# Patient Record
Sex: FEMALE | Race: BLACK OR AFRICAN AMERICAN | Employment: FULL TIME | URBAN - METROPOLITAN AREA
[De-identification: names, ages, dates, MRNs, and addresses within clinical notes are randomized per-mention and may not be internally consistent; named-entity substitution may affect disease eponyms.]

---

## 2017-01-03 ENCOUNTER — TELEPHONE (OUTPATIENT)
Dept: FAMILY MEDICINE CLINIC | Facility: CLINIC | Age: 27
End: 2017-01-03

## 2017-01-03 NOTE — TELEPHONE ENCOUNTER
Received records from immediate care visit when patient presented for elevated BP and syncope last February. Reviewed normal EKG, normal urine dip, normal CBC, and normal BMP results.

## 2017-01-04 ENCOUNTER — MED REC SCAN ONLY (OUTPATIENT)
Dept: FAMILY MEDICINE CLINIC | Facility: CLINIC | Age: 27
End: 2017-01-04

## 2017-01-25 ENCOUNTER — OFFICE VISIT (OUTPATIENT)
Dept: FAMILY MEDICINE CLINIC | Facility: CLINIC | Age: 27
End: 2017-01-25

## 2017-01-25 VITALS
TEMPERATURE: 98 F | HEART RATE: 73 BPM | DIASTOLIC BLOOD PRESSURE: 96 MMHG | SYSTOLIC BLOOD PRESSURE: 130 MMHG | HEIGHT: 67 IN | RESPIRATION RATE: 16 BRPM | BODY MASS INDEX: 22.76 KG/M2 | OXYGEN SATURATION: 97 % | WEIGHT: 145 LBS

## 2017-01-25 DIAGNOSIS — R03.0 ELEVATED BP WITHOUT DIAGNOSIS OF HYPERTENSION: Primary | ICD-10-CM

## 2017-01-25 DIAGNOSIS — Z30.09 ENCOUNTER FOR COUNSELING REGARDING CONTRACEPTION: ICD-10-CM

## 2017-01-25 PROCEDURE — 99213 OFFICE O/P EST LOW 20 MIN: CPT | Performed by: FAMILY MEDICINE

## 2017-01-25 NOTE — PATIENT INSTRUCTIONS
Birth Control Methods  Birth control methods are used to help prevent pregnancy. There are many different methods to choose from.  Talk to your healthcare provider about which method is right for you. Be sure to ask your provider about the effectiveness o A condom is a sheath that forms a thin barrier between the penis and the vagina. It helps prevent pregnancy by keeping sperm from entering the vagina.  When latex condoms are used, they have the added benefit of protecting against most STDs (sexually transm This method requires that you know when in your menstrual cycle you are likely to become pregnant. Then, you avoid sex during those days. This requires careful planning and good discipline. Your healthcare provider can explain more about how this works.   Rashid Viveros

## 2017-01-25 NOTE — PROGRESS NOTES
CC:  Patient presents with: Follow - Up: blood pressure      HPI: 32year old female here to follow-up on elevated blood pressure. Has been keeping a BP log for the past few weeks.   107/69, 130/90, and 149/95 (standing), which was after she had been rush murmurs, S1, S2  Pulmonary:  Clear bilaterally, good air entry  Dermatologic:  No rashes or lesions    Assessment and Plan: 32year old female with hx of elevated BP in the office but normal readings at home here for follow-up.     1. Elevated BP without di

## 2017-03-31 ENCOUNTER — TELEPHONE (OUTPATIENT)
Dept: OBGYN CLINIC | Facility: CLINIC | Age: 27
End: 2017-03-31

## 2017-03-31 NOTE — TELEPHONE ENCOUNTER
Discussed readings with patients and notified her that these were all below goal. Continue hibiscus tea and will have her come into the clinic for a physical in next 1-2 months, but notify me if blood pressure starts to run high again or she has any new co

## 2017-11-01 ENCOUNTER — OFFICE VISIT (OUTPATIENT)
Dept: FAMILY MEDICINE CLINIC | Facility: CLINIC | Age: 27
End: 2017-11-01

## 2017-11-01 DIAGNOSIS — Z11.1 SCREENING-PULMONARY TB: Primary | ICD-10-CM

## 2017-11-01 PROCEDURE — 86580 TB INTRADERMAL TEST: CPT | Performed by: NURSE PRACTITIONER

## 2017-11-02 NOTE — PATIENT INSTRUCTIONS
You will need to return to clinic in 48-72 hours to have results of TB test read. Please return to clinic on 11/4/2017  between 9AM and 4:30PM have your TB test read. If you do not return during this time your test will need to be repeated.      Our · Positive results mean that you may have been infected with the TB bacteria. This doesn’t necessarily mean you have active TB disease. You will need more tests, such as chest Janee Puffer find out if you have TB disease.    Date Last Reviewed: 11/1/2016  © 2

## 2017-11-04 ENCOUNTER — OFFICE VISIT (OUTPATIENT)
Dept: FAMILY MEDICINE CLINIC | Facility: CLINIC | Age: 27
End: 2017-11-04

## 2017-11-04 DIAGNOSIS — Z11.1 SCREENING EXAMINATION FOR PULMONARY TUBERCULOSIS: Primary | ICD-10-CM

## 2018-01-14 ENCOUNTER — APPOINTMENT (OUTPATIENT)
Dept: GENERAL RADIOLOGY | Age: 28
End: 2018-01-14
Attending: EMERGENCY MEDICINE
Payer: COMMERCIAL

## 2018-01-14 ENCOUNTER — HOSPITAL ENCOUNTER (OUTPATIENT)
Age: 28
Discharge: HOME | End: 2018-01-14
Attending: EMERGENCY MEDICINE
Payer: COMMERCIAL

## 2018-01-14 VITALS
TEMPERATURE: 98 F | SYSTOLIC BLOOD PRESSURE: 142 MMHG | DIASTOLIC BLOOD PRESSURE: 103 MMHG | WEIGHT: 145.06 LBS | BODY MASS INDEX: 23 KG/M2 | OXYGEN SATURATION: 98 % | RESPIRATION RATE: 16 BRPM | HEART RATE: 77 BPM

## 2018-01-14 DIAGNOSIS — S16.1XXA NECK STRAIN, INITIAL ENCOUNTER: ICD-10-CM

## 2018-01-14 DIAGNOSIS — S20.211A CONTUSION OF RIGHT CHEST WALL, INITIAL ENCOUNTER: Primary | ICD-10-CM

## 2018-01-14 PROCEDURE — 71101 X-RAY EXAM UNILAT RIBS/CHEST: CPT | Performed by: EMERGENCY MEDICINE

## 2018-01-14 PROCEDURE — 99203 OFFICE O/P NEW LOW 30 MIN: CPT

## 2018-01-14 PROCEDURE — 99214 OFFICE O/P EST MOD 30 MIN: CPT

## 2018-01-14 PROCEDURE — 72040 X-RAY EXAM NECK SPINE 2-3 VW: CPT | Performed by: EMERGENCY MEDICINE

## 2018-01-14 NOTE — ED PROVIDER NOTES
Patient Seen in: [facility] In [address] [place] [address]    History   Patient presents with:  Fall (musculoskeletal, neurologic)    Stated Complaint: back pain after fall    HPI    The patient is a 14-year-old female without significant past medical segment  Breath sounds equal bilaterally  Tender right anterior axillary line at rib 789 area  Abdomen: Nontender  Neuro: Sensation intact light touch in upper extremities  Strength 5/5 upper extremities    ED Course   Labs Reviewed - No data to display

## 2018-01-14 NOTE — ED INITIAL ASSESSMENT (HPI)
Robby Motto off bed on rt side of bed yesterday and now has pain from mid back to neck and had a numb arm is numb. Use advill. Took nothing for pain moves freely no distress.

## 2018-01-29 ENCOUNTER — HOSPITAL ENCOUNTER (OUTPATIENT)
Age: 28
Discharge: HOME | End: 2018-01-29
Attending: EMERGENCY MEDICINE
Payer: COMMERCIAL

## 2018-01-29 VITALS
DIASTOLIC BLOOD PRESSURE: 108 MMHG | HEIGHT: 67 IN | WEIGHT: 142 LBS | HEART RATE: 63 BPM | RESPIRATION RATE: 18 BRPM | BODY MASS INDEX: 22.29 KG/M2 | OXYGEN SATURATION: 100 % | SYSTOLIC BLOOD PRESSURE: 156 MMHG | TEMPERATURE: 98 F

## 2018-01-29 DIAGNOSIS — S01.319A LACERATION OF EAR CANAL, INITIAL ENCOUNTER: Primary | ICD-10-CM

## 2018-01-29 DIAGNOSIS — H72.2X1 TYMPANIC MEMBRANE PERFORATION, MARGINAL, RIGHT: ICD-10-CM

## 2018-01-29 PROCEDURE — 99213 OFFICE O/P EST LOW 20 MIN: CPT

## 2018-01-29 RX ORDER — OFLOXACIN 3 MG/ML
SOLUTION AURICULAR (OTIC) DAILY
Qty: 1 BOTTLE | Refills: 0 | Status: SHIPPED | OUTPATIENT
Start: 2018-01-29 | End: 2018-02-05

## 2018-01-29 NOTE — ED INITIAL ASSESSMENT (HPI)
Pt reports feeling her right ear is clogged, first noted last night. Denies pain. Dried blood noted to ear canal. Denies fever, cough.

## 2018-01-29 NOTE — ED PROVIDER NOTES
Patient Seen in: [address]    History   Patient presents with:  Ear Problem Pain (neurosensory): right    Stated Complaint: EAR INFECTION    HPI    The patient's  was helping the patient clean out her ears with a normal. Pupils are equal, round, and reactive to light. Ears: Left TM and canal normal.  Exam of right ear: Some dried blood noted external canal.  Posterior TM obscured by blood. Anteriorly TM appears intact.   Patient can hear rubbing fingers bilateral

## 2018-01-29 NOTE — ED NOTES
Pt given discharge instructions and prescription, verbalizes understanding. Work note provided for pt and family per request. Denies further questions or needs. Encouraged to call with questions.  Ambulatory out of immediate care with steady gait in no appa

## 2018-02-20 ENCOUNTER — TELEPHONE (OUTPATIENT)
Dept: OBGYN CLINIC | Facility: CLINIC | Age: 28
End: 2018-02-20

## 2018-02-20 ENCOUNTER — TELEPHONE (OUTPATIENT)
Dept: FAMILY MEDICINE CLINIC | Facility: CLINIC | Age: 28
End: 2018-02-20

## 2018-02-20 DIAGNOSIS — L81.9 DISCOLORATION OF SKIN: Primary | ICD-10-CM

## 2018-02-20 DIAGNOSIS — L85.3 DRY SKIN DERMATITIS: ICD-10-CM

## 2018-02-20 NOTE — TELEPHONE ENCOUNTER
Referral submitted as requested and patient will either come in to pick it up or get it at her visit in March.

## 2018-03-20 ENCOUNTER — TELEPHONE (OUTPATIENT)
Dept: FAMILY MEDICINE CLINIC | Facility: CLINIC | Age: 28
End: 2018-03-20

## 2018-03-20 ENCOUNTER — TELEPHONE (OUTPATIENT)
Dept: OBGYN CLINIC | Facility: CLINIC | Age: 28
End: 2018-03-20

## 2018-03-20 DIAGNOSIS — L81.9 DISCOLORATION OF SKIN: ICD-10-CM

## 2018-03-20 DIAGNOSIS — L85.3 DRY SKIN DERMATITIS: Primary | ICD-10-CM

## 2018-04-23 ENCOUNTER — MED REC SCAN ONLY (OUTPATIENT)
Dept: FAMILY MEDICINE CLINIC | Facility: CLINIC | Age: 28
End: 2018-04-23

## 2018-05-01 ENCOUNTER — TELEPHONE (OUTPATIENT)
Dept: FAMILY MEDICINE CLINIC | Facility: CLINIC | Age: 28
End: 2018-05-01

## 2018-05-01 RX ORDER — FLUOCINOLONE ACETONIDE 0.11 MG/ML
OIL TOPICAL
Refills: 3 | COMMUNITY
Start: 2018-03-01 | End: 2018-07-24

## 2018-05-01 RX ORDER — KETOCONAZOLE 20 MG/ML
SHAMPOO TOPICAL
Refills: 4 | COMMUNITY
Start: 2018-03-01 | End: 2018-07-24

## 2018-05-01 RX ORDER — KETOCONAZOLE 20 MG/G
CREAM TOPICAL
Refills: 1 | COMMUNITY
Start: 2018-03-01 | End: 2018-07-24

## 2018-05-01 NOTE — TELEPHONE ENCOUNTER
yesy mendes stating she needs a statement from Dr. Red Patel saying she is free of any communicable diseases.  Fax # [phone] attention HR

## 2018-05-01 NOTE — TELEPHONE ENCOUNTER
Per  she needs to come in for a physical and blood work in order for her to write the letter. Called patient no answer left a message to call back and schedule an appointment.

## 2018-05-05 ENCOUNTER — HOSPITAL ENCOUNTER (OUTPATIENT)
Age: 28
Discharge: HOME | End: 2018-05-05
Attending: EMERGENCY MEDICINE
Payer: COMMERCIAL

## 2018-05-05 VITALS
HEART RATE: 72 BPM | TEMPERATURE: 98 F | RESPIRATION RATE: 20 BRPM | OXYGEN SATURATION: 100 % | SYSTOLIC BLOOD PRESSURE: 149 MMHG | DIASTOLIC BLOOD PRESSURE: 109 MMHG

## 2018-05-05 DIAGNOSIS — R03.0 ELEVATED BLOOD-PRESSURE READING WITHOUT DIAGNOSIS OF HYPERTENSION: ICD-10-CM

## 2018-05-05 DIAGNOSIS — H10.13 ALLERGIC CONJUNCTIVITIS OF BOTH EYES: Primary | ICD-10-CM

## 2018-05-05 DIAGNOSIS — S05.02XA ABRASION OF LEFT CORNEA, INITIAL ENCOUNTER: ICD-10-CM

## 2018-05-05 PROCEDURE — 99214 OFFICE O/P EST MOD 30 MIN: CPT

## 2018-05-05 PROCEDURE — 99213 OFFICE O/P EST LOW 20 MIN: CPT

## 2018-05-05 RX ORDER — CIPROFLOXACIN HYDROCHLORIDE 3.5 MG/ML
1 SOLUTION/ DROPS TOPICAL
Qty: 1 BOTTLE | Refills: 0 | Status: SHIPPED | OUTPATIENT
Start: 2018-05-05 | End: 2018-05-10

## 2018-05-05 RX ORDER — PURIFIED WATER 986 MG/ML
1 SOLUTION OPHTHALMIC ONCE
Status: COMPLETED | OUTPATIENT
Start: 2018-05-05 | End: 2018-05-05

## 2018-05-05 NOTE — ED INITIAL ASSESSMENT (HPI)
Pt here with complaints of left reddened eye and irritation that has been alternating between the right eye for the last 3 weeks, pt states there is discharged coming out of the left eye today, pt states its itchy

## 2018-05-05 NOTE — ED PROVIDER NOTES
Patient Seen in: [address]    History   Patient presents with:  Conjunctivitis    Stated Complaint: both eyes irritated; more so the left eye    HPI    The patient is a 26-year-old female with a history of penicillin al display    ED Course as of May 05 0909  ------------------------------------------------------------      MDM     Findings are consistent with allergic conjunctivitis and a corneal abrasion.   We will treat with Cipro ophthalmic drops and have the patient s

## 2018-05-30 ENCOUNTER — HOSPITAL ENCOUNTER (OUTPATIENT)
Age: 28
Discharge: HOME | End: 2018-05-30
Attending: FAMILY MEDICINE
Payer: COMMERCIAL

## 2018-05-30 VITALS
HEART RATE: 75 BPM | TEMPERATURE: 98 F | SYSTOLIC BLOOD PRESSURE: 142 MMHG | OXYGEN SATURATION: 100 % | DIASTOLIC BLOOD PRESSURE: 100 MMHG | RESPIRATION RATE: 19 BRPM

## 2018-05-30 DIAGNOSIS — H10.32 ACUTE CONJUNCTIVITIS OF LEFT EYE, UNSPECIFIED ACUTE CONJUNCTIVITIS TYPE: ICD-10-CM

## 2018-05-30 DIAGNOSIS — R03.0 ELEVATED BLOOD PRESSURE READING WITHOUT DIAGNOSIS OF HYPERTENSION: Primary | ICD-10-CM

## 2018-05-30 PROCEDURE — 99213 OFFICE O/P EST LOW 20 MIN: CPT

## 2018-05-30 PROCEDURE — 99214 OFFICE O/P EST MOD 30 MIN: CPT

## 2018-05-30 RX ORDER — OLOPATADINE HYDROCHLORIDE 2 MG/ML
1 SOLUTION/ DROPS OPHTHALMIC DAILY
Qty: 1 BOTTLE | Refills: 0 | Status: SHIPPED | OUTPATIENT
Start: 2018-05-30 | End: 2018-06-29

## 2018-05-30 NOTE — ED PROVIDER NOTES
Patient Seen in: [facility]    History   Patient presents with: Eye Visual Problem (opthalmic)    Stated Complaint: left eye irritation    HPI    Pt complains of left eye redness and itching for 1 month .   Reports symptom Systems    Positive for stated complaint: left eye irritation  Other systems are as noted in HPI. Constitutional and vital signs reviewed. All other systems reviewed and negative except as noted above.     PSFH elements reviewed from today and agreed day        Medications Prescribed:  Current Discharge Medication List    START taking these medications    Olopatadine HCl 0.2 % Ophthalmic Solution  Apply 1 drop to eye daily.   Qty: 1 Bottle Refills: 0

## 2018-07-24 ENCOUNTER — OFFICE VISIT (OUTPATIENT)
Dept: FAMILY MEDICINE CLINIC | Facility: CLINIC | Age: 28
End: 2018-07-24

## 2018-07-24 VITALS
DIASTOLIC BLOOD PRESSURE: 90 MMHG | BODY MASS INDEX: 23.07 KG/M2 | WEIGHT: 147 LBS | SYSTOLIC BLOOD PRESSURE: 134 MMHG | HEIGHT: 67 IN | HEART RATE: 70 BPM | OXYGEN SATURATION: 98 %

## 2018-07-24 DIAGNOSIS — Z01.419 ENCOUNTER FOR ANNUAL ROUTINE GYNECOLOGICAL EXAMINATION: Primary | ICD-10-CM

## 2018-07-24 DIAGNOSIS — Z91.018 ALLERGY TO HAZELNUT: ICD-10-CM

## 2018-07-24 DIAGNOSIS — Z12.4 PAP SMEAR FOR CERVICAL CANCER SCREENING: ICD-10-CM

## 2018-07-24 DIAGNOSIS — I10 ELEVATED BLOOD PRESSURE READING IN OFFICE WITH DIAGNOSIS OF HYPERTENSION: ICD-10-CM

## 2018-07-24 PROCEDURE — 88175 CYTOPATH C/V AUTO FLUID REDO: CPT | Performed by: FAMILY MEDICINE

## 2018-07-24 PROCEDURE — 99395 PREV VISIT EST AGE 18-39: CPT | Performed by: FAMILY MEDICINE

## 2018-07-24 NOTE — PROGRESS NOTES
HPI:   Martín Tillman is a 32year old female who presents for a complete physical exam.     Has been cutting back on sodium in her diet and checking her BP at home and notes it has been overall normal.     Last pap: Never had a pap smear before   Menses: History:   Smoking status: Never Smoker                                                              Smokeless tobacco: Never Used                      Alcohol use: No              Occ: Teacher. : Yes.  Children: No.         EXAM:   Wt Readings from secondary causes. Referral to allergist for testing given history of shellfish and hazelnut allergies.      Discussed with patient the following:  -Monthly breast self-exam  -Breast cancer screening/mammograms and clinical breast exams  -Cervical cancer s

## 2018-09-27 ENCOUNTER — TELEPHONE (OUTPATIENT)
Dept: FAMILY MEDICINE CLINIC | Facility: CLINIC | Age: 28
End: 2018-09-27

## 2018-10-17 ENCOUNTER — LAB ENCOUNTER (OUTPATIENT)
Dept: LAB | Facility: REFERENCE LAB | Age: 28
End: 2018-10-17
Attending: FAMILY MEDICINE
Payer: COMMERCIAL

## 2018-10-17 ENCOUNTER — APPOINTMENT (OUTPATIENT)
Dept: LAB | Age: 28
End: 2018-10-17
Attending: FAMILY MEDICINE
Payer: COMMERCIAL

## 2018-10-17 ENCOUNTER — OFFICE VISIT (OUTPATIENT)
Dept: FAMILY MEDICINE CLINIC | Facility: CLINIC | Age: 28
End: 2018-10-17
Payer: COMMERCIAL

## 2018-10-17 VITALS
BODY MASS INDEX: 23.07 KG/M2 | WEIGHT: 147 LBS | HEART RATE: 78 BPM | SYSTOLIC BLOOD PRESSURE: 134 MMHG | DIASTOLIC BLOOD PRESSURE: 98 MMHG | HEIGHT: 67 IN | OXYGEN SATURATION: 98 %

## 2018-10-17 DIAGNOSIS — Z23 NEED FOR VACCINATION: ICD-10-CM

## 2018-10-17 DIAGNOSIS — I10 HYPERTENSION WITH GOAL BLOOD PRESSURE LESS THAN 140/90: ICD-10-CM

## 2018-10-17 DIAGNOSIS — I10 HYPERTENSION WITH GOAL BLOOD PRESSURE LESS THAN 140/90: Primary | ICD-10-CM

## 2018-10-17 PROCEDURE — 90471 IMMUNIZATION ADMIN: CPT | Performed by: FAMILY MEDICINE

## 2018-10-17 PROCEDURE — 36415 COLL VENOUS BLD VENIPUNCTURE: CPT

## 2018-10-17 PROCEDURE — 84443 ASSAY THYROID STIM HORMONE: CPT

## 2018-10-17 PROCEDURE — 80061 LIPID PANEL: CPT

## 2018-10-17 PROCEDURE — 93010 ELECTROCARDIOGRAM REPORT: CPT | Performed by: FAMILY MEDICINE

## 2018-10-17 PROCEDURE — 80048 BASIC METABOLIC PNL TOTAL CA: CPT

## 2018-10-17 PROCEDURE — 81003 URINALYSIS AUTO W/O SCOPE: CPT

## 2018-10-17 PROCEDURE — 85025 COMPLETE CBC W/AUTO DIFF WBC: CPT

## 2018-10-17 PROCEDURE — 99214 OFFICE O/P EST MOD 30 MIN: CPT | Performed by: FAMILY MEDICINE

## 2018-10-17 PROCEDURE — 93005 ELECTROCARDIOGRAM TRACING: CPT

## 2018-10-17 PROCEDURE — 90686 IIV4 VACC NO PRSV 0.5 ML IM: CPT | Performed by: FAMILY MEDICINE

## 2018-10-17 NOTE — PROGRESS NOTES
CC:  Patient presents with:  Blood Pressure: follow up      HPI: 32year old female here to follow-up on elevated blood pressure. Has been checking her BP at home and notes they have been running 130-140/90's.   Reports she has not had headaches, dizziness 10/17/18  1512 10/17/18  1534   BP: (!) 138/94 (!) 134/98   Pulse: 78    SpO2: 98%    Weight: 147 lb    Height: 67\"      Wt Readings from Last 6 Encounters:  10/17/18 : 147 lb  07/24/18 : 147 lb  01/29/18 : 142 lb  01/14/18 : 145 lb 1 oz  01/25/17 : 14

## 2018-10-17 NOTE — PATIENT INSTRUCTIONS
Eating Heart-Healthy Foods  Eating has a big impact on your heart health. In fact, eating healthier can improve several of your heart risks at once. For instance, it helps you manage weight, cholesterol, and blood pressure.  Here are ideas to help you amy Aim to make these foods staples of your diet. If you have diabetes, you may have different recommendations than what is listed here:  · Fruits and vegetables provide plenty of nutrients without a lot of calories.  At meals, fill half your plate with these f · Choose ingredients that spice up your food without adding calories, fat, or sodium. Try these items: horseradish, hot sauce, lemon, mustard, nonfat salad dressings, and vinegar.  For salt-free herbs and spices, try basil, cilantro, cinnamon, pepper, and r

## 2018-10-25 ENCOUNTER — TELEPHONE (OUTPATIENT)
Dept: FAMILY MEDICINE CLINIC | Facility: CLINIC | Age: 28
End: 2018-10-25

## 2018-12-28 ENCOUNTER — HOSPITAL ENCOUNTER (OUTPATIENT)
Age: 28
Discharge: HOME | End: 2018-12-28
Attending: EMERGENCY MEDICINE
Payer: COMMERCIAL

## 2018-12-28 VITALS
HEIGHT: 67 IN | TEMPERATURE: 99 F | BODY MASS INDEX: 23.23 KG/M2 | SYSTOLIC BLOOD PRESSURE: 154 MMHG | RESPIRATION RATE: 18 BRPM | OXYGEN SATURATION: 100 % | WEIGHT: 148 LBS | HEART RATE: 74 BPM | DIASTOLIC BLOOD PRESSURE: 111 MMHG

## 2018-12-28 DIAGNOSIS — L23.1 ALLERGIC CONTACT DERMATITIS DUE TO ADHESIVES: Primary | ICD-10-CM

## 2018-12-28 DIAGNOSIS — T30.0 BURN: ICD-10-CM

## 2018-12-28 PROCEDURE — 99213 OFFICE O/P EST LOW 20 MIN: CPT

## 2018-12-28 PROCEDURE — 99214 OFFICE O/P EST MOD 30 MIN: CPT

## 2018-12-28 RX ORDER — DIAPER,BRIEF,INFANT-TODD,DISP
1 EACH MISCELLANEOUS 2 TIMES DAILY
Qty: 1 TUBE | Refills: 0 | Status: SHIPPED | OUTPATIENT
Start: 2018-12-28 | End: 2019-05-20

## 2018-12-28 NOTE — ED PROVIDER NOTES
Patient Seen in: [address]    History   Patient presents with:  Burn (integumentary)    Stated Complaint: [address]    HPI    Patient complains of burning contact irritant dermatitis.   Patient comes in because elements reviewed from today and agreed except as otherwise stated in HPI.     Physical Exam     ED Triage Vitals   BP 12/28/18 1728 (!) 154/111   Pulse 12/28/18 1728 74   Resp 12/28/18 1728 18   Temp 12/28/18 1728 99.1 °F (37.3 °C)   Temp src 12/28/18 1728 1 Tube Refills: 0    silver sulfADIAZINE 1 % External Cream  Apply 1 Application topically 2 (two) times daily.   Qty: 1 Tube Refills: 0                    Disposition and Plan     Clinical Impression:  Allergic contact dermatitis due to adhesives  (primary
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
## 2019-01-29 ENCOUNTER — TELEPHONE (OUTPATIENT)
Dept: FAMILY MEDICINE CLINIC | Facility: CLINIC | Age: 29
End: 2019-01-29

## 2019-02-15 ENCOUNTER — OFFICE VISIT (OUTPATIENT)
Dept: FAMILY MEDICINE CLINIC | Facility: CLINIC | Age: 29
End: 2019-02-15
Payer: COMMERCIAL

## 2019-02-15 VITALS
HEART RATE: 76 BPM | BODY MASS INDEX: 22.91 KG/M2 | SYSTOLIC BLOOD PRESSURE: 142 MMHG | HEIGHT: 67 IN | OXYGEN SATURATION: 99 % | WEIGHT: 146 LBS | DIASTOLIC BLOOD PRESSURE: 98 MMHG

## 2019-02-15 DIAGNOSIS — I10 HYPERTENSION WITH GOAL BLOOD PRESSURE LESS THAN 140/90: Primary | ICD-10-CM

## 2019-02-15 PROCEDURE — 99214 OFFICE O/P EST MOD 30 MIN: CPT | Performed by: FAMILY MEDICINE

## 2019-02-15 RX ORDER — HYDROCHLOROTHIAZIDE 25 MG/1
25 TABLET ORAL DAILY
Qty: 90 TABLET | Refills: 0 | Status: SHIPPED | OUTPATIENT
Start: 2019-02-15 | End: 2019-05-13

## 2019-02-15 NOTE — PROGRESS NOTES
CC:  Patient presents with: Follow - Up: b/p       HPI: 29year old female with hypertension here to follow-up on blood pressure. Has been checking her blood pressure at home with an automated cuff. Blood pressure always runs between 130-140/90's.    On club or organization: Not on file        Attends meetings of clubs or organizations: Not on file        Relationship status: Not on file      Intimate partner violence:        Fear of current or ex partner: Not on file        Emotionally abused: Not on gorge goal blood pressure less than 140/90    - Blood pressure still high despite lowering her sodium intake, working on stress and eating better, and trial of magnesium supplementation and hibiscus tea  - Will start HCTZ 25 mg daily and advised to increase pota

## 2019-02-15 NOTE — PATIENT INSTRUCTIONS
Eating Heart-Healthy Foods  Eating has a big impact on your heart health. In fact, eating healthier can improve several of your heart risks at once. For instance, it helps you manage weight, cholesterol, and blood pressure.  Here are ideas to help you amy foods staples of your diet. If you have diabetes, you may have different recommendations than what is listed here:  · Fruits and vegetables provide plenty of nutrients without a lot of calories. At meals, fill half your plate with these foods.  Split the ot mustard, nonfat salad dressings, and vinegar. For salt-free herbs and spices, try basil, cilantro, cinnamon, pepper, and rosemary. Date Last Reviewed: 10/1/2017  © 2629-9214 [employer]. [address] [facility], [facility] [place].  All rights skin, including inside the mouth  · unusually weak or tired  Side effects that usually do not require medical attention (report to your doctor or health care professional if they continue or are bothersome):  · change in sex drive or performance  · dry dale attack of severe diarrhea, nausea and vomiting, or if you sweat a lot. The loss of too much body fluid can make it dangerous for you to take this medicine. You may get drowsy or dizzy.  Do not drive, use machinery, or do anything that needs mental alertnes

## 2019-02-28 ENCOUNTER — TELEPHONE (OUTPATIENT)
Dept: FAMILY MEDICINE CLINIC | Facility: CLINIC | Age: 29
End: 2019-02-28

## 2019-02-28 NOTE — TELEPHONE ENCOUNTER
Per pt, friend has shingles. Pt did not have any contact with friend. Pt unsure if she had varicella vaccine. Pt did have chicken pox though. Reviewed info with pt from below.  Pt will check with pediatrician if she had varicella vaccine and if not, will ca

## 2019-04-08 ENCOUNTER — HOSPITAL ENCOUNTER (OUTPATIENT)
Age: 29
Discharge: HOME | End: 2019-04-08
Attending: EMERGENCY MEDICINE
Payer: COMMERCIAL

## 2019-04-08 VITALS
DIASTOLIC BLOOD PRESSURE: 78 MMHG | RESPIRATION RATE: 18 BRPM | OXYGEN SATURATION: 98 % | HEART RATE: 87 BPM | SYSTOLIC BLOOD PRESSURE: 132 MMHG | TEMPERATURE: 98 F

## 2019-04-08 DIAGNOSIS — H10.12 ALLERGIC CONJUNCTIVITIS OF LEFT EYE: Primary | ICD-10-CM

## 2019-04-08 PROCEDURE — 99213 OFFICE O/P EST LOW 20 MIN: CPT

## 2019-04-08 RX ORDER — KETOTIFEN FUMARATE 0.35 MG/ML
1 SOLUTION/ DROPS OPHTHALMIC 2 TIMES DAILY
Qty: 1 BOTTLE | Refills: 0 | Status: SHIPPED | OUTPATIENT
Start: 2019-04-08 | End: 2019-04-09 | Stop reason: ALTCHOICE

## 2019-04-08 RX ORDER — PURIFIED WATER 986 MG/ML
2 SOLUTION OPHTHALMIC ONCE
Status: COMPLETED | OUTPATIENT
Start: 2019-04-08 | End: 2019-04-08

## 2019-04-08 RX ORDER — TETRACAINE HYDROCHLORIDE 5 MG/ML
1 SOLUTION OPHTHALMIC ONCE
Status: COMPLETED | OUTPATIENT
Start: 2019-04-08 | End: 2019-04-08

## 2019-04-08 NOTE — ED PROVIDER NOTES
Patient Seen in: [address]    History   Patient presents with:  Conjunctivitis    Stated Complaint: RED EYE     HPI    Patient is a 25-year-old female with past medical history of environmental allergies woke up with re with PMD in 1 week for repeat evaluation.             Disposition and Plan     Clinical Impression:  Allergic conjunctivitis of left eye  (primary encounter diagnosis)    Disposition:  Discharge  4/8/2019 12:45 pm    Follow-up:  Fadumo Whiteside, [address]

## 2019-04-08 NOTE — ED INITIAL ASSESSMENT (HPI)
Pt to IC with redness to left eye starting this morning. Pt states eye with small amount of white/yellow drainage at the corner of the right eye. Denies changes in vision.  Pt states she wears contact lenses but states she has not worn her contact lenses in

## 2019-04-09 RX ORDER — CIPROFLOXACIN HYDROCHLORIDE 3.5 MG/ML
1 SOLUTION/ DROPS TOPICAL 4 TIMES DAILY
Qty: 5 ML | Refills: 0 | Status: SHIPPED | OUTPATIENT
Start: 2019-04-09 | End: 2019-04-14

## 2019-04-09 NOTE — PROGRESS NOTES
Patient called noting no improvement with ketotifen eyedrops for conjunctivitis. Wants eyedrops to be changed to ciprofloxacin that has worked for her in the past.  Ciprofloxacin will be called into the patient's pharmacy.

## 2019-04-09 NOTE — ED NOTES
Pt called in with questions about the eye drops she was prescribed. Pt transferred to [address] [place] [address] for additional assistance.

## 2019-04-18 ENCOUNTER — TELEPHONE (OUTPATIENT)
Dept: FAMILY MEDICINE CLINIC | Facility: CLINIC | Age: 29
End: 2019-04-18

## 2019-04-18 DIAGNOSIS — H10.12 ALLERGIC CONJUNCTIVITIS OF LEFT EYE: Primary | ICD-10-CM

## 2019-04-18 NOTE — TELEPHONE ENCOUNTER
Pt calling requesting a referral for a new opthalmologist. Pt states Dr. Tatiana Leal office was unprofessional and rude and very hard to get an appointment.
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
## 2019-04-30 ENCOUNTER — TELEPHONE (OUTPATIENT)
Dept: OPHTHALMOLOGY | Facility: CLINIC | Age: 29
End: 2019-04-30

## 2019-05-01 ENCOUNTER — OFFICE VISIT (OUTPATIENT)
Dept: OPHTHALMOLOGY | Facility: CLINIC | Age: 29
End: 2019-05-01
Payer: COMMERCIAL

## 2019-05-01 DIAGNOSIS — H18.823 CONTACT LENS OVERWEAR OF BOTH EYES: Primary | ICD-10-CM

## 2019-05-01 PROCEDURE — 99212 OFFICE O/P EST SF 10 MIN: CPT | Performed by: OPHTHALMOLOGY

## 2019-05-01 PROCEDURE — 99243 OFF/OP CNSLTJ NEW/EST LOW 30: CPT | Performed by: OPHTHALMOLOGY

## 2019-05-01 NOTE — PATIENT INSTRUCTIONS
Contact lens overwear of both eyes  Recommend decrease wearing time and refer to Dr. Chester Bosch for contact lens refit in the near future. Patient should stop using Ketotifen and Ciprofloxacin drops. Diagnosis discussed with patient.   Use artificial tears

## 2019-05-01 NOTE — PROGRESS NOTES
Derick Adjutant is a 29year old female. HPI:     HPI     Consult      Additional comments: Per Jaspreet Sides               Comments     Pt called yesterday complaining of itching and irritation in her left eye since April 8th.  Pt was seen at [address] [place] [address] Application topically 2 (two) times daily.  Disp: 1 Tube Rfl: 0       Allergies:    Hazelnuts               SWELLING  Penicillins               Seasonal                  Shellfish-Derived P*    ANAPHYLAXIS    ROS:     ROS     Positive for: Eyes    Negative 5/16/2019) for refit contacts with Dr. Ivanna Clement .     5/1/2019  Scribed by: Jonna Quintanilla MD

## 2019-05-01 NOTE — ASSESSMENT & PLAN NOTE
Recommend decrease wearing time and refer to Dr. Pérez Delong for contact lens refit in the near future. Patient should stop using Ketotifen and Ciprofloxacin drops. Diagnosis discussed with patient.   Use artificial tears or rewetting drops (any over the co

## 2019-05-14 RX ORDER — HYDROCHLOROTHIAZIDE 25 MG/1
TABLET ORAL
Qty: 90 TABLET | Refills: 0 | Status: SHIPPED | OUTPATIENT
Start: 2019-05-14 | End: 2019-07-31

## 2019-05-15 ENCOUNTER — HOSPITAL ENCOUNTER (OUTPATIENT)
Age: 29
Discharge: HOME | End: 2019-05-15
Attending: FAMILY MEDICINE
Payer: COMMERCIAL

## 2019-05-15 VITALS
WEIGHT: 144 LBS | RESPIRATION RATE: 18 BRPM | DIASTOLIC BLOOD PRESSURE: 90 MMHG | HEART RATE: 80 BPM | OXYGEN SATURATION: 100 % | BODY MASS INDEX: 22.6 KG/M2 | HEIGHT: 67 IN | SYSTOLIC BLOOD PRESSURE: 152 MMHG | TEMPERATURE: 98 F

## 2019-05-15 DIAGNOSIS — K52.9 GASTROENTERITIS: Primary | ICD-10-CM

## 2019-05-15 PROCEDURE — 99214 OFFICE O/P EST MOD 30 MIN: CPT

## 2019-05-15 PROCEDURE — 99213 OFFICE O/P EST LOW 20 MIN: CPT

## 2019-05-15 RX ORDER — ONDANSETRON 4 MG/1
4 TABLET, ORALLY DISINTEGRATING ORAL EVERY 8 HOURS PRN
Qty: 10 TABLET | Refills: 0 | Status: SHIPPED | OUTPATIENT
Start: 2019-05-15 | End: 2019-05-20

## 2019-05-15 NOTE — ED PROVIDER NOTES
Patient Seen in: [address]    History   Patient presents with:  Nausea/Vomiting/Diarrhea (gastrointestinal)    Stated Complaint: NAUSEA DIARRHEA    HPI    51-year-old female presents with 1 day of nausea and loose stool rebound, no guarding and no CVA tenderness. Neurological: She is alert. Skin: Skin is warm. Capillary refill takes less than 2 seconds. Psychiatric: She has a normal mood and affect. Her behavior is normal.   Nursing note and vitals reviewed.

## 2019-05-15 NOTE — ED INITIAL ASSESSMENT (HPI)
Per pt having nausea since last night and one episode of diarrhea this morning. Pt denies any abdominal pain at this time.

## 2019-05-17 ENCOUNTER — HOSPITAL ENCOUNTER (OUTPATIENT)
Age: 29
Discharge: HOME | End: 2019-05-17
Attending: FAMILY MEDICINE
Payer: COMMERCIAL

## 2019-05-17 VITALS
RESPIRATION RATE: 18 BRPM | TEMPERATURE: 99 F | HEIGHT: 67 IN | OXYGEN SATURATION: 100 % | BODY MASS INDEX: 22.6 KG/M2 | DIASTOLIC BLOOD PRESSURE: 104 MMHG | SYSTOLIC BLOOD PRESSURE: 155 MMHG | WEIGHT: 144 LBS | HEART RATE: 70 BPM

## 2019-05-17 DIAGNOSIS — I10 HYPERTENSION, UNSPECIFIED TYPE: ICD-10-CM

## 2019-05-17 DIAGNOSIS — H10.32 ACUTE CONJUNCTIVITIS OF LEFT EYE, UNSPECIFIED ACUTE CONJUNCTIVITIS TYPE: Primary | ICD-10-CM

## 2019-05-17 PROCEDURE — 99214 OFFICE O/P EST MOD 30 MIN: CPT

## 2019-05-17 PROCEDURE — 99213 OFFICE O/P EST LOW 20 MIN: CPT

## 2019-05-17 RX ORDER — ADHESIVE BANDAGE 3/4"
BANDAGE TOPICAL
Qty: 1 EACH | Refills: 0 | Status: SHIPPED | OUTPATIENT
Start: 2019-05-17 | End: 2020-10-26

## 2019-05-17 RX ORDER — TOBRAMYCIN AND DEXAMETHASONE 3; 1 MG/ML; MG/ML
2 SUSPENSION/ DROPS OPHTHALMIC
Qty: 5 ML | Refills: 0 | Status: SHIPPED | OUTPATIENT
Start: 2019-05-17 | End: 2019-05-20

## 2019-05-17 NOTE — ED PROVIDER NOTES
Patient Seen in: [facility]    History   Patient presents with:   Eye Visual Problem (opthalmic)    Stated Complaint: Left Eye Irritation     HPI  31yo F with a PMHx sig for HTN presents to IC with left eye redness and so Pulse 70   Temp 98.7 °F (37.1 °C) (Oral)   Resp 18   Ht 170.2 cm (5' 7\")   Wt 65.3 kg   LMP 05/06/2019   SpO2 100%   BMI 22.55 kg/m²     Right Eye Chart Acuity: 20/20, Corrected  Left Eye Chart Acuity: 20/20, Corrected    Physical Exam   Constitutional: unspecified acute conjunctivitis type  (primary encounter diagnosis)  Hypertension, unspecified type    Disposition:  Discharge  5/17/2019  4:17 pm    Follow-up:  Chilton Castleman, [address] [facility]  [id number] [phone]    Schedule an

## 2019-05-17 NOTE — ED INITIAL ASSESSMENT (HPI)
Pt states today int he afternoon began having redness in left eye and states having some discharge and itchiness. Pt denies injury. Pt denies any other symptoms.

## 2019-05-20 ENCOUNTER — OFFICE VISIT (OUTPATIENT)
Dept: OPTOMETRY | Facility: CLINIC | Age: 29
End: 2019-05-20
Payer: COMMERCIAL

## 2019-05-20 DIAGNOSIS — H52.13 MYOPIA OF BOTH EYES: Primary | ICD-10-CM

## 2019-05-20 PROCEDURE — 92015 DETERMINE REFRACTIVE STATE: CPT | Performed by: OPTOMETRIST

## 2019-05-20 PROCEDURE — 92012 INTRM OPH EXAM EST PATIENT: CPT | Performed by: OPTOMETRIST

## 2019-05-20 RX ORDER — FLUOCINOLONE ACETONIDE 0.11 MG/ML
OIL TOPICAL
Refills: 2 | COMMUNITY
Start: 2019-05-02 | End: 2020-02-03

## 2019-05-20 RX ORDER — KETOCONAZOLE 20 MG/G
CREAM TOPICAL
Refills: 2 | COMMUNITY
Start: 2019-05-02 | End: 2020-02-03

## 2019-05-20 RX ORDER — BETAMETHASONE DIPROPIONATE 0.5 MG/G
LOTION TOPICAL
Refills: 2 | COMMUNITY
Start: 2019-05-02 | End: 2020-10-26

## 2019-05-20 NOTE — PROGRESS NOTES
Clark Juarez is a 29year old female. HPI:     HPI     Patient saw NICOLE  on 5-1-19 for contact lens overwear syndrome  She originally had daily disposables a few years ago but [facility]  refit her into 2 week lenses and then monthly replacement. tablet Rfl: 0       Allergies:    Hazelnuts               SWELLING  Penicillins               Seasonal                  Shellfish-Derived P*    ANAPHYLAXIS    ROS:     ROS     Negative for: Constitutional, Gastrointestinal, Neurological, Skin, Genitourinar comfortavle. Keratometry       K1 K2    Right 40.00 41.00    Left 40.25 41.00          Final Contact Lens Rx       Brand Base Curve Diameter Sphere    Right Brenden Dailies Aqua Comfort Plus 8.70 14.0 -2.00    Left Brenden Dailies Aqua Comfort Plus 8.

## 2019-05-20 NOTE — ASSESSMENT & PLAN NOTE
I gave patient samples of D[employer] Aqua Comfort Plus lenses to try. She will call me and if she likes she will have the RX filled. Gave glasses copy too.

## 2019-05-20 NOTE — PATIENT INSTRUCTIONS
Myopia of both eyes  I gave patient samples of [employer] Aqua Comfort Plus lenses to try. She will call me and if she likes she will have the RX filled. Gave glasses copy too.

## 2019-05-23 ENCOUNTER — TELEPHONE (OUTPATIENT)
Dept: OPHTHALMOLOGY | Facility: CLINIC | Age: 29
End: 2019-05-23

## 2019-05-23 NOTE — TELEPHONE ENCOUNTER
Lauren Bosch to know that the contacts are working fine, so she will take Rx for contacts to order through [facility].

## 2019-06-05 ENCOUNTER — TELEPHONE (OUTPATIENT)
Dept: FAMILY MEDICINE CLINIC | Facility: CLINIC | Age: 29
End: 2019-06-05

## 2019-06-05 NOTE — TELEPHONE ENCOUNTER
Called patient to inform missed Appointment of June 4th at [address] [place] [address] Ne patient of $42 Fee patient upset and will like to have charge removed due to states didn't received an call or email

## 2019-07-05 ENCOUNTER — TELEPHONE (OUTPATIENT)
Dept: OBGYN CLINIC | Facility: CLINIC | Age: 29
End: 2019-07-05

## 2019-07-05 NOTE — TELEPHONE ENCOUNTER
Pt is c/o a corn on her pinky toe on her left foot that is bothering her. Pt would like a recommendation for a podiatrist to get it taken care of.   Pt informed that [facility] is out of town and that we can route this through to the covering providers and then the

## 2019-07-08 NOTE — TELEPHONE ENCOUNTER
Podiatrist  Dr. Rossi Stallings  Address: [address] [place] [address], [address] [place] [address]  Phone: [phone]

## 2019-07-31 ENCOUNTER — LAB ENCOUNTER (OUTPATIENT)
Dept: LAB | Facility: REFERENCE LAB | Age: 29
End: 2019-07-31
Attending: FAMILY MEDICINE
Payer: COMMERCIAL

## 2019-07-31 ENCOUNTER — OFFICE VISIT (OUTPATIENT)
Dept: FAMILY MEDICINE CLINIC | Facility: CLINIC | Age: 29
End: 2019-07-31
Payer: COMMERCIAL

## 2019-07-31 VITALS
WEIGHT: 144 LBS | SYSTOLIC BLOOD PRESSURE: 114 MMHG | DIASTOLIC BLOOD PRESSURE: 80 MMHG | OXYGEN SATURATION: 98 % | HEIGHT: 66.5 IN | HEART RATE: 72 BPM | BODY MASS INDEX: 22.87 KG/M2

## 2019-07-31 DIAGNOSIS — I10 HYPERTENSION WITH GOAL BLOOD PRESSURE LESS THAN 140/90: Primary | ICD-10-CM

## 2019-07-31 DIAGNOSIS — Z00.00 ENCOUNTER FOR ROUTINE ADULT HEALTH EXAMINATION WITHOUT ABNORMAL FINDINGS: ICD-10-CM

## 2019-07-31 LAB
ALBUMIN SERPL-MCNC: 3.4 G/DL (ref 3.4–5)
ALBUMIN/GLOB SERPL: 0.7 {RATIO} (ref 1–2)
ALP LIVER SERPL-CCNC: 50 U/L (ref 37–98)
ALT SERPL-CCNC: 14 U/L (ref 13–56)
ANION GAP SERPL CALC-SCNC: 6 MMOL/L (ref 0–18)
AST SERPL-CCNC: 16 U/L (ref 15–37)
BASOPHILS # BLD AUTO: 0.04 X10(3) UL (ref 0–0.2)
BASOPHILS NFR BLD AUTO: 0.8 %
BILIRUB SERPL-MCNC: 0.3 MG/DL (ref 0.1–2)
BUN BLD-MCNC: 16 MG/DL (ref 7–18)
BUN/CREAT SERPL: 20.5 (ref 10–20)
CALCIUM BLD-MCNC: 8.7 MG/DL (ref 8.5–10.1)
CHLORIDE SERPL-SCNC: 106 MMOL/L (ref 98–112)
CO2 SERPL-SCNC: 27 MMOL/L (ref 21–32)
CREAT BLD-MCNC: 0.78 MG/DL (ref 0.55–1.02)
DEPRECATED RDW RBC AUTO: 38.5 FL (ref 35.1–46.3)
EOSINOPHIL # BLD AUTO: 0.17 X10(3) UL (ref 0–0.7)
EOSINOPHIL NFR BLD AUTO: 3.6 %
ERYTHROCYTE [DISTWIDTH] IN BLOOD BY AUTOMATED COUNT: 12.1 % (ref 11–15)
EST. AVERAGE GLUCOSE BLD GHB EST-MCNC: 105 MG/DL (ref 68–126)
GLOBULIN PLAS-MCNC: 4.6 G/DL (ref 2.8–4.4)
GLUCOSE BLD-MCNC: 87 MG/DL (ref 70–99)
HBA1C MFR BLD HPLC: 5.3 % (ref ?–5.7)
HCT VFR BLD AUTO: 36.6 % (ref 35–48)
HGB BLD-MCNC: 12.1 G/DL (ref 12–16)
IMM GRANULOCYTES # BLD AUTO: 0.01 X10(3) UL (ref 0–1)
IMM GRANULOCYTES NFR BLD: 0.2 %
LYMPHOCYTES # BLD AUTO: 1.62 X10(3) UL (ref 1–4)
LYMPHOCYTES NFR BLD AUTO: 34 %
M PROTEIN MFR SERPL ELPH: 8 G/DL (ref 6.4–8.2)
MCH RBC QN AUTO: 28.9 PG (ref 26–34)
MCHC RBC AUTO-ENTMCNC: 33.1 G/DL (ref 31–37)
MCV RBC AUTO: 87.6 FL (ref 80–100)
MONOCYTES # BLD AUTO: 0.56 X10(3) UL (ref 0.1–1)
MONOCYTES NFR BLD AUTO: 11.7 %
NEUTROPHILS # BLD AUTO: 2.37 X10 (3) UL (ref 1.5–7.7)
NEUTROPHILS # BLD AUTO: 2.37 X10(3) UL (ref 1.5–7.7)
NEUTROPHILS NFR BLD AUTO: 49.7 %
OSMOLALITY SERPL CALC.SUM OF ELEC: 289 MOSM/KG (ref 275–295)
PATIENT FASTING: NO
PLATELET # BLD AUTO: 233 10(3)UL (ref 150–450)
POTASSIUM SERPL-SCNC: 3.6 MMOL/L (ref 3.5–5.1)
RBC # BLD AUTO: 4.18 X10(6)UL (ref 3.8–5.3)
SODIUM SERPL-SCNC: 139 MMOL/L (ref 136–145)
WBC # BLD AUTO: 4.8 X10(3) UL (ref 4–11)

## 2019-07-31 PROCEDURE — 80053 COMPREHEN METABOLIC PANEL: CPT

## 2019-07-31 PROCEDURE — 83036 HEMOGLOBIN GLYCOSYLATED A1C: CPT

## 2019-07-31 PROCEDURE — 85025 COMPLETE CBC W/AUTO DIFF WBC: CPT

## 2019-07-31 PROCEDURE — 36415 COLL VENOUS BLD VENIPUNCTURE: CPT

## 2019-07-31 PROCEDURE — 99213 OFFICE O/P EST LOW 20 MIN: CPT | Performed by: FAMILY MEDICINE

## 2019-07-31 RX ORDER — HYDROCHLOROTHIAZIDE 25 MG/1
TABLET ORAL
Qty: 90 TABLET | Refills: 1 | Status: SHIPPED | OUTPATIENT
Start: 2019-07-31 | End: 2020-02-03

## 2019-07-31 RX ORDER — KETOCONAZOLE 20 MG/ML
SHAMPOO TOPICAL
Refills: 2 | COMMUNITY
Start: 2019-05-02 | End: 2020-02-03

## 2019-07-31 NOTE — PROGRESS NOTES
CC:  Patient presents with:  Hypertension: follow up      HPI: 29year old female here for follow-up of hypertension.   Has been taking HCTZ 25 mg daily since February and takes it in the morning but has been urinating a lot during the day and twice at nig week: Not on file        Minutes per session: Not on file      Stress: Not on file    Relationships      Social connections:        Talks on phone: Not on file        Gets together: Not on file        Attends Taoism service: Not on file        Active me air entry  Dermatologic:  No rashes or lesions  Ext: no edema, 2+ peripheral pulses bilaterally     Assessment and Plan: 29year old female here to follow-up on now very well controlled blood pressure.     1. Hypertension with goal blood pressure less than

## 2019-07-31 NOTE — PATIENT INSTRUCTIONS
Eating Heart-Healthy Foods  Eating has a big impact on your heart health. In fact, eating healthier can improve several of your heart risks at once. For instance, it helps you manage weight, cholesterol, and blood pressure.  Here are ideas to help you amy foods staples of your diet. If you have diabetes, you may have different recommendations than what is listed here:  · Fruits and vegetables provide plenty of nutrients without a lot of calories. At meals, fill half your plate with these foods.  Split the ot mustard, nonfat salad dressings, and vinegar. For salt-free herbs and spices, try basil, cilantro, cinnamon, pepper, and rosemary. Date Last Reviewed: 10/1/2017  © 0047-6507 [employer]. [address] [facility] [place].  All rights

## 2019-09-16 ENCOUNTER — APPOINTMENT (OUTPATIENT)
Dept: GENERAL RADIOLOGY | Age: 29
End: 2019-09-16
Attending: EMERGENCY MEDICINE
Payer: COMMERCIAL

## 2019-09-16 ENCOUNTER — HOSPITAL ENCOUNTER (OUTPATIENT)
Age: 29
Discharge: HOME | End: 2019-09-16
Attending: EMERGENCY MEDICINE
Payer: COMMERCIAL

## 2019-09-16 VITALS
HEART RATE: 67 BPM | DIASTOLIC BLOOD PRESSURE: 96 MMHG | RESPIRATION RATE: 20 BRPM | SYSTOLIC BLOOD PRESSURE: 123 MMHG | OXYGEN SATURATION: 100 % | TEMPERATURE: 98 F

## 2019-09-16 DIAGNOSIS — S90.121A CONTUSION OF LESSER TOE OF RIGHT FOOT WITHOUT DAMAGE TO NAIL, INITIAL ENCOUNTER: Primary | ICD-10-CM

## 2019-09-16 PROCEDURE — 99213 OFFICE O/P EST LOW 20 MIN: CPT

## 2019-09-16 PROCEDURE — 73660 X-RAY EXAM OF TOE(S): CPT | Performed by: EMERGENCY MEDICINE

## 2019-09-16 NOTE — ED NOTES
Pt discharged home, hannah tape and post op shoe in place, pt instructed to follow up with his primary md if symptoms do not improve

## 2019-09-16 NOTE — ED INITIAL ASSESSMENT (HPI)
Pt here with a toe injury, pt states she bumped her right 4th this morning on her TV stand, pt noticed her 4th toe is swollen and states it hurts when she moves her toe

## 2019-09-16 NOTE — ED PROVIDER NOTES
Patient Seen in: [address]    History   Patient presents with:  Lower Extremity Injury (musculoskeletal)    Stated Complaint: RT TOE INJURY    HPI    The patient is a 49-year-old female with a history of hypertension wh PMD in 1 week for repeat evaluation.               Disposition and Plan     Clinical Impression:  Contusion of lesser toe of right foot without damage to nail, initial encounter  (primary encounter diagnosis)    Disposition:  Discharge  9/16/2019  5:41 pm

## 2019-10-15 ENCOUNTER — HOSPITAL ENCOUNTER (OUTPATIENT)
Age: 29
Discharge: HOME | End: 2019-10-15
Attending: EMERGENCY MEDICINE
Payer: COMMERCIAL

## 2019-10-15 VITALS
SYSTOLIC BLOOD PRESSURE: 137 MMHG | RESPIRATION RATE: 18 BRPM | TEMPERATURE: 98 F | HEART RATE: 67 BPM | DIASTOLIC BLOOD PRESSURE: 98 MMHG | OXYGEN SATURATION: 100 %

## 2019-10-15 DIAGNOSIS — I10 ESSENTIAL HYPERTENSION: ICD-10-CM
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
## 2019-10-15 PROCEDURE — 99212 OFFICE O/P EST SF 10 MIN: CPT

## 2019-10-15 PROCEDURE — 99213 OFFICE O/P EST LOW 20 MIN: CPT

## 2019-10-15 RX ORDER — LORATADINE 10 MG/1
10 TABLET ORAL DAILY
Qty: 30 TABLET | Refills: 0 | Status: SHIPPED | OUTPATIENT
Start: 2019-10-15 | End: 2019-11-14

## 2019-10-15 NOTE — ED PROVIDER NOTES
Patient Seen in: [address]      History   Patient presents with:  Rash Skin Problem (integumentary)    Stated Complaint: RASH    HPI    The patient is a 24-year-old female with past history of hypertension on hydrochlor Patient is awake, alert and oriented ×3.   The patient's motor strength is 5 out of 5 and symmetric in the upper and lower extremities bilaterally  Extremities: No focal swelling or tenderness  Skin: Small papular lesion on the dorsal aspect of the left dis

## 2019-10-15 NOTE — ED INITIAL ASSESSMENT (HPI)
Pt here with small circular rashes noted to her bilateral arms and legs, pt is not sure if they are bug bites or an allergic reaction to something, pt noticed them a week ago and has been applying [id number] cream on them , pt denies pain but states

## 2019-11-25 ENCOUNTER — HOSPITAL ENCOUNTER (OUTPATIENT)
Age: 29
Discharge: HOME | End: 2019-11-25
Attending: EMERGENCY MEDICINE
Payer: COMMERCIAL

## 2019-11-25 ENCOUNTER — APPOINTMENT (OUTPATIENT)
Dept: GENERAL RADIOLOGY | Age: 29
End: 2019-11-25
Attending: EMERGENCY MEDICINE
Payer: COMMERCIAL

## 2019-11-25 VITALS
OXYGEN SATURATION: 100 % | RESPIRATION RATE: 18 BRPM | TEMPERATURE: 99 F | DIASTOLIC BLOOD PRESSURE: 103 MMHG | SYSTOLIC BLOOD PRESSURE: 137 MMHG | HEART RATE: 65 BPM | BODY MASS INDEX: 22 KG/M2 | WEIGHT: 138 LBS

## 2019-11-25 DIAGNOSIS — S93.402A MILD SPRAIN OF LEFT ANKLE, INITIAL ENCOUNTER: Primary | ICD-10-CM

## 2019-11-25 PROCEDURE — 73560 X-RAY EXAM OF KNEE 1 OR 2: CPT | Performed by: EMERGENCY MEDICINE

## 2019-11-25 PROCEDURE — 73610 X-RAY EXAM OF ANKLE: CPT | Performed by: EMERGENCY MEDICINE

## 2019-11-25 PROCEDURE — 99213 OFFICE O/P EST LOW 20 MIN: CPT

## 2019-11-25 NOTE — ED PROVIDER NOTES
Patient Seen in: [address]    History   Patient presents with:  Lower Extremity Injury (musculoskeletal)    Stated Complaint: lt ankle injury    HPI    Philly Treadwell is a 29year old female who presents a Alcohol/week: 0.0 standard drinks    Drug use: No      Review of Systems    Positive for stated complaint: lt ankle injury  Other systems are as noted in HPI. Constitutional and vital signs reviewed.       All other systems reviewed and negative except as Einar Phalen, MD on 11/25/2019 at 18:21     Approved by (CST): Martín Tobias MD on 11/25/2019 at 18:21            [facility]     59-year-old female presenting for evaluation of left ankle pain after mechanical fall. Overall well-appearing and neurologically intact.

## 2019-12-06 ENCOUNTER — HOSPITAL ENCOUNTER (OUTPATIENT)
Age: 29
Discharge: HOME | End: 2019-12-06
Attending: EMERGENCY MEDICINE
Payer: COMMERCIAL

## 2019-12-06 VITALS
OXYGEN SATURATION: 100 % | RESPIRATION RATE: 18 BRPM | DIASTOLIC BLOOD PRESSURE: 94 MMHG | SYSTOLIC BLOOD PRESSURE: 147 MMHG | HEART RATE: 64 BPM | TEMPERATURE: 99 F

## 2019-12-06 DIAGNOSIS — H66.003 NON-RECURRENT ACUTE SUPPURATIVE OTITIS MEDIA OF BOTH EARS WITHOUT SPONTANEOUS RUPTURE OF TYMPANIC MEMBRANES: Primary | ICD-10-CM

## 2019-12-06 DIAGNOSIS — H61.23 BILATERAL IMPACTED CERUMEN: ICD-10-CM

## 2019-12-06 PROCEDURE — 99214 OFFICE O/P EST MOD 30 MIN: CPT

## 2019-12-06 PROCEDURE — 99213 OFFICE O/P EST LOW 20 MIN: CPT

## 2019-12-06 PROCEDURE — 69210 REMOVE IMPACTED EAR WAX UNI: CPT

## 2019-12-06 RX ORDER — AZITHROMYCIN 250 MG/1
TABLET, FILM COATED ORAL
Qty: 1 PACKAGE | Refills: 0 | Status: SHIPPED | OUTPATIENT
Start: 2019-12-06 | End: 2019-12-11

## 2019-12-06 NOTE — ED PROVIDER NOTES
Patient Seen in: [address]      History   Patient presents with:  Sore Throat    Stated Complaint: SORE THROAT    HPI    20-year-old female presents for complaint of cough, congestion, sore throat and earaches for the p signs and nursing note reviewed. Constitutional:       General: She is not in acute distress. Appearance: Normal appearance. HENT:      Head: Normocephalic and atraumatic. Jaw: No trismus. Right Ear: There is impacted cerumen.  No mastoid curette and irrigation with warm water and hydrogen peroxide  Patient was re-evaluated after the procedure the the tympanic membranes remained intact. There was complete removal of the cerumen   Patient tolerated procedure without difficulty.         MDM

## 2020-02-03 ENCOUNTER — OFFICE VISIT (OUTPATIENT)
Dept: FAMILY MEDICINE CLINIC | Facility: CLINIC | Age: 30
End: 2020-02-03
Payer: COMMERCIAL

## 2020-02-03 VITALS
BODY MASS INDEX: 23.23 KG/M2 | HEART RATE: 65 BPM | WEIGHT: 148 LBS | HEIGHT: 67 IN | SYSTOLIC BLOOD PRESSURE: 128 MMHG | OXYGEN SATURATION: 98 % | DIASTOLIC BLOOD PRESSURE: 86 MMHG

## 2020-02-03 DIAGNOSIS — I10 HYPERTENSION WITH GOAL BLOOD PRESSURE LESS THAN 140/90: ICD-10-CM

## 2020-02-03 DIAGNOSIS — Z31.69 ENCOUNTER FOR PRECONCEPTION CONSULTATION: ICD-10-CM

## 2020-02-03 DIAGNOSIS — Z00.01 ENCOUNTER FOR ROUTINE ADULT HEALTH EXAMINATION WITH ABNORMAL FINDINGS: Primary | ICD-10-CM

## 2020-02-03 DIAGNOSIS — N91.1 SECONDARY AMENORRHEA: ICD-10-CM

## 2020-02-03 PROCEDURE — 99395 PREV VISIT EST AGE 18-39: CPT | Performed by: FAMILY MEDICINE

## 2020-02-03 RX ORDER — HYDROCHLOROTHIAZIDE 25 MG/1
TABLET ORAL
Qty: 90 TABLET | Refills: 1 | Status: SHIPPED | OUTPATIENT
Start: 2020-02-03 | End: 2020-02-03

## 2020-02-03 RX ORDER — HYDROCHLOROTHIAZIDE 25 MG/1
TABLET ORAL
Qty: 90 TABLET | Refills: 2 | Status: SHIPPED | OUTPATIENT
Start: 2020-02-03 | End: 2020-03-31

## 2020-02-03 NOTE — PROGRESS NOTES
HPI:   Bryant Henning is a 34year old female who presents for a complete physical exam.     Doing well on HCTZ 25 mg daily, though does have to urinate frequently with it. Also gets up at night 1-2 times to urinate.   Home blood pressure read Family History   Problem Relation Age of Onset   • Hypertension Mother    • Stroke Mother    • Hypertension Maternal Grandmother    • Cancer Maternal Grandmother         Lung   • Hypertension Maternal Grandfather    • Stroke Maternal Grandfather    • [place] exam.  Encounter for routine adult health examination with abnormal findings  (primary encounter diagnosis)  Hypertension with goal blood pressure less than 140/90  Secondary amenorrhea  Encounter for preconception consultation  Orders Placed This Encounte will return in 6-8 months for BP follow-up, one year for next [address] or sooner as needed.     Meds & Refills for this Visit:  Requested Prescriptions     Signed Prescriptions Disp Refills   • hydrochlorothiazide 25 MG Oral Tab 90 tablet 2     Sig: TAKE 1 TABLE

## 2020-02-03 NOTE — PATIENT INSTRUCTIONS
Prevention Guidelines, Women Ages 25 to 44  Screening tests and vaccines are an important part of managing your health. A screening test is done to find possible disorders or diseases in people who don't have any symptoms.  The goal is to find a disease e Type 2 diabetes, prediabetes All women diagnosed with gestational diabetes Lifelong testing every 3 years   Type 2 diabetes All women with prediabetes Every year   Gonorrhea Sexually active women at increased risk for infection At routine exams   Hepatitis Measles, mumps, rubella (MMR) All women in this age group who have no record of these infections or vaccines 1 or 2 doses   Meningococcal Women at increased risk for infection should talk with their healthcare provider 1 or more doses   Pneumococcal conjug © 1490-1658 [address] [facility], [facility] [employer]. All rights reserved. This information is not intended as a substitute for professional medical care. Always follow your healthcare professional's instructions.

## 2020-02-28 ENCOUNTER — HOSPITAL ENCOUNTER (OUTPATIENT)
Age: 30
Discharge: HOME | End: 2020-02-28
Attending: EMERGENCY MEDICINE
Payer: COMMERCIAL

## 2020-02-28 VITALS
TEMPERATURE: 98 F | HEART RATE: 56 BPM | OXYGEN SATURATION: 100 % | DIASTOLIC BLOOD PRESSURE: 97 MMHG | RESPIRATION RATE: 21 BRPM | SYSTOLIC BLOOD PRESSURE: 137 MMHG

## 2020-02-28 DIAGNOSIS — H16.001 ULCER OF RIGHT CORNEA: Primary | ICD-10-CM

## 2020-02-28 PROCEDURE — 99213 OFFICE O/P EST LOW 20 MIN: CPT

## 2020-02-28 PROCEDURE — 99214 OFFICE O/P EST MOD 30 MIN: CPT

## 2020-02-28 RX ORDER — MOXIFLOXACIN 5 MG/ML
SOLUTION/ DROPS OPHTHALMIC
Qty: 1 BOTTLE | Refills: 0 | Status: SHIPPED | OUTPATIENT
Start: 2020-02-28 | End: 2020-10-26

## 2020-02-28 NOTE — ED PROVIDER NOTES
Patient Seen in: [facility]      History   Patient presents with:   Eye Visual Problem    Stated Complaint: eye issue    HPI    The patient is a 66-year-old female without contributory past medical history presents with d bilateral  Conjunctiva: injected, no exudate  Slit Lamp:  Anterior chamber within normal limits and without flare  Less than 1 mm corneal ulcer noted in the periphery of the inferior nasal aspect of the cornea  No evidence of foreign body  Fluorescein upta

## 2020-03-11 ENCOUNTER — HOSPITAL ENCOUNTER (OUTPATIENT)
Age: 30
Discharge: HOME | End: 2020-03-11
Attending: EMERGENCY MEDICINE
Payer: COMMERCIAL

## 2020-03-11 VITALS
TEMPERATURE: 99 F | HEIGHT: 67 IN | SYSTOLIC BLOOD PRESSURE: 132 MMHG | DIASTOLIC BLOOD PRESSURE: 97 MMHG | RESPIRATION RATE: 16 BRPM | HEART RATE: 65 BPM | BODY MASS INDEX: 22.76 KG/M2 | OXYGEN SATURATION: 99 % | WEIGHT: 145 LBS

## 2020-03-11 DIAGNOSIS — Z01.00 EYE EXAM NORMAL: Primary | ICD-10-CM

## 2020-03-11 PROCEDURE — 99211 OFF/OP EST MAY X REQ PHY/QHP: CPT

## 2020-03-11 PROCEDURE — 99212 OFFICE O/P EST SF 10 MIN: CPT

## 2020-03-12 NOTE — ED INITIAL ASSESSMENT (HPI)
Patient reports she was taking out her contact and one of them ripped and she is wondering if a piece is still in her right eye. Patient also reports she had a corneal ulcer to the same eye 2 weeks ago.

## 2020-03-30 NOTE — ED PROVIDER NOTES
Patient Seen in: [facility] [address] [place] [address]      History   Patient presents with: Eye Visual Problem    Stated Complaint: fb in eye    HPI    33 yo female was removing her contact lenses and one of them ripped.  She feels like she still focal deficit present. Mental Status: She is alert. Sensory: No sensory deficit. Motor: No abnormal muscle tone.    Psychiatric:         Mood and Affect: Mood normal.         Behavior: Behavior normal.              ED Course   Labs Reviewed -

## 2020-03-31 ENCOUNTER — VIRTUAL PHONE E/M (OUTPATIENT)
Dept: FAMILY MEDICINE CLINIC | Facility: CLINIC | Age: 30
End: 2020-03-31

## 2020-03-31 DIAGNOSIS — H92.03 ACUTE EAR PAIN, BILATERAL: Primary | ICD-10-CM

## 2020-03-31 DIAGNOSIS — I10 HYPERTENSION WITH GOAL BLOOD PRESSURE LESS THAN 140/90: Primary | ICD-10-CM

## 2020-03-31 PROCEDURE — 99212 OFFICE O/P EST SF 10 MIN: CPT | Performed by: FAMILY MEDICINE

## 2020-03-31 RX ORDER — HYDROCHLOROTHIAZIDE 25 MG/1
TABLET ORAL
Qty: 90 TABLET | Refills: 2 | Status: SHIPPED | OUTPATIENT
Start: 2020-03-31 | End: 2020-10-26

## 2020-03-31 NOTE — PROGRESS NOTES
Virtual/Telephone Check-In    Judy Pacheco verbally consents to a Virtual/Telephone Check-In service on 03/31/20.   Patient understands and accepts financial responsibility for any deductible, co-insurance and/or co-pays associated with this

## 2020-10-24 ENCOUNTER — APPOINTMENT (OUTPATIENT)
Dept: GENERAL RADIOLOGY | Age: 30
End: 2020-10-24
Attending: EMERGENCY MEDICINE
Payer: COMMERCIAL

## 2020-10-24 ENCOUNTER — HOSPITAL ENCOUNTER (OUTPATIENT)
Age: 30
Discharge: HOME | End: 2020-10-24
Attending: EMERGENCY MEDICINE
Payer: COMMERCIAL

## 2020-10-24 VITALS
HEART RATE: 84 BPM | SYSTOLIC BLOOD PRESSURE: 131 MMHG | DIASTOLIC BLOOD PRESSURE: 92 MMHG | TEMPERATURE: 99 F | RESPIRATION RATE: 18 BRPM | OXYGEN SATURATION: 97 %

## 2020-10-24 DIAGNOSIS — R68.84 PAIN IN BONE OF JAW: Primary | ICD-10-CM

## 2020-10-24 PROCEDURE — 99213 OFFICE O/P EST LOW 20 MIN: CPT

## 2020-10-24 PROCEDURE — 70110 X-RAY EXAM OF JAW 4/> VIEWS: CPT | Performed by: EMERGENCY MEDICINE

## 2020-10-24 PROCEDURE — 99214 OFFICE O/P EST MOD 30 MIN: CPT

## 2020-10-24 RX ORDER — NAPROXEN 500 MG/1
500 TABLET ORAL 2 TIMES DAILY PRN
Qty: 20 TABLET | Refills: 0 | Status: SHIPPED | OUTPATIENT
Start: 2020-10-24 | End: 2020-10-26

## 2020-10-24 NOTE — ED INITIAL ASSESSMENT (HPI)
Left jaw pain started yesterday after chewing something hard. States painful to open mouth last night. A little more range of motion today but \"very sore\".

## 2020-10-24 NOTE — ED PROVIDER NOTES
Patient Seen in: [facility]      History   Patient presents with:  Jaw Pain    Stated Complaint: jaw pain    HPI    The patient is a 22-year-old female with no significant past medical history presents now with a left jaw pain.   Patient state left angle of the jaw. Chest: Clear to auscultation, no tenderness  Cardiovascular: Regular rate and rhythm without murmur  Abdomen: Soft, nontender and nondistended  Neurologic: Patient is awake, alert and oriented ×3.   The patient's motor strength is

## 2020-10-25 ENCOUNTER — HOSPITAL ENCOUNTER (OUTPATIENT)
Age: 30
Discharge: HOME | End: 2020-10-25
Attending: EMERGENCY MEDICINE
Payer: COMMERCIAL

## 2020-10-25 VITALS
OXYGEN SATURATION: 100 % | TEMPERATURE: 99 F | SYSTOLIC BLOOD PRESSURE: 144 MMHG | RESPIRATION RATE: 18 BRPM | HEART RATE: 70 BPM | DIASTOLIC BLOOD PRESSURE: 95 MMHG

## 2020-10-25 DIAGNOSIS — Z34.90 EARLY STAGE OF PREGNANCY: Primary | ICD-10-CM

## 2020-10-25 PROCEDURE — 99212 OFFICE O/P EST SF 10 MIN: CPT

## 2020-10-25 PROCEDURE — 81025 URINE PREGNANCY TEST: CPT

## 2020-10-25 NOTE — ED PROVIDER NOTES
Patient Seen in: [facility]      History   Patient presents with:  Pregnancy    Stated Complaint: pregnancy test    HPI    The patient is a 51-year-old female with LMP of 9/16/2020 who presents now requesting confirmation of a positive pregna nontender and nondistended  Neurologic: Patient is awake, alert and oriented ×3.   The patient's motor strength is 5 out of 5 and symmetric in the upper and lower extremities bilaterally  Extremities: No focal swelling or tenderness  Skin: No pallor, no red

## 2020-10-25 NOTE — ED INITIAL ASSESSMENT (HPI)
PATIENT ARRIVED AMBULATORY TO ROOM FOR A PREGNANCY TEST.  PATIENT STATES SHE TOOK ONE AT HOME AND THERE WAS A \"FAINT LINE\" PATIENT STATES SHE HAS ENDOMETRIOSIS AND THOUGHT SHE WAS

## 2020-10-26 ENCOUNTER — TELEPHONE (OUTPATIENT)
Dept: FAMILY MEDICINE CLINIC | Facility: CLINIC | Age: 30
End: 2020-10-26

## 2020-10-26 ENCOUNTER — OFFICE VISIT (OUTPATIENT)
Dept: OBGYN CLINIC | Facility: CLINIC | Age: 30
End: 2020-10-26
Payer: COMMERCIAL

## 2020-10-26 ENCOUNTER — TELEPHONE (OUTPATIENT)
Dept: OBGYN CLINIC | Facility: CLINIC | Age: 30
End: 2020-10-26

## 2020-10-26 VITALS
SYSTOLIC BLOOD PRESSURE: 148 MMHG | BODY MASS INDEX: 25 KG/M2 | WEIGHT: 156.63 LBS | HEART RATE: 85 BPM | DIASTOLIC BLOOD PRESSURE: 105 MMHG

## 2020-10-26 DIAGNOSIS — O10.919 CHRONIC HYPERTENSION AFFECTING PREGNANCY: Primary | ICD-10-CM

## 2020-10-26 PROCEDURE — 3077F SYST BP >= 140 MM HG: CPT | Performed by: OBSTETRICS & GYNECOLOGY

## 2020-10-26 PROCEDURE — 99202 OFFICE O/P NEW SF 15 MIN: CPT | Performed by: OBSTETRICS & GYNECOLOGY

## 2020-10-26 PROCEDURE — 3080F DIAST BP >= 90 MM HG: CPT | Performed by: OBSTETRICS & GYNECOLOGY

## 2020-10-26 RX ORDER — LABETALOL 200 MG/1
200 TABLET, FILM COATED ORAL 2 TIMES DAILY
Qty: 60 TABLET | Refills: 5 | Status: SHIPPED | OUTPATIENT
Start: 2020-10-26 | End: 2021-03-13

## 2020-10-26 NOTE — TELEPHONE ENCOUNTER
Pt I seeing OB today and pt to keep appmnt with AS tomorrow f/u for BP. Pt aware ok to continue with HCTZ if on med prior to conception. Pt verbalized understanding and agrees with POC.     LOV with AS was 2/3/20: \"Doing well on HCTZ 25 mg daily, though do

## 2020-10-26 NOTE — TELEPHONE ENCOUNTER
Agree. Benefits of HCTZ likely to exceed risks in pregnancy but will need to ensure she is drinking plenty of fluids and monitor electrolytes. For this reason, can consider switching to Nifedipine.

## 2020-10-26 NOTE — TELEPHONE ENCOUNTER
Pt is insisting on seeing one of our drs and also has an appt scheduled with her PCP. She is very concerned and wants to be sure her blood pressure med is changed ASAP. Booked appt with Ting Martinez today as new pt to discuss bp med during pregnancy.

## 2020-10-26 NOTE — TELEPHONE ENCOUNTER
Pt self-scheduled through My Chart for pregnancy care with SINDHU.  Called pt and notified her appt was booked incorrectly and that we needed to cancel the appt. Pt was actually in our lobby while I was speaking with her. Pt agrees to see all 6 drs.   LMP: 9

## 2020-10-26 NOTE — TELEPHONE ENCOUNTER
Patient states recently found out she was pregnant states needs to know if her blood pressure medication needs to be changed

## 2020-10-27 ENCOUNTER — HOSPITAL ENCOUNTER (OUTPATIENT)
Age: 30
Discharge: ANOTHER HOSPITAL | End: 2020-10-27
Payer: COMMERCIAL

## 2020-10-27 ENCOUNTER — HOSPITAL ENCOUNTER (EMERGENCY)
Facility: HOSPITAL | Age: 30
Discharge: HOME | End: 2020-10-27
Attending: EMERGENCY MEDICINE
Payer: COMMERCIAL

## 2020-10-27 ENCOUNTER — APPOINTMENT (OUTPATIENT)
Dept: ULTRASOUND IMAGING | Facility: HOSPITAL | Age: 30
End: 2020-10-27
Attending: EMERGENCY MEDICINE
Payer: COMMERCIAL

## 2020-10-27 VITALS
SYSTOLIC BLOOD PRESSURE: 129 MMHG | RESPIRATION RATE: 18 BRPM | HEART RATE: 66 BPM | DIASTOLIC BLOOD PRESSURE: 96 MMHG | OXYGEN SATURATION: 100 % | TEMPERATURE: 97 F

## 2020-10-27 VITALS
TEMPERATURE: 98 F | WEIGHT: 147 LBS | DIASTOLIC BLOOD PRESSURE: 98 MMHG | RESPIRATION RATE: 16 BRPM | OXYGEN SATURATION: 99 % | HEART RATE: 73 BPM | HEIGHT: 67 IN | BODY MASS INDEX: 23.07 KG/M2 | SYSTOLIC BLOOD PRESSURE: 128 MMHG

## 2020-10-27 DIAGNOSIS — O20.9 BLEEDING IN EARLY PREGNANCY: ICD-10-CM

## 2020-10-27 DIAGNOSIS — O46.90 VAGINAL BLEEDING IN PREGNANCY: Primary | ICD-10-CM

## 2020-10-27 DIAGNOSIS — N93.9 VAGINAL SPOTTING: Primary | ICD-10-CM

## 2020-10-27 PROCEDURE — 81003 URINALYSIS AUTO W/O SCOPE: CPT | Performed by: EMERGENCY MEDICINE

## 2020-10-27 PROCEDURE — 36415 COLL VENOUS BLD VENIPUNCTURE: CPT

## 2020-10-27 PROCEDURE — 76817 TRANSVAGINAL US OBSTETRIC: CPT | Performed by: EMERGENCY MEDICINE

## 2020-10-27 PROCEDURE — 81025 URINE PREGNANCY TEST: CPT

## 2020-10-27 PROCEDURE — 86900 BLOOD TYPING SEROLOGIC ABO: CPT | Performed by: EMERGENCY MEDICINE

## 2020-10-27 PROCEDURE — 86901 BLOOD TYPING SEROLOGIC RH(D): CPT | Performed by: EMERGENCY MEDICINE

## 2020-10-27 PROCEDURE — 76801 OB US < 14 WKS SINGLE FETUS: CPT | Performed by: EMERGENCY MEDICINE

## 2020-10-27 PROCEDURE — 99215 OFFICE O/P EST HI 40 MIN: CPT | Performed by: NURSE PRACTITIONER

## 2020-10-27 PROCEDURE — 85025 COMPLETE CBC W/AUTO DIFF WBC: CPT | Performed by: EMERGENCY MEDICINE

## 2020-10-27 PROCEDURE — 99284 EMERGENCY DEPT VISIT MOD MDM: CPT

## 2020-10-27 PROCEDURE — 84702 CHORIONIC GONADOTROPIN TEST: CPT | Performed by: EMERGENCY MEDICINE

## 2020-10-27 PROCEDURE — 80048 BASIC METABOLIC PNL TOTAL CA: CPT | Performed by: EMERGENCY MEDICINE

## 2020-10-27 NOTE — ED INITIAL ASSESSMENT (HPI)
Pt came in for spotting this morning, pt is 5 weeks pregnant, G1, P0. Started on labetalol yesterday for elevated BPs at office. Pt denies pain, RR even and nonlabored, speaking in full sentences, ambulatory with steady gait. Sent from [facility] fo work up.

## 2020-10-27 NOTE — ED NOTES
Assumed care of patient from triage. Patient to ED from home for vaginal spotting. Patient states that she is approximately 5 weeks pregnant and that she has been having vaginal spotting. Patient denies odor, denies pain.  Patient states that she has [place] li

## 2020-10-27 NOTE — ED NOTES
Dr. Lakesha Garcia aware of updated vital signs. Per Dr. Lakesha Garcia, okay for patient to drink water for ultrasound. Patient given 1L of water for PO fluids. Will continue to monitor.

## 2020-10-27 NOTE — ED PROVIDER NOTES
Patient Seen in: [facility]      History   Patient presents with:  Pregnancy Issues    Stated Complaint: PREGNANT/SPOTTING    HPI    This is a well-appearing 26-year-old female  who presents with a chief complaint of vaginal spotting.   P Uvula midline. Eyes:      General: Lids are normal.      Extraocular Movements: Extraocular movements intact. Conjunctiva/sclera: Conjunctivae normal.      Pupils: Pupils are equal, round, and reactive to light.    Cardiovascular:      Rate and Rhyth

## 2020-10-27 NOTE — ED PROVIDER NOTES
Patient Seen in: [facility] Emergency Department      History   Patient presents with:  Pregnancy Issues    Stated Complaint: Eval G 5 weeks preg    HPI    24-year-old pregnant female (5-3/7 weeks pregnant by dates) with history of hypertension pr normal  Neurological: Speech normal.  Moving extremities equally x4. Skin: warm and dry, no rashes.   Musculoskeletal: neck is supple non tender        Extremities are symmetrical, full range of motion  Psychiatric: patient is oriented X 3, there is no twila OB/GYN. Patient is advised to return to the emergency department if she develops heavier vaginal bleeding or pelvic pain.                    Disposition and Plan     Clinical Impression:  Vaginal spotting  (primary encounter diagnosis)  Bleeding in early p

## 2020-10-27 NOTE — ED INITIAL ASSESSMENT (HPI)
Pt here with c/o brown spotting that started less than an hour ago. Pt found out about pregnancy 5 days ago. Denies any pain.

## 2020-10-27 NOTE — PROGRESS NOTES
Shanique Wilcox is a 34year old female  Patient's last menstrual period was 2020.  Patient presents with:  Gyn Problem: HPT positive 10/24/2020, concerns about B/p meds and would  like to review what is safe during pregnancy     New

## 2020-10-29 ENCOUNTER — LAB ENCOUNTER (OUTPATIENT)
Dept: LAB | Age: 30
End: 2020-10-29
Attending: FAMILY MEDICINE
Payer: COMMERCIAL

## 2020-10-29 DIAGNOSIS — O20.9 VAGINAL BLEEDING AFFECTING EARLY PREGNANCY: Primary | ICD-10-CM

## 2020-10-29 PROCEDURE — 84702 CHORIONIC GONADOTROPIN TEST: CPT

## 2020-10-29 PROCEDURE — 36415 COLL VENOUS BLD VENIPUNCTURE: CPT

## 2020-10-30 ENCOUNTER — PATIENT MESSAGE (OUTPATIENT)
Dept: OBGYN CLINIC | Facility: CLINIC | Age: 30
End: 2020-10-30

## 2020-10-30 ENCOUNTER — TELEMEDICINE (OUTPATIENT)
Dept: FAMILY MEDICINE CLINIC | Facility: CLINIC | Age: 30
End: 2020-10-30
Payer: COMMERCIAL

## 2020-10-30 ENCOUNTER — OFFICE VISIT (OUTPATIENT)
Dept: OBGYN CLINIC | Facility: CLINIC | Age: 30
End: 2020-10-30
Payer: COMMERCIAL

## 2020-10-30 VITALS
DIASTOLIC BLOOD PRESSURE: 101 MMHG | BODY MASS INDEX: 24 KG/M2 | HEART RATE: 86 BPM | SYSTOLIC BLOOD PRESSURE: 148 MMHG | WEIGHT: 153.19 LBS

## 2020-10-30 VITALS — SYSTOLIC BLOOD PRESSURE: 132 MMHG | HEART RATE: 72 BPM | DIASTOLIC BLOOD PRESSURE: 86 MMHG

## 2020-10-30 DIAGNOSIS — O20.0 THREATENED ABORTION, ANTEPARTUM: Primary | ICD-10-CM

## 2020-10-30 DIAGNOSIS — O10.911 PRE-EXISTING HYPERTENSION AFFECTING PREGNANCY IN FIRST TRIMESTER: Primary | ICD-10-CM

## 2020-10-30 DIAGNOSIS — O10.019 BENIGN ESSENTIAL HYPERTENSION, ANTEPARTUM: ICD-10-CM

## 2020-10-30 PROCEDURE — 3075F SYST BP GE 130 - 139MM HG: CPT | Performed by: FAMILY MEDICINE

## 2020-10-30 PROCEDURE — 3077F SYST BP >= 140 MM HG: CPT | Performed by: OBSTETRICS & GYNECOLOGY

## 2020-10-30 PROCEDURE — 3079F DIAST BP 80-89 MM HG: CPT | Performed by: FAMILY MEDICINE

## 2020-10-30 PROCEDURE — 99212 OFFICE O/P EST SF 10 MIN: CPT | Performed by: OBSTETRICS & GYNECOLOGY

## 2020-10-30 PROCEDURE — 3080F DIAST BP >= 90 MM HG: CPT | Performed by: OBSTETRICS & GYNECOLOGY

## 2020-10-30 PROCEDURE — 99214 OFFICE O/P EST MOD 30 MIN: CPT | Performed by: FAMILY MEDICINE

## 2020-10-30 RX ORDER — GLYCERIN/MINERAL OIL
1 LOTION (ML) TOPICAL DAILY
COMMUNITY

## 2020-10-30 RX ORDER — FLUTICASONE PROPIONATE 50 MCG
2 SPRAY, SUSPENSION (ML) NASAL DAILY
COMMUNITY

## 2020-10-30 NOTE — TELEPHONE ENCOUNTER
Sent to [facility] [phone]. Pt states that she usees the blood pressure cuff on her arm. The brand is Paramed. See previous Mychart.

## 2020-10-30 NOTE — TELEPHONE ENCOUNTER
From: Freddie Fung  To: Tejas Murguia MD  Sent: 10/30/2020 11:37 AM CDT  Subject: Non-Urgent Medical Question    Blood pressure reading     Hello Dr Tereza Nash,    I took this blood pressure as soon as I got home.  I’m not sure why there’s such a disc

## 2020-10-30 NOTE — PATIENT INSTRUCTIONS
Pregnancy: Your First Trimester Changes  The first trimester is a time of rapid development for your baby. Because your baby is growing so quickly, it is important that you start a healthy lifestyle right away.  By the end of the first trimester, your bab

## 2020-10-30 NOTE — TELEPHONE ENCOUNTER
From: Kala Coronado  To: Emanuel Mckeon MD  Sent: 10/30/2020 3:07 PM CDT  Subject: Non-Urgent Medical Question    The battery has been changed and I’ll bring it next visit

## 2020-10-30 NOTE — TELEPHONE ENCOUNTER
From: Edi Aguilar  To: Derik Santana MD  Sent: 10/30/2020 12:29 PM CDT  Subject: Non-Urgent Medical Question    My blood pressure cuff goes on my arm.  The brand is Paramed

## 2020-10-30 NOTE — TELEPHONE ENCOUNTER
Noted.  Her BP monitor needed new battery when she was seen today. Bring BP machine at next visit. Give to MA so that BP can be compared with ours.

## 2020-10-30 NOTE — PROGRESS NOTES
CC:  Patient presents with:  Blood Pressure: readings are up and down       HPI: 34year old newly pregnant female with history of hypertension presenting for video visit to discuss blood pressure control.    Saw her OB today and everything looked good with Alcohol/week: 0.0 standard drinks      Drug use: No      Sexual activity: Never    Lifestyle      Physical activity        Days per week: Not on file        Minutes per session: Not on file      Stress: Not on file    Relationships      Social connections increase if consistently elevated  - Will continue to monitor BP at least twice a day and call immediately if persistently >140/90  - Can continue magnesium supplementation 500 mg nightly but avoid hibiscus tea in pregnancy   - Okay to take Estrellita Solorio

## 2020-10-30 NOTE — PROGRESS NOTES
Kathleen Johnston is a 34year old female  Patient's last menstrual period was 10/24/2020 (exact date). Patient presents with: Follow - Up    Last seen 10/26/20. Was seen at ER on 10/27/20 for brown discharge. No bleeding.    Had bhcg on tenderness. Uterus retroverted. 4-5 wks. Adnexa: normal without masses or tenderness      Assessment & Plan:  1. Threatened , antepartum  Order for ob u/s to be repeated in 2 wks for viability. Will reschedule OBN visit.     2. Benign essential h

## 2020-11-05 ENCOUNTER — TELEPHONE (OUTPATIENT)
Dept: OBGYN CLINIC | Facility: CLINIC | Age: 30
End: 2020-11-05

## 2020-11-05 NOTE — TELEPHONE ENCOUNTER
Patient requesting nurse education appointment for new ob, patient last menses 09/19,please advise at:[phone],thanks.

## 2020-11-05 NOTE — TELEPHONE ENCOUNTER
6w,5d today. Booked first available OBN appt on 11-19. Pt is aware we will check with our supervisor early next week for possible sooner appt and then call her back. Message to Jaelyn.

## 2020-11-13 ENCOUNTER — HOSPITAL ENCOUNTER (OUTPATIENT)
Dept: ULTRASOUND IMAGING | Age: 30
Discharge: HOME | End: 2020-11-13
Attending: OBSTETRICS & GYNECOLOGY
Payer: COMMERCIAL

## 2020-11-13 ENCOUNTER — TELEPHONE (OUTPATIENT)
Dept: OBGYN CLINIC | Facility: CLINIC | Age: 30
End: 2020-11-13

## 2020-11-13 DIAGNOSIS — O20.0 THREATENED ABORTION, ANTEPARTUM: ICD-10-CM

## 2020-11-13 DIAGNOSIS — O20.0 THREATENED ABORTION, ANTEPARTUM: Primary | ICD-10-CM

## 2020-11-13 PROCEDURE — 76801 OB US < 14 WKS SINGLE FETUS: CPT | Performed by: OBSTETRICS & GYNECOLOGY

## 2020-11-13 PROCEDURE — 76817 TRANSVAGINAL US OBSTETRIC: CPT | Performed by: OBSTETRICS & GYNECOLOGY

## 2020-11-13 NOTE — TELEPHONE ENCOUNTER
Unable to reach [place] at number below. U/S updated for transvaginal as pt having U/S for viability.

## 2020-11-16 ENCOUNTER — PATIENT MESSAGE (OUTPATIENT)
Dept: OBGYN CLINIC | Facility: CLINIC | Age: 30
End: 2020-11-16

## 2020-11-16 NOTE — TELEPHONE ENCOUNTER
From: Shanique Wilcox  To: Katie Chance MD  Sent: 11/16/2020 11:35 AM CST  Subject: Non-Urgent Medical Question    Hello,    I was wondering if Head and shoulders shampoos are safe to use during pregnancy?  I ask because most of their shampoos co

## 2020-11-19 ENCOUNTER — NURSE ONLY (OUTPATIENT)
Dept: OBGYN CLINIC | Facility: CLINIC | Age: 30
End: 2020-11-19
Payer: COMMERCIAL

## 2020-11-19 VITALS — HEIGHT: 67 IN | BODY MASS INDEX: 24.01 KG/M2 | WEIGHT: 153 LBS

## 2020-11-19 DIAGNOSIS — Z34.01 ENCOUNTER FOR SUPERVISION OF NORMAL FIRST PREGNANCY IN FIRST TRIMESTER: Primary | ICD-10-CM

## 2020-11-19 PROCEDURE — 3008F BODY MASS INDEX DOCD: CPT | Performed by: OBSTETRICS & GYNECOLOGY

## 2020-11-19 NOTE — PROGRESS NOTES
Pt seen for OBN PC  appt today with no complaints. Normal PN labs ordered, sickle cell, 24 hr urine for protein, CMP. Pt advised all labs must be completed and resulted prior to MD appt. Pt has an OB Ultrasound and hcg in the computer.     Informed p Mediterranean, or  background):  MCV less than 80: No   Neural tube defect (Meningomyelocele, Spina bifida, or Anencephaly): No   Congenital heart defect: No   Down syndrome: No   Jg-Sachs (Ashkenazi [place], [place], [place]): No   Canavan disea Constipation, unspecified constipation type

## 2020-11-20 ENCOUNTER — LAB ENCOUNTER (OUTPATIENT)
Dept: LAB | Age: 30
End: 2020-11-20
Attending: OBSTETRICS & GYNECOLOGY
Payer: COMMERCIAL

## 2020-11-20 DIAGNOSIS — Z34.01 ENCOUNTER FOR SUPERVISION OF NORMAL FIRST PREGNANCY IN FIRST TRIMESTER: ICD-10-CM

## 2020-11-20 PROCEDURE — 86762 RUBELLA ANTIBODY: CPT

## 2020-11-20 PROCEDURE — 87086 URINE CULTURE/COLONY COUNT: CPT

## 2020-11-20 PROCEDURE — 85025 COMPLETE CBC W/AUTO DIFF WBC: CPT

## 2020-11-20 PROCEDURE — 87340 HEPATITIS B SURFACE AG IA: CPT

## 2020-11-20 PROCEDURE — 86850 RBC ANTIBODY SCREEN: CPT

## 2020-11-20 PROCEDURE — 80053 COMPREHEN METABOLIC PANEL: CPT

## 2020-11-20 PROCEDURE — 86780 TREPONEMA PALLIDUM: CPT

## 2020-11-20 PROCEDURE — 36415 COLL VENOUS BLD VENIPUNCTURE: CPT

## 2020-11-20 PROCEDURE — 87389 HIV-1 AG W/HIV-1&-2 AB AG IA: CPT

## 2020-11-20 PROCEDURE — 85660 RBC SICKLE CELL TEST: CPT

## 2020-11-21 ENCOUNTER — PATIENT MESSAGE (OUTPATIENT)
Dept: OBGYN CLINIC | Facility: CLINIC | Age: 30
End: 2020-11-21

## 2020-11-21 NOTE — TELEPHONE ENCOUNTER
From: Sheyla Herrera  To: Geoffrey Gamez MD  Sent: 11/21/2020 7:48 AM CST  Subject: Test Results Question    Hello,    Moreover, regarding the test result, I had just consumed lunch prior to going to get the blood work done.  I see my results are

## 2020-11-21 NOTE — TELEPHONE ENCOUNTER
From: Kirt Pascal  To: Jayne Sarkar MD  Sent: 11/21/2020 7:42 AM CST  Subject: Test Results Question    Hello,    I reviewed my test results, and I was concerned with the basal metabolic panel pertaining to the areas that were below range, a

## 2020-11-23 ENCOUNTER — LAB ENCOUNTER (OUTPATIENT)
Dept: LAB | Age: 30
End: 2020-11-23
Attending: OBSTETRICS & GYNECOLOGY
Payer: COMMERCIAL

## 2020-11-23 DIAGNOSIS — Z34.01 ENCOUNTER FOR SUPERVISION OF NORMAL FIRST PREGNANCY IN FIRST TRIMESTER: ICD-10-CM

## 2020-11-23 PROCEDURE — 84156 ASSAY OF PROTEIN URINE: CPT

## 2020-11-25 ENCOUNTER — PATIENT MESSAGE (OUTPATIENT)
Dept: OBGYN CLINIC | Facility: CLINIC | Age: 30
End: 2020-11-25

## 2020-11-25 ENCOUNTER — INITIAL PRENATAL (OUTPATIENT)
Dept: OBGYN CLINIC | Facility: CLINIC | Age: 30
End: 2020-11-25
Payer: COMMERCIAL

## 2020-11-25 VITALS
SYSTOLIC BLOOD PRESSURE: 130 MMHG | HEART RATE: 88 BPM | WEIGHT: 149.63 LBS | DIASTOLIC BLOOD PRESSURE: 90 MMHG | BODY MASS INDEX: 23 KG/M2

## 2020-11-25 DIAGNOSIS — Z34.01 ENCOUNTER FOR SUPERVISION OF NORMAL FIRST PREGNANCY IN FIRST TRIMESTER: Primary | ICD-10-CM

## 2020-11-25 PROBLEM — I10 HYPERTENSION WITH GOAL BLOOD PRESSURE LESS THAN 140/90: Status: RESOLVED | Noted: 2018-10-17 | Resolved: 2020-11-25

## 2020-11-25 PROBLEM — I10 CHRONIC HYPERTENSION: Status: ACTIVE | Noted: 2020-11-25

## 2020-11-25 PROCEDURE — 81002 URINALYSIS NONAUTO W/O SCOPE: CPT | Performed by: OBSTETRICS & GYNECOLOGY

## 2020-11-25 PROCEDURE — 90686 IIV4 VACC NO PRSV 0.5 ML IM: CPT | Performed by: OBSTETRICS & GYNECOLOGY

## 2020-11-25 PROCEDURE — 90471 IMMUNIZATION ADMIN: CPT | Performed by: OBSTETRICS & GYNECOLOGY

## 2020-11-25 PROCEDURE — 3075F SYST BP GE 130 - 139MM HG: CPT | Performed by: OBSTETRICS & GYNECOLOGY

## 2020-11-25 PROCEDURE — 3080F DIAST BP >= 90 MM HG: CPT | Performed by: OBSTETRICS & GYNECOLOGY

## 2020-11-25 NOTE — TELEPHONE ENCOUNTER
From: Michell Ramirez  To: Rico Hawkins MD  Sent: 11/25/2020 2:40 PM CST  Subject: Non-Urgent Medical Question    Hello,    I was wondering about consuming the following products as I read some controversial information about them:  1)Is it okay

## 2020-11-25 NOTE — PROGRESS NOTES
Pap 7/2018. Gc/chlamydia/trichomonas. Discussed FTS. Reviewed ob u/s. Flu shot. Bedside uj/s--+FHT. Will add iron. Repeat CBC in a month.   RTC 4 wk

## 2020-11-27 ENCOUNTER — PATIENT MESSAGE (OUTPATIENT)
Dept: OBGYN CLINIC | Facility: CLINIC | Age: 30
End: 2020-11-27

## 2020-11-27 NOTE — TELEPHONE ENCOUNTER
From: Beth Davalos  To: Laura Ortiz MD  Sent: 11/27/2020 7:06 AM CST  Subject: Non-Urgent Medical Question    Hello,    I was wondering if there is a specific sitting position I should be in for blood pressure readings?  I usually take my BP

## 2020-11-29 ENCOUNTER — TELEPHONE (OUTPATIENT)
Dept: OBGYN CLINIC | Facility: CLINIC | Age: 30
End: 2020-11-29

## 2020-11-29 NOTE — TELEPHONE ENCOUNTER
9 weeks pregnant c/o brown spotting. She and her  were \"messing around\" this morning and notices some brown discharge after orgasm. Discussed this can be normal.  Pelvic rest advised. ER precautions discussed.   Please call in AM for well being

## 2020-11-30 ENCOUNTER — PATIENT MESSAGE (OUTPATIENT)
Dept: OBGYN CLINIC | Facility: CLINIC | Age: 30
End: 2020-11-30

## 2020-11-30 NOTE — TELEPHONE ENCOUNTER
From: Leanna Vilchis  To: Nancy Arriaga MD  Sent: 11/30/2020 11:28 AM CST  Subject: Non-Urgent Medical Question    Hello,    I have a question regarding my blood pressure. I noticed that when I took it prior to eating lunch, it was 136/96.  I too

## 2020-11-30 NOTE — TELEPHONE ENCOUNTER
Pt states the pinkish/brownish spotting only lasted a few minutes after the sexual encounter. Pt states she is feeling fine this morning.   The spotting is gone and she is not experiencing any cramping,  Pt advised to call if the spotting return or crampin

## 2020-12-01 ENCOUNTER — PATIENT MESSAGE (OUTPATIENT)
Dept: OBGYN CLINIC | Facility: CLINIC | Age: 30
End: 2020-12-01

## 2020-12-01 NOTE — TELEPHONE ENCOUNTER
From: James Turner  To: Eamon Wade MD  Sent: 12/1/2020 7:29 AM CST  Subject: Non-Urgent Medical Question    Hello,    After thorough discussion, my  and I are opting out of the first trimester screening.  We will remain under close mo

## 2020-12-03 ENCOUNTER — TELEPHONE (OUTPATIENT)
Dept: OBGYN CLINIC | Facility: CLINIC | Age: 30
End: 2020-12-03

## 2020-12-03 ENCOUNTER — PATIENT MESSAGE (OUTPATIENT)
Dept: OBGYN CLINIC | Facility: CLINIC | Age: 30
End: 2020-12-03

## 2020-12-03 NOTE — TELEPHONE ENCOUNTER
From: James Turner  To: Eamon Wade MD  Sent: 12/3/2020 2:28 PM CST  Subject: Non-Urgent Medical Question    Hello,    I am emailing because I am Noticing additional wetness or a damp feeling from my vaginal area.  There is no pain or anythin

## 2020-12-03 NOTE — TELEPHONE ENCOUNTER
C/O slight increase in amount of discharge this week. Discharge is clear, feels like she is lubricated. She denies any spotting, no pelvic pain, no odor and no itching.   Advised to call us back if discharge begins to cause any of these symptoms otherwise

## 2020-12-03 NOTE — TELEPHONE ENCOUNTER
Sent [employer] message and wanted to followup. It's her 1st pregnancy and she's concerned about the vaginal discharge and feeling wet vaginally. She wanted to know if this is normal and wanted to make sure everything is ok. Please advise.

## 2020-12-08 ENCOUNTER — PATIENT MESSAGE (OUTPATIENT)
Dept: OBGYN CLINIC | Facility: CLINIC | Age: 30
End: 2020-12-08

## 2020-12-08 NOTE — TELEPHONE ENCOUNTER
From: Lili Hendricks  To: Lissette Leary MD  Sent: 12/8/2020 4:34 PM CST  Subject: Non-Urgent Medical Question    Hello,    Here are some recent blood pressure readings   134/88-11/30  124/86-12/1  117/76-12/2   128/88-12/2  133/86-12/3  128/82-1

## 2020-12-14 ENCOUNTER — PATIENT MESSAGE (OUTPATIENT)
Dept: OBGYN CLINIC | Facility: CLINIC | Age: 30
End: 2020-12-14

## 2020-12-14 NOTE — TELEPHONE ENCOUNTER
From: Loraine Soto  To: Teddy Duffy MD  Sent: 12/14/2020 2:50 PM CST  Subject: Non-Urgent Medical Question    Hello,    I was wondering if the bulk of my weight gain should start in trimester 2?  I've read that I should only gain 1-5 lbs in t

## 2020-12-15 ENCOUNTER — PATIENT MESSAGE (OUTPATIENT)
Dept: OBGYN CLINIC | Facility: CLINIC | Age: 30
End: 2020-12-15

## 2020-12-16 ENCOUNTER — PATIENT MESSAGE (OUTPATIENT)
Dept: OBGYN CLINIC | Facility: CLINIC | Age: 30
End: 2020-12-16

## 2020-12-16 ENCOUNTER — TELEPHONE (OUTPATIENT)
Dept: OBGYN CLINIC | Facility: CLINIC | Age: 30
End: 2020-12-16

## 2020-12-16 ENCOUNTER — ROUTINE PRENATAL (OUTPATIENT)
Dept: OBGYN CLINIC | Facility: CLINIC | Age: 30
End: 2020-12-16
Payer: COMMERCIAL

## 2020-12-16 VITALS
WEIGHT: 151.38 LBS | HEART RATE: 78 BPM | BODY MASS INDEX: 24 KG/M2 | DIASTOLIC BLOOD PRESSURE: 88 MMHG | SYSTOLIC BLOOD PRESSURE: 138 MMHG

## 2020-12-16 DIAGNOSIS — Z36.9 FIRST TRIMESTER SCREENING: Primary | ICD-10-CM

## 2020-12-16 DIAGNOSIS — O20.9 VAGINAL BLEEDING IN PREGNANCY, FIRST TRIMESTER: Primary | ICD-10-CM

## 2020-12-16 DIAGNOSIS — I10 CHRONIC HYPERTENSION: ICD-10-CM

## 2020-12-16 PROCEDURE — 3075F SYST BP GE 130 - 139MM HG: CPT | Performed by: OBSTETRICS & GYNECOLOGY

## 2020-12-16 PROCEDURE — 3079F DIAST BP 80-89 MM HG: CPT | Performed by: OBSTETRICS & GYNECOLOGY

## 2020-12-16 PROCEDURE — 99213 OFFICE O/P EST LOW 20 MIN: CPT | Performed by: OBSTETRICS & GYNECOLOGY

## 2020-12-16 PROCEDURE — 81002 URINALYSIS NONAUTO W/O SCOPE: CPT | Performed by: OBSTETRICS & GYNECOLOGY

## 2020-12-16 RX ORDER — MELATONIN
325
COMMUNITY
End: 2021-03-30 | Stop reason: ALTCHOICE

## 2020-12-16 NOTE — TELEPHONE ENCOUNTER
C/o brown spotting with wiping but denies any cramping. No intercourse or straining with BM in the past 2 days. Offered appt and pt accepted. Booked with DALILA this morning.
LMTCB.
Patient calling back.
Pt is 12 weeks tomorrow had some brow blood but nothing since and no cramping ,
I have reviewed and confirmed nurses' notes...

## 2020-12-16 NOTE — TELEPHONE ENCOUNTER
From: Karen Villanueva  To: Aren Jean DO  Sent: 12/16/2020 11:00 AM CST  Subject: Non-Urgent Medical Question    Hello,    Per my appointment this morning, I was wondering if light cardio exercise is okay, or should I abstain?  I will a

## 2020-12-16 NOTE — PROGRESS NOTES
Problem visit--Pt lifted some heavy boxes from her car this morning and noticed a small amount of brown blood. No cramping or pain. Spec exam wnl. Cx closed/long and no blood in vault. +FHT. Reassurance provided. Pelvic rest rec'd.   RTC next week for

## 2020-12-16 NOTE — TELEPHONE ENCOUNTER
From: Eva Weiss  To: Bernardo Elaine MD  Sent: 12/15/2020 6:06 PM CST  Subject: Non-Urgent Medical Question    Hello,    I was wondering if it okay to use eye drops like Visine or Clear Eyes during pregnancy.  Sometimes, I get mild eye redness

## 2020-12-18 ENCOUNTER — LAB ENCOUNTER (OUTPATIENT)
Dept: LAB | Age: 30
End: 2020-12-18
Attending: FAMILY MEDICINE
Payer: COMMERCIAL

## 2020-12-18 ENCOUNTER — PATIENT MESSAGE (OUTPATIENT)
Dept: OBGYN CLINIC | Facility: CLINIC | Age: 30
End: 2020-12-18

## 2020-12-18 DIAGNOSIS — Z34.01 ENCOUNTER FOR SUPERVISION OF NORMAL FIRST PREGNANCY IN FIRST TRIMESTER: ICD-10-CM

## 2020-12-18 DIAGNOSIS — O99.011 ANEMIA AFFECTING PREGNANCY IN FIRST TRIMESTER: Primary | ICD-10-CM

## 2020-12-18 PROCEDURE — 85027 COMPLETE CBC AUTOMATED: CPT

## 2020-12-18 PROCEDURE — 36415 COLL VENOUS BLD VENIPUNCTURE: CPT

## 2020-12-18 NOTE — TELEPHONE ENCOUNTER
From: Michell Ramirez  To: Rico Hawkins MD  Sent: 12/18/2020 3:20 PM CST  Subject: Non-Urgent Medical Question    Hello,    I just got my test results back regarding my iron levels and it seems like my RBC and HGB amounts have not changed since

## 2020-12-19 ENCOUNTER — PATIENT MESSAGE (OUTPATIENT)
Dept: OBGYN CLINIC | Facility: CLINIC | Age: 30
End: 2020-12-19

## 2020-12-19 NOTE — TELEPHONE ENCOUNTER
Patient was told she was going to recieve a call by 9, but has not received anything regarding her iron levels.

## 2020-12-19 NOTE — TELEPHONE ENCOUNTER
Informed pt that we are waiting on clarification from [phone] and we will contact her once the labs are ordered. Pt will increase iron to tid.

## 2020-12-21 ENCOUNTER — PATIENT MESSAGE (OUTPATIENT)
Dept: OBGYN CLINIC | Facility: CLINIC | Age: 30
End: 2020-12-21

## 2020-12-21 NOTE — TELEPHONE ENCOUNTER
From: Vincent Scott  To: Keren Petit MD  Sent: 12/21/2020 7:47 AM CST  Subject: Non-Urgent Medical Question    Hello,    Regarding my iron levels per the last CBC test, I just realized the prenatal vitamins I have been taking do not contain i

## 2020-12-21 NOTE — TELEPHONE ENCOUNTER
From: Cheyanne Infante  To: Ed Yu MD  Sent: 12/19/2020 12:59 PM CST  Subject: Non-Urgent Medical Question    Hello,    I Set up to have my iron study blood test on Tuesday, 12/22 after seeing Dr Mya Chen.  I am also scheduled to see the MF

## 2020-12-22 ENCOUNTER — LAB ENCOUNTER (OUTPATIENT)
Dept: LAB | Facility: HOSPITAL | Age: 30
End: 2020-12-22
Attending: OBSTETRICS & GYNECOLOGY
Payer: COMMERCIAL

## 2020-12-22 ENCOUNTER — TELEPHONE (OUTPATIENT)
Dept: OBGYN CLINIC | Facility: CLINIC | Age: 30
End: 2020-12-22

## 2020-12-22 ENCOUNTER — ROUTINE PRENATAL (OUTPATIENT)
Dept: OBGYN CLINIC | Facility: CLINIC | Age: 30
End: 2020-12-22
Payer: COMMERCIAL

## 2020-12-22 ENCOUNTER — APPOINTMENT (OUTPATIENT)
Dept: PERINATAL CARE | Facility: HOSPITAL | Age: 30
End: 2020-12-22
Attending: OBSTETRICS & GYNECOLOGY
Payer: COMMERCIAL

## 2020-12-22 ENCOUNTER — PATIENT MESSAGE (OUTPATIENT)
Dept: OBGYN CLINIC | Facility: CLINIC | Age: 30
End: 2020-12-22

## 2020-12-22 VITALS — BODY MASS INDEX: 24 KG/M2 | WEIGHT: 152.19 LBS | DIASTOLIC BLOOD PRESSURE: 84 MMHG | SYSTOLIC BLOOD PRESSURE: 122 MMHG

## 2020-12-22 DIAGNOSIS — O99.011 ANEMIA AFFECTING PREGNANCY IN FIRST TRIMESTER: ICD-10-CM

## 2020-12-22 DIAGNOSIS — Z34.91 ENCOUNTER FOR SUPERVISION OF NORMAL PREGNANCY IN FIRST TRIMESTER, UNSPECIFIED GRAVIDITY: Primary | ICD-10-CM

## 2020-12-22 PROCEDURE — 36415 COLL VENOUS BLD VENIPUNCTURE: CPT

## 2020-12-22 PROCEDURE — 3079F DIAST BP 80-89 MM HG: CPT | Performed by: OBSTETRICS & GYNECOLOGY

## 2020-12-22 PROCEDURE — 83540 ASSAY OF IRON: CPT

## 2020-12-22 PROCEDURE — 82728 ASSAY OF FERRITIN: CPT

## 2020-12-22 PROCEDURE — 81002 URINALYSIS NONAUTO W/O SCOPE: CPT | Performed by: OBSTETRICS & GYNECOLOGY

## 2020-12-22 PROCEDURE — 84466 ASSAY OF TRANSFERRIN: CPT

## 2020-12-22 PROCEDURE — 3074F SYST BP LT 130 MM HG: CPT | Performed by: OBSTETRICS & GYNECOLOGY

## 2020-12-22 NOTE — TELEPHONE ENCOUNTER
Spoke with Barry Reece who stated that pt can resume light exercise and IC. Pt informed and verbalized understanding.

## 2020-12-22 NOTE — TELEPHONE ENCOUNTER
From: Loraine Soto  To: Lb Higuera DO  Sent: 12/22/2020 10:15 AM CST  Subject: Non-Urgent Medical Question    Hello,    Here are my blood pressure readings from the last week or so. I'll continue to send them weekly.  Also, am I able

## 2020-12-22 NOTE — TELEPHONE ENCOUNTER
UDAY.  DALILA's note from appt this morning:    Missy Vera DO   12/22/2020 10:00 AM   Signed      No acute issues. Needs to send BP log today via [employer]. Unsure of genetics still. RTC 4 wks .

## 2020-12-22 NOTE — TELEPHONE ENCOUNTER
Patient saw Eliza Montilla this morning and forgot to ask if she can start exercising again and sexual activity.      Please verify

## 2020-12-24 ENCOUNTER — PATIENT MESSAGE (OUTPATIENT)
Dept: OBGYN CLINIC | Facility: CLINIC | Age: 30
End: 2020-12-24

## 2020-12-26 NOTE — TELEPHONE ENCOUNTER
From: Bryant Henning  To:  Torin Oliva DO  Sent: 12/24/2020 1:23 PM CST  Subject: Non-Urgent Medical Question    Hello,    I purchased this Protein powder specifically for expectant mothers and I was wondering if it is okay to use along

## 2020-12-29 NOTE — TELEPHONE ENCOUNTER
We always recommend taking only what supplements are necessary during pregnancy. If the same ingredients as the prenatal vitamin then she may not need to take the protein powder.

## 2020-12-30 ENCOUNTER — PATIENT MESSAGE (OUTPATIENT)
Dept: OBGYN CLINIC | Facility: CLINIC | Age: 30
End: 2020-12-30

## 2020-12-30 ENCOUNTER — TELEPHONE (OUTPATIENT)
Dept: OBGYN CLINIC | Facility: CLINIC | Age: 30
End: 2020-12-30

## 2020-12-30 ENCOUNTER — HOSPITAL ENCOUNTER (OUTPATIENT)
Dept: PERINATAL CARE | Facility: HOSPITAL | Age: 30
Discharge: HOME | End: 2020-12-30
Attending: OBSTETRICS & GYNECOLOGY
Payer: COMMERCIAL

## 2020-12-30 VITALS
HEART RATE: 89 BPM | DIASTOLIC BLOOD PRESSURE: 93 MMHG | SYSTOLIC BLOOD PRESSURE: 126 MMHG | WEIGHT: 152 LBS | BODY MASS INDEX: 24 KG/M2

## 2020-12-30 DIAGNOSIS — I10 CHRONIC HYPERTENSION: ICD-10-CM

## 2020-12-30 DIAGNOSIS — Z36.9 FIRST TRIMESTER SCREENING: ICD-10-CM

## 2020-12-30 DIAGNOSIS — Z36.9 FIRST TRIMESTER SCREENING: Primary | ICD-10-CM

## 2020-12-30 PROCEDURE — 76813 OB US NUCHAL MEAS 1 GEST: CPT | Performed by: OBSTETRICS & GYNECOLOGY

## 2020-12-30 NOTE — TELEPHONE ENCOUNTER
Called lab and spoke with Eliseo Platt. Eliseo Platt stated that hgb electrophoresis is drawn through a different colored tube and pt would need to come back for re-draw. Message to [facility] Gray [phone].

## 2020-12-30 NOTE — PROGRESS NOTES
BP (!) 126/93   Pulse 89   Wt 152 lb (68.9 kg)   LMP 09/19/2020   BMI 23.81 kg/m²             OB ULTRASOUND REPORT   See imaging tab for complete ultrasound report     Fetal Heart Rate: Present 166 bpm  Fetal Presentation: Transverse Right  Amniotic fluid

## 2020-12-30 NOTE — PROGRESS NOTES
Pt for 1st tri screen  Denies pregnancy complaints  ntd lab card lot # [id number]    Pt very nervous Home Bps 130's 80's  129/94 136/99 126/93  Pt  Taking 200mg labetolol bid

## 2020-12-30 NOTE — TELEPHONE ENCOUNTER
----- Message from Fransisco Negron MD sent at 12/23/2020  4:12 PM CST -----  Normal iron studies. Can lab add hgb electrophoresis to the lab draw?

## 2020-12-30 NOTE — TELEPHONE ENCOUNTER
From: Waldo Thomas  To: Justyna Lomax DO  Sent: 12/30/2020 1:04 PM CST  Subject: Non-Urgent Medical Question    Hello,    I had my first trimester screening today.  When I Went in, my blood pressure was slightly elevated with the top nu

## 2021-01-04 ENCOUNTER — TELEPHONE (OUTPATIENT)
Dept: PERINATAL CARE | Facility: HOSPITAL | Age: 31
End: 2021-01-04

## 2021-01-04 NOTE — TELEPHONE ENCOUNTER
Pt 1st trimester screen results obt  Reviewed By MD Keny Phipps  Low risk for trisomy 18/13/21  Less than 1 in 61946 risk for trisomy 18/13/21    Report sent for scanning into pt record

## 2021-01-06 ENCOUNTER — PATIENT MESSAGE (OUTPATIENT)
Dept: OBGYN CLINIC | Facility: CLINIC | Age: 31
End: 2021-01-06

## 2021-01-06 NOTE — TELEPHONE ENCOUNTER
From: Marielle Bullock  To: Gutierrez Manjarrez DO  Sent: 1/6/2021 9:49 AM CST  Subject: Non-Urgent Medical Question    Hello,    I would like to get some advice regarding the COVID vaccine and pregnancy.  When it becomes more widely available to peop

## 2021-01-07 ENCOUNTER — PATIENT MESSAGE (OUTPATIENT)
Dept: OBGYN CLINIC | Facility: CLINIC | Age: 31
End: 2021-01-07

## 2021-01-08 ENCOUNTER — TELEPHONE (OUTPATIENT)
Dept: OBGYN CLINIC | Facility: CLINIC | Age: 31
End: 2021-01-08

## 2021-01-08 NOTE — TELEPHONE ENCOUNTER
15 weeks. Bloody nose today that seemed more intense than prior nose bleeds. 1st pregnancy, wants to know if this is normal.    Please advise.

## 2021-01-08 NOTE — TELEPHONE ENCOUNTER
15w1d Pt states she had a nosebleed earlier and wanted to make sure this is okay. Advised pt nosebleeds during pregnancy are common due to hormonal changes and increased blood volume.  Advised pt to use humidifier at home and may use a nasal spray, Ken NAM

## 2021-01-19 ENCOUNTER — ROUTINE PRENATAL (OUTPATIENT)
Dept: OBGYN CLINIC | Facility: CLINIC | Age: 31
End: 2021-01-19
Payer: COMMERCIAL

## 2021-01-19 VITALS
WEIGHT: 160 LBS | DIASTOLIC BLOOD PRESSURE: 88 MMHG | SYSTOLIC BLOOD PRESSURE: 130 MMHG | BODY MASS INDEX: 25 KG/M2 | HEART RATE: 78 BPM

## 2021-01-19 DIAGNOSIS — Z34.02 ENCOUNTER FOR SUPERVISION OF NORMAL FIRST PREGNANCY IN SECOND TRIMESTER: Primary | ICD-10-CM

## 2021-01-19 LAB
APPEARANCE: CLEAR
MULTISTIX LOT#: 1044 NUMERIC
PH, URINE: 7.5 (ref 4.5–8)
SPECIFIC GRAVITY: 1 (ref 1–1.03)
URINE-COLOR: YELLOW
UROBILINOGEN,SEMI-QN: 0.2 MG/DL (ref 0–1.9)

## 2021-01-19 PROCEDURE — 3079F DIAST BP 80-89 MM HG: CPT | Performed by: OBSTETRICS & GYNECOLOGY

## 2021-01-19 PROCEDURE — 81002 URINALYSIS NONAUTO W/O SCOPE: CPT | Performed by: OBSTETRICS & GYNECOLOGY

## 2021-01-19 PROCEDURE — 3075F SYST BP GE 130 - 139MM HG: CPT | Performed by: OBSTETRICS & GYNECOLOGY

## 2021-01-19 NOTE — PROGRESS NOTES
BP logs have been normal per pt- sent thru my chart.   Taking asa 81mg/d, covid in pregnancy info sent to my chart

## 2021-01-26 ENCOUNTER — PATIENT MESSAGE (OUTPATIENT)
Dept: FAMILY MEDICINE CLINIC | Facility: CLINIC | Age: 31
End: 2021-01-26

## 2021-01-26 NOTE — TELEPHONE ENCOUNTER
From: Ann Rojas  To: Victor Manuel Guy DO  Sent: 1/26/2021 11:03 AM CST  Subject: Non-Urgent Medical Question    Hello,    I wanted to know when [place] will start alerting people about the Covid vaccines? I'm asking for my mom and myself.  I am s

## 2021-01-28 ENCOUNTER — PATIENT MESSAGE (OUTPATIENT)
Dept: OBGYN CLINIC | Facility: CLINIC | Age: 31
End: 2021-01-28

## 2021-01-28 NOTE — TELEPHONE ENCOUNTER
From: Beatris Schumacher  To: Rock Danilo Yoo MD  Sent: 1/28/2021 5:22 PM CST  Subject: Non-Urgent Medical Question    Hello,    Here are my blood pressure readings for this week.  I'm taking 81 mg of low dose aspirin once daily and 200Mg of Labetalol

## 2021-01-29 ENCOUNTER — TELEPHONE (OUTPATIENT)
Dept: OBGYN CLINIC | Facility: CLINIC | Age: 31
End: 2021-01-29

## 2021-01-29 NOTE — TELEPHONE ENCOUNTER
Paged while on call. Pt with heavy nose bleed. This is the second one today. She states it has now stopped. Recommend humidifier in room. If cannot get nose bleed to stop can go to ER.

## 2021-01-30 NOTE — TELEPHONE ENCOUNTER
Noted, thanks. Had a small nose bleed this morning \"but not bad at all\". She has a humidifier running and using Aquaphor as directed. Pt appreciated the call.

## 2021-02-04 ENCOUNTER — TELEPHONE (OUTPATIENT)
Dept: PERINATAL CARE | Facility: HOSPITAL | Age: 31
End: 2021-02-04

## 2021-02-05 ENCOUNTER — PATIENT MESSAGE (OUTPATIENT)
Dept: OBGYN CLINIC | Facility: CLINIC | Age: 31
End: 2021-02-05

## 2021-02-05 NOTE — TELEPHONE ENCOUNTER
From: Lili Rosenbaum  To: Mariano Cook. MD Naila  Sent: 2/5/2021 9:52 AM CST  Subject: Non-Urgent Medical Question    Hello,    I was just wondering what are some safe options for gas relief?  I feel like I have more gas now along with the growing karoline

## 2021-02-12 ENCOUNTER — HOSPITAL ENCOUNTER (OUTPATIENT)
Dept: PERINATAL CARE | Facility: HOSPITAL | Age: 31
Discharge: HOME | End: 2021-02-12
Attending: OBSTETRICS & GYNECOLOGY
Payer: COMMERCIAL

## 2021-02-12 VITALS
WEIGHT: 163 LBS | HEART RATE: 80 BPM | BODY MASS INDEX: 26 KG/M2 | SYSTOLIC BLOOD PRESSURE: 132 MMHG | DIASTOLIC BLOOD PRESSURE: 92 MMHG

## 2021-02-12 DIAGNOSIS — O16.2 HYPERTENSION AFFECTING PREGNANCY IN SECOND TRIMESTER: ICD-10-CM

## 2021-02-12 DIAGNOSIS — O16.2 HYPERTENSION AFFECTING PREGNANCY IN SECOND TRIMESTER: Primary | ICD-10-CM

## 2021-02-12 PROCEDURE — 76811 OB US DETAILED SNGL FETUS: CPT | Performed by: OBSTETRICS & GYNECOLOGY

## 2021-02-12 PROCEDURE — 99243 OFF/OP CNSLTJ NEW/EST LOW 30: CPT | Performed by: OBSTETRICS & GYNECOLOGY

## 2021-02-12 NOTE — PROGRESS NOTES
Outpatient Maternal-Fetal Medicine Consultation    Dear Dr. Jody Murguia    Thank you for requesting ultrasound evaluation and maternal fetal medicine consultation on your patient Kang SAUCEDA ALVINO.   As you are aware she is a 27year old female  with a s • Hypertension Brother    • Breast Cancer Maternal Aunt    • Glaucoma Neg    • Macular degeneration Neg       Social History    Tobacco Use      Smoking status: Never Smoker      Smokeless tobacco: Never Used    Alcohol use: No      Alcohol/week: 0.0 sta pregnancy, she was on hydrochlorothiazide. Her BPS run gypically 120s-130s/80s on medication. All of her family has CHTN. She has siblings that have HTN.  Mother has history of stroke  Discussion:  We discussed the potential implications associated with c · Weekly NST at 32wks  ·  Monthly growth US in the 3rd trimester  ·   Aspirin 81-120mg daily  ·  Plan delivery at 38-39wks  · Recommend baseline preeclampsia labs-CBC, CMP or AST/ALT/Creatinine, urine protein/creatinine ratio          Thank you for all

## 2021-02-13 ENCOUNTER — TELEPHONE (OUTPATIENT)
Dept: OBGYN CLINIC | Facility: CLINIC | Age: 31
End: 2021-02-13

## 2021-02-13 ENCOUNTER — PATIENT MESSAGE (OUTPATIENT)
Dept: OBGYN CLINIC | Facility: CLINIC | Age: 31
End: 2021-02-13

## 2021-02-13 NOTE — TELEPHONE ENCOUNTER
From: Richard Byers  To: Etelvina Joya DO  Sent: 2/13/2021 7:27 AM CST  Subject: Non-Urgent Medical Question    Hello,    Here are my blood pressure logs for the last week.  I am taking 200 mg of Lebetalol twice daily and the 81mg low dose as

## 2021-02-13 NOTE — TELEPHONE ENCOUNTER
From: Jad Ott  To: Juan Manuel Lovelace MD  Sent: 2/13/2021 1:44 AM CST  Subject: Non-Urgent Medical Question    Hello,    I just had my 20 week anatomy scan and the [facility] doctor recommended that I have certain labs done to monitor and preeclampsia ris

## 2021-02-13 NOTE — TELEPHONE ENCOUNTER
ON CALL-- spoke with pt.   20 wk pregnant. Having intermittent belly button pain. +FM. No bleeding. Had normal ultrasound yesterday. Monitor. Reassurance.

## 2021-02-15 ENCOUNTER — ROUTINE PRENATAL (OUTPATIENT)
Dept: OBGYN CLINIC | Facility: CLINIC | Age: 31
End: 2021-02-15
Payer: COMMERCIAL

## 2021-02-15 VITALS
HEART RATE: 70 BPM | BODY MASS INDEX: 25 KG/M2 | WEIGHT: 161.63 LBS | SYSTOLIC BLOOD PRESSURE: 122 MMHG | DIASTOLIC BLOOD PRESSURE: 78 MMHG

## 2021-02-15 DIAGNOSIS — Z34.93 ENCOUNTER FOR SUPERVISION OF NORMAL PREGNANCY IN THIRD TRIMESTER, UNSPECIFIED GRAVIDITY: Primary | ICD-10-CM

## 2021-02-15 LAB
MULTISTIX LOT#: 5077 NUMERIC
PH, URINE: 7.5 (ref 4.5–8)
SPECIFIC GRAVITY: 1 (ref 1–1.03)
UROBILINOGEN,SEMI-QN: 0 MG/DL (ref 0–1.9)

## 2021-02-15 PROCEDURE — 81002 URINALYSIS NONAUTO W/O SCOPE: CPT | Performed by: OBSTETRICS & GYNECOLOGY

## 2021-02-15 PROCEDURE — 3078F DIAST BP <80 MM HG: CPT | Performed by: OBSTETRICS & GYNECOLOGY

## 2021-02-15 PROCEDURE — 3074F SYST BP LT 130 MM HG: CPT | Performed by: OBSTETRICS & GYNECOLOGY

## 2021-02-16 ENCOUNTER — TELEPHONE (OUTPATIENT)
Dept: OBGYN CLINIC | Facility: CLINIC | Age: 31
End: 2021-02-16

## 2021-02-16 NOTE — TELEPHONE ENCOUNTER
Patient has the opportunity to receive the Covid vaccine and would like to speak to one of the doctors about the risks since she is expecting. Please call.

## 2021-02-16 NOTE — TELEPHONE ENCOUNTER
Pt informed that we are following ACOG and MFM recs for pn pts to get the vaccine. I asked pt if she was given the info sheet we have here. Pt states she has a copy of it but still would like to take to a doctor about the possible negative effects to her baby if she gets the vaccine. Message to SINDHU on call.

## 2021-02-17 ENCOUNTER — PATIENT MESSAGE (OUTPATIENT)
Dept: OBGYN CLINIC | Facility: CLINIC | Age: 31
End: 2021-02-17

## 2021-02-17 NOTE — TELEPHONE ENCOUNTER
From: Brenda Magallanes  To: Marla Kaur DO  Sent: 2/17/2021 9:22 AM CST  Subject: Non-Urgent Medical Question    Hello,    I called yesterday regarding the Covid Vaccine.  I do understand that the [facility] group of [place] recommends it for pregnan

## 2021-02-18 NOTE — TELEPHONE ENCOUNTER
We do not have any data on immediate or long term affects on pregnant patients because there just is not any data.   What we do know is that covid virus can be dangerous in pregnancy- so much so that we give blood thinners to our pregnant patients with covi

## 2021-02-19 ENCOUNTER — TELEPHONE (OUTPATIENT)
Dept: PERINATAL CARE | Facility: HOSPITAL | Age: 31
End: 2021-02-19

## 2021-02-23 ENCOUNTER — PATIENT MESSAGE (OUTPATIENT)
Dept: FAMILY MEDICINE CLINIC | Facility: CLINIC | Age: 31
End: 2021-02-23

## 2021-02-23 NOTE — TELEPHONE ENCOUNTER
From: Beatris Schumacher  To: [place] Teena,   Sent: 2/23/2021 2:35 AM CST  Subject: Other    Hello,    My mother, Jose Russ, suffered a small heart attack and was rushed from the [facility] urgent care in [facility] to [facility].  I wante

## 2021-02-24 ENCOUNTER — ROUTINE PRENATAL (OUTPATIENT)
Dept: OBGYN CLINIC | Facility: CLINIC | Age: 31
End: 2021-02-24
Payer: COMMERCIAL

## 2021-02-24 VITALS
DIASTOLIC BLOOD PRESSURE: 88 MMHG | WEIGHT: 164 LBS | BODY MASS INDEX: 26 KG/M2 | SYSTOLIC BLOOD PRESSURE: 132 MMHG | HEART RATE: 80 BPM

## 2021-02-24 DIAGNOSIS — Z34.92 ENCOUNTER FOR SUPERVISION OF NORMAL PREGNANCY IN SECOND TRIMESTER, UNSPECIFIED GRAVIDITY: Primary | ICD-10-CM

## 2021-02-24 LAB
MULTISTIX LOT#: 5077 NUMERIC
PH, URINE: 7 (ref 4.5–8)
SPECIFIC GRAVITY: 1.01 (ref 1–1.03)
UROBILINOGEN,SEMI-QN: 0 MG/DL (ref 0–1.9)

## 2021-02-24 PROCEDURE — 3075F SYST BP GE 130 - 139MM HG: CPT | Performed by: OBSTETRICS & GYNECOLOGY

## 2021-02-24 PROCEDURE — 81002 URINALYSIS NONAUTO W/O SCOPE: CPT | Performed by: OBSTETRICS & GYNECOLOGY

## 2021-02-24 PROCEDURE — 3079F DIAST BP 80-89 MM HG: CPT | Performed by: OBSTETRICS & GYNECOLOGY

## 2021-02-25 PROBLEM — O10.919 CHRONIC HYPERTENSION AFFECTING PREGNANCY: Status: ACTIVE | Noted: 2020-11-25

## 2021-02-25 PROBLEM — O10.919 CHRONIC HYPERTENSION AFFECTING PREGNANCY (HCC): Status: ACTIVE | Noted: 2020-11-25

## 2021-02-26 NOTE — PROGRESS NOTES
Girl. Problem visit with decreased FM in past week. She understands she has an anterior placenta but is, of course, nervous. FHT's by doppler and we could hear and she could feel FM. 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  She has [employer]

## 2021-03-05 ENCOUNTER — PATIENT MESSAGE (OUTPATIENT)
Dept: OBGYN CLINIC | Facility: CLINIC | Age: 31
End: 2021-03-05

## 2021-03-05 DIAGNOSIS — Z23 NEED FOR VACCINATION: ICD-10-CM

## 2021-03-05 NOTE — TELEPHONE ENCOUNTER
From: Sheree Balderas  To: Byron Ferrell. DO Sanjuana  Sent: 3/5/2021 1:47 PM CST  Subject: Non-Urgent Medical Question    Hello,    Here is my BP log for this week. I'm taking Lebetalol 200mg twice daily and 81mg of low dose aspirin once daily.     113/7

## 2021-03-10 ENCOUNTER — PATIENT MESSAGE (OUTPATIENT)
Dept: OBGYN CLINIC | Facility: CLINIC | Age: 31
End: 2021-03-10

## 2021-03-10 NOTE — TELEPHONE ENCOUNTER
From: Nancy Boyd  To: Veronica Oliver. MD Naila  Sent: 3/10/2021 10:32 AM CST  Subject: Non-Urgent Medical Question    Hello,    I was wondering if hemorrhoid cream would be helpful in getting rid of or treating hemorrhoids?  I have them and they are

## 2021-03-12 ENCOUNTER — ROUTINE PRENATAL (OUTPATIENT)
Dept: OBGYN CLINIC | Facility: CLINIC | Age: 31
End: 2021-03-12
Payer: COMMERCIAL

## 2021-03-12 VITALS
WEIGHT: 168 LBS | BODY MASS INDEX: 26 KG/M2 | DIASTOLIC BLOOD PRESSURE: 75 MMHG | SYSTOLIC BLOOD PRESSURE: 130 MMHG | HEART RATE: 87 BPM

## 2021-03-12 DIAGNOSIS — Z34.92 ENCOUNTER FOR SUPERVISION OF NORMAL PREGNANCY IN SECOND TRIMESTER, UNSPECIFIED GRAVIDITY: Primary | ICD-10-CM

## 2021-03-12 PROBLEM — O99.012 ANEMIA DURING PREGNANCY IN SECOND TRIMESTER (HCC): Status: ACTIVE | Noted: 2021-03-12

## 2021-03-12 PROBLEM — O99.012 ANEMIA DURING PREGNANCY IN SECOND TRIMESTER: Status: ACTIVE | Noted: 2021-03-12

## 2021-03-12 LAB
APPEARANCE: CLEAR
MULTISTIX LOT#: 5077 NUMERIC
PH, URINE: 7.5 (ref 4.5–8)
SPECIFIC GRAVITY: 1.01 (ref 1–1.03)
URINE-COLOR: YELLOW
UROBILINOGEN,SEMI-QN: 0.2 MG/DL (ref 0–1.9)

## 2021-03-12 PROCEDURE — 3078F DIAST BP <80 MM HG: CPT | Performed by: OBSTETRICS & GYNECOLOGY

## 2021-03-12 PROCEDURE — 3075F SYST BP GE 130 - 139MM HG: CPT | Performed by: OBSTETRICS & GYNECOLOGY

## 2021-03-12 PROCEDURE — 81002 URINALYSIS NONAUTO W/O SCOPE: CPT | Performed by: OBSTETRICS & GYNECOLOGY

## 2021-03-13 ENCOUNTER — TELEPHONE (OUTPATIENT)
Dept: OBGYN CLINIC | Facility: CLINIC | Age: 31
End: 2021-03-13

## 2021-03-13 RX ORDER — LABETALOL 200 MG/1
200 TABLET, FILM COATED ORAL 2 TIMES DAILY
Qty: 60 TABLET | Refills: 3 | Status: SHIPPED | OUTPATIENT
Start: 2021-03-13 | End: 2021-04-19

## 2021-03-13 NOTE — TELEPHONE ENCOUNTER
Fax received. Refill request:    •  Labetalol HCl 200 MG Oral Tab, Take 1 tablet (200 mg total) by mouth 2 (two) times daily. , Disp: 60 tablet, Rfl: 5

## 2021-03-15 ENCOUNTER — TELEPHONE (OUTPATIENT)
Dept: OBGYN CLINIC | Facility: CLINIC | Age: 31
End: 2021-03-15

## 2021-03-16 ENCOUNTER — PATIENT MESSAGE (OUTPATIENT)
Dept: OBGYN CLINIC | Facility: CLINIC | Age: 31
End: 2021-03-16

## 2021-03-17 ENCOUNTER — LAB ENCOUNTER (OUTPATIENT)
Dept: LAB | Age: 31
End: 2021-03-17
Attending: OBSTETRICS & GYNECOLOGY
Payer: COMMERCIAL

## 2021-03-17 DIAGNOSIS — Z34.92 ENCOUNTER FOR SUPERVISION OF NORMAL PREGNANCY IN SECOND TRIMESTER, UNSPECIFIED GRAVIDITY: ICD-10-CM

## 2021-03-17 LAB
DEPRECATED RDW RBC AUTO: 43.9 FL (ref 35.1–46.3)
ERYTHROCYTE [DISTWIDTH] IN BLOOD BY AUTOMATED COUNT: 13.1 % (ref 11–15)
GLUCOSE 1H P GLC SERPL-MCNC: 77 MG/DL
HCT VFR BLD AUTO: 29.1 %
HGB BLD-MCNC: 9.9 G/DL
MCH RBC QN AUTO: 31.5 PG (ref 26–34)
MCHC RBC AUTO-ENTMCNC: 34 G/DL (ref 31–37)
MCV RBC AUTO: 92.7 FL
PLATELET # BLD AUTO: 207 10(3)UL (ref 150–450)
RBC # BLD AUTO: 3.14 X10(6)UL
WBC # BLD AUTO: 6.3 X10(3) UL (ref 4–11)

## 2021-03-17 PROCEDURE — 36415 COLL VENOUS BLD VENIPUNCTURE: CPT

## 2021-03-17 PROCEDURE — 85027 COMPLETE CBC AUTOMATED: CPT

## 2021-03-17 PROCEDURE — 82950 GLUCOSE TEST: CPT

## 2021-03-17 NOTE — TELEPHONE ENCOUNTER
From: Duncan Trejo  To: Crystal Domínguez MD  Sent: 3/16/2021 9:19 PM CDT  Subject: Non-Urgent Medical Question    Hello,    Here are my blood pressures for the past week.  I'm still taking 200mg of Labetalol twice daily and an 81mg low dose aspiri

## 2021-03-21 NOTE — PROGRESS NOTES
Stevie Watters    Dear Dr. Pricila Duke    Thank you for requesting an ultrasound and maternal-fetal medicine consultation on your patient Calvin Gibbs ALVINO.   As you are aware she is a 27year old female  with a willams pr were reviewed as well as recommendations for serial growth ultrasounds and antepartum testing.     IMPRESSION:  · IUP at 26w0d  · Hypertension on labetalol    RECOMMENDATIONS:  · Continue care with Dr. Polly Gurrola  Monthly growth ultrasounds beginning at 26 weeks

## 2021-03-22 ENCOUNTER — HOSPITAL ENCOUNTER (OUTPATIENT)
Facility: HOSPITAL | Age: 31
Setting detail: OBSERVATION
Discharge: HOME | End: 2021-03-22
Attending: OBSTETRICS & GYNECOLOGY | Admitting: OBSTETRICS & GYNECOLOGY
Payer: COMMERCIAL

## 2021-03-22 ENCOUNTER — TELEPHONE (OUTPATIENT)
Dept: OBGYN CLINIC | Facility: CLINIC | Age: 31
End: 2021-03-22

## 2021-03-22 VITALS
RESPIRATION RATE: 14 BRPM | TEMPERATURE: 98 F | HEART RATE: 70 BPM | DIASTOLIC BLOOD PRESSURE: 77 MMHG | SYSTOLIC BLOOD PRESSURE: 127 MMHG

## 2021-03-22 PROBLEM — Z34.90 PREGNANCY: Status: ACTIVE | Noted: 2021-03-22

## 2021-03-22 PROBLEM — Z34.90 PREGNANCY (HCC): Status: ACTIVE | Noted: 2021-03-22

## 2021-03-22 PROCEDURE — 59025 FETAL NON-STRESS TEST: CPT | Performed by: OBSTETRICS & GYNECOLOGY

## 2021-03-22 RX ORDER — ASPIRIN 81 MG/1
TABLET, CHEWABLE ORAL DAILY
Status: ON HOLD | COMMUNITY
End: 2021-06-24

## 2021-03-22 RX ORDER — DOCUSATE SODIUM 100 MG/1
100 CAPSULE, LIQUID FILLED ORAL 2 TIMES DAILY
Status: ON HOLD | COMMUNITY
End: 2021-06-24

## 2021-03-22 NOTE — TRIAGE
[facility]      Triage Note    Radha De La Paz Patient Status:  Observation    1990 MRN [id number]   Location [address] Attending Rajesh Chand, [facility] Day # 0 PCP DO Alyse Santana Acoustic Stimulator: No           Nonstress Test Interpretation: Appropriate for gestational age           Nonstress Test Second Interpretation: Appropriate for gestational age          FHR Category: Category I             Additional Comments
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
## 2021-03-22 NOTE — TELEPHONE ENCOUNTER
SINDHU stated that pt needs to go to [facility] . SINDHU stated that he is going to [facility] now and will let them know that the pt is coming to see them. Informed pt to go to [facility].
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
## 2021-03-24 ENCOUNTER — TELEPHONE (OUTPATIENT)
Dept: OBGYN CLINIC | Facility: CLINIC | Age: 31
End: 2021-03-24

## 2021-03-24 DIAGNOSIS — O99.012 ANEMIA DURING PREGNANCY IN SECOND TRIMESTER: Primary | ICD-10-CM

## 2021-03-24 NOTE — TELEPHONE ENCOUNTER
----- Message from Bryan Pulido MD sent at 3/24/2021 11:49 AM CDT -----  Start daily iron (if already on iron, then increase to bid)

## 2021-03-24 NOTE — TELEPHONE ENCOUNTER
Pt informed of results and recs. Pt states she is already taking PNV with iron in the morning and takes Slow Fe BID, in the afternoon and then again in the evening. NJG please advise.

## 2021-03-25 ENCOUNTER — HOSPITAL ENCOUNTER (OUTPATIENT)
Dept: PERINATAL CARE | Facility: HOSPITAL | Age: 31
Discharge: HOME | End: 2021-03-25
Attending: OBSTETRICS & GYNECOLOGY
Payer: COMMERCIAL

## 2021-03-25 ENCOUNTER — TELEPHONE (OUTPATIENT)
Dept: OBGYN CLINIC | Facility: CLINIC | Age: 31
End: 2021-03-25

## 2021-03-25 VITALS
WEIGHT: 169 LBS | SYSTOLIC BLOOD PRESSURE: 122 MMHG | HEART RATE: 84 BPM | BODY MASS INDEX: 26 KG/M2 | DIASTOLIC BLOOD PRESSURE: 76 MMHG

## 2021-03-25 DIAGNOSIS — O10.919 CHRONIC HYPERTENSION AFFECTING PREGNANCY: ICD-10-CM

## 2021-03-25 DIAGNOSIS — O10.919 CHRONIC HYPERTENSION AFFECTING PREGNANCY: Primary | ICD-10-CM

## 2021-03-25 PROCEDURE — 99213 OFFICE O/P EST LOW 20 MIN: CPT | Performed by: OBSTETRICS & GYNECOLOGY

## 2021-03-25 PROCEDURE — 76816 OB US FOLLOW-UP PER FETUS: CPT | Performed by: OBSTETRICS & GYNECOLOGY

## 2021-03-25 NOTE — TELEPHONE ENCOUNTER
Pt asking why she had appt set on 7/1  She has been told since day 1 that she will be induced. So she's wondering if this is the date for inducing or if there's anything she needs to know. Plese advise.

## 2021-03-25 NOTE — TELEPHONE ENCOUNTER
Spoke to pt and informed her the Pre-admit date is done for all OB pts on their due date and does not mean that is the actual date of delivery.  Pt also asking about birth plan, pt instructed that if she is going to be using a birth plan she needs to presen

## 2021-03-25 NOTE — TELEPHONE ENCOUNTER
Pt informed of [facility] recs and verbalized understanding. Referral placed to Dr Corby Hedrick with hematology. pts questions answered. Pt provided with # to Dr Dubois [place] office to schedule appt.

## 2021-03-25 NOTE — TELEPHONE ENCOUNTER
If already taking iron (for a while) at that quantity & Hb 9.9 still, needs to see hematology Dr. Maycol Porter for assistance in improving her anemia prior to delivery

## 2021-03-27 ENCOUNTER — TELEPHONE (OUTPATIENT)
Dept: OBGYN CLINIC | Facility: CLINIC | Age: 31
End: 2021-03-27

## 2021-03-27 RX ORDER — LABETALOL 300 MG/1
150 TABLET, FILM COATED ORAL 2 TIMES DAILY
Qty: 30 TABLET | Refills: 0 | Status: SHIPPED | OUTPATIENT
Start: 2021-03-27 | End: 2021-06-17

## 2021-03-27 NOTE — TELEPHONE ENCOUNTER
Priscila Ruiz stated that she received page from pt that new labetalol RX was not received by pharmacy. Called pts [facility] pharmacy in [address] [place] [address] and confirmed with pharmacist that they received pts labetalol RX and it is ready for .  Confirmed with pt that

## 2021-03-27 NOTE — TELEPHONE ENCOUNTER
Pt stated that she was told by the doctor to start \"being more in tune\" with fetal movement around 28 weeks. Pt advised on how to do kick counts if she ever feels that she has not felt much FM.  Pt advised she would drink 2 full glasses of cold water or s

## 2021-03-27 NOTE — TELEPHONE ENCOUNTER
Patient would like to know when she should start charting how often the baby kicks and what normal sleep cycles are for the fetus. Please advise.     She also has a few questions about reducing her blood pressure medication

## 2021-03-30 ENCOUNTER — LAB ENCOUNTER (OUTPATIENT)
Dept: LAB | Facility: HOSPITAL | Age: 31
End: 2021-03-30
Attending: INTERNAL MEDICINE
Payer: COMMERCIAL

## 2021-03-30 ENCOUNTER — OFFICE VISIT (OUTPATIENT)
Dept: HEMATOLOGY/ONCOLOGY | Facility: HOSPITAL | Age: 31
End: 2021-03-30
Attending: INTERNAL MEDICINE
Payer: COMMERCIAL

## 2021-03-30 VITALS
WEIGHT: 173.63 LBS | RESPIRATION RATE: 16 BRPM | DIASTOLIC BLOOD PRESSURE: 86 MMHG | OXYGEN SATURATION: 99 % | HEART RATE: 83 BPM | BODY MASS INDEX: 27 KG/M2 | SYSTOLIC BLOOD PRESSURE: 124 MMHG | TEMPERATURE: 99 F

## 2021-03-30 DIAGNOSIS — O99.012 ANEMIA OF PREGNANCY IN SECOND TRIMESTER: Primary | ICD-10-CM

## 2021-03-30 DIAGNOSIS — O99.012 ANEMIA OF PREGNANCY IN SECOND TRIMESTER: ICD-10-CM

## 2021-03-30 PROCEDURE — 82728 ASSAY OF FERRITIN: CPT

## 2021-03-30 PROCEDURE — 85045 AUTOMATED RETICULOCYTE COUNT: CPT

## 2021-03-30 PROCEDURE — 84466 ASSAY OF TRANSFERRIN: CPT

## 2021-03-30 PROCEDURE — 83540 ASSAY OF IRON: CPT

## 2021-03-30 PROCEDURE — 82607 VITAMIN B-12: CPT

## 2021-03-30 PROCEDURE — 83615 LACTATE (LD) (LDH) ENZYME: CPT

## 2021-03-30 PROCEDURE — 82746 ASSAY OF FOLIC ACID SERUM: CPT

## 2021-03-30 PROCEDURE — 80053 COMPREHEN METABOLIC PANEL: CPT

## 2021-03-30 PROCEDURE — 83010 ASSAY OF HAPTOGLOBIN QUANT: CPT

## 2021-03-30 PROCEDURE — 85025 COMPLETE CBC W/AUTO DIFF WBC: CPT

## 2021-03-30 PROCEDURE — 36415 COLL VENOUS BLD VENIPUNCTURE: CPT

## 2021-03-30 PROCEDURE — 99244 OFF/OP CNSLTJ NEW/EST MOD 40: CPT | Performed by: INTERNAL MEDICINE

## 2021-03-30 NOTE — PROGRESS NOTES
Hematology Consultation Note    Patient Name: Lili Rosenbaum   YOB: 1990   Medical Record Number: [id number]   CSN: [id number]   Consulting Physician: Gema Stacy MD  Referring Physician(s): Dee Woodruff  Date of Consultation: 3 Grandfather    • Stroke Maternal Grandfather    • Diabetes Maternal Uncle    • Heart Disorder Maternal Uncle    • Heart Disease Maternal Uncle         status-post bypass   • Hypertension Brother    • Breast Cancer Maternal Aunt 79   • Glaucoma Neg    • Mac daily, less than a teaspoon, since childhood   Musculoskeletal: Negative for myalgias, arthralgias, muscle weakness. Neurological: Negative for headaches, dizziness, speech problems, gait problems and weakness.     A comprehensive 14 point review of system ALKPHO 97 03/30/2021 05:28 PM    AST 11 (L) 03/30/2021 05:28 PM    ALT 20 03/30/2021 05:28 PM       Results for Rigoberto Montgomrey (MRN [id number]) as of 3/31/2021 17:01   Ref.  Range 3/30/2021 17:28   LDH Latest Ref Range: 84 - 246 U/L 115   Iron, Se Moderate Risk    Rossi Andrade MD  [facility]  [phone] [facility]

## 2021-03-31 DIAGNOSIS — O99.012 ANEMIA OF PREGNANCY IN SECOND TRIMESTER: Primary | ICD-10-CM

## 2021-04-02 ENCOUNTER — PATIENT MESSAGE (OUTPATIENT)
Dept: OBGYN CLINIC | Facility: CLINIC | Age: 31
End: 2021-04-02

## 2021-04-02 NOTE — TELEPHONE ENCOUNTER
27w1d Spoke to pt regarding MyChart message. Informed pt okay to go to movie theater. Advised pt to social distant, wear mask, and proper hygiene due to Covid. Pt verbalized understanding.

## 2021-04-03 ENCOUNTER — PATIENT MESSAGE (OUTPATIENT)
Dept: OBGYN CLINIC | Facility: CLINIC | Age: 31
End: 2021-04-03

## 2021-04-03 NOTE — TELEPHONE ENCOUNTER
From: Bimal Garcia  To: Judith Singh. Raina Engel DO  Sent: 4/2/2021 5:01 PM CDT  Subject: Non-Urgent Medical Question    Hello,    I was wondering if prenatal massages are acceptable during pregnancy.  I'm currently 27 weeks, so I wanted to know the rec

## 2021-04-05 ENCOUNTER — ROUTINE PRENATAL (OUTPATIENT)
Dept: OBGYN CLINIC | Facility: CLINIC | Age: 31
End: 2021-04-05
Payer: COMMERCIAL

## 2021-04-05 VITALS — DIASTOLIC BLOOD PRESSURE: 82 MMHG | WEIGHT: 172 LBS | SYSTOLIC BLOOD PRESSURE: 126 MMHG | BODY MASS INDEX: 27 KG/M2

## 2021-04-05 DIAGNOSIS — Z34.92 ENCOUNTER FOR SUPERVISION OF NORMAL PREGNANCY IN SECOND TRIMESTER, UNSPECIFIED GRAVIDITY: Primary | ICD-10-CM

## 2021-04-05 PROCEDURE — 81002 URINALYSIS NONAUTO W/O SCOPE: CPT | Performed by: OBSTETRICS & GYNECOLOGY

## 2021-04-05 PROCEDURE — 3074F SYST BP LT 130 MM HG: CPT | Performed by: OBSTETRICS & GYNECOLOGY

## 2021-04-05 PROCEDURE — 3079F DIAST BP 80-89 MM HG: CPT | Performed by: OBSTETRICS & GYNECOLOGY

## 2021-04-05 NOTE — TELEPHONE ENCOUNTER
From: Sheree Balderas  To: Byron Ferrell. [id number] [facility]  Sent: 4/3/2021 9:05 PM CDT  Subject: Non-Urgent Medical Question    Hello,    Here are my blood pressures for this past week. I'm on 300 mg of Labetalol daily and 81mg of low dose aspirin once daily.

## 2021-04-06 NOTE — PROGRESS NOTES
No S/S of PTL. Second Covid dose 03-13. She'll do TDAP next visit. Reviewed birth plan and all requests within our range of normal. Copy to media tab.

## 2021-04-08 ENCOUNTER — PATIENT MESSAGE (OUTPATIENT)
Dept: OBGYN CLINIC | Facility: CLINIC | Age: 31
End: 2021-04-08

## 2021-04-09 NOTE — TELEPHONE ENCOUNTER
From: Isrrael Nguyeno  To: [facility]. Larissa Clarke DO  Sent: 4/8/2021 8:11 PM CDT  Subject: Non-Urgent Medical Question    Hello,    Here are my blood pressures for this week. I'm taking 300 mg of Labetalol daily with 81mg of low dose aspirin daily.   [address] [place]

## 2021-04-16 ENCOUNTER — ROUTINE PRENATAL (OUTPATIENT)
Dept: OBGYN CLINIC | Facility: CLINIC | Age: 31
End: 2021-04-16
Payer: COMMERCIAL

## 2021-04-16 VITALS
DIASTOLIC BLOOD PRESSURE: 77 MMHG | SYSTOLIC BLOOD PRESSURE: 117 MMHG | BODY MASS INDEX: 27 KG/M2 | HEART RATE: 80 BPM | WEIGHT: 175 LBS

## 2021-04-16 DIAGNOSIS — Z34.03 ENCOUNTER FOR SUPERVISION OF NORMAL FIRST PREGNANCY IN THIRD TRIMESTER: Primary | ICD-10-CM

## 2021-04-16 PROBLEM — Z34.90 PREGNANCY (HCC): Status: RESOLVED | Noted: 2021-03-22 | Resolved: 2021-04-16

## 2021-04-16 PROBLEM — Z34.90 PREGNANCY: Status: RESOLVED | Noted: 2021-03-22 | Resolved: 2021-04-16

## 2021-04-16 PROCEDURE — 90715 TDAP VACCINE 7 YRS/> IM: CPT | Performed by: OBSTETRICS & GYNECOLOGY

## 2021-04-16 PROCEDURE — 3078F DIAST BP <80 MM HG: CPT | Performed by: OBSTETRICS & GYNECOLOGY

## 2021-04-16 PROCEDURE — 81002 URINALYSIS NONAUTO W/O SCOPE: CPT | Performed by: OBSTETRICS & GYNECOLOGY

## 2021-04-16 PROCEDURE — 90471 IMMUNIZATION ADMIN: CPT | Performed by: OBSTETRICS & GYNECOLOGY

## 2021-04-16 PROCEDURE — 3074F SYST BP LT 130 MM HG: CPT | Performed by: OBSTETRICS & GYNECOLOGY

## 2021-04-16 NOTE — PROGRESS NOTES
Wishes for TDAP. Taking labetalol 150 mg bid. Has birth plan --  Needs to bring copy for our written review.  RTC 2 wks

## 2021-04-16 NOTE — PROGRESS NOTES
Samuel Martinez  Dear Dr. Jeanne Kirk     Thank you for requesting an ultrasound and maternal-fetal medicine consultation on your patient Caden DE OLIVEIRA.   As you are aware she is a 27year old female  with a willams assessments were reviewed as well as recommendations for serial growth ultrasounds and antepartum testing.     IMPRESSION:  · IUP at 30w1d  · Hypertension on labetalol     RECOMMENDATIONS:  · Continue care with Dr. Beka Keller  · Monthly growth ultrasounds beginn

## 2021-04-19 ENCOUNTER — TELEPHONE (OUTPATIENT)
Dept: OBGYN CLINIC | Facility: CLINIC | Age: 31
End: 2021-04-19

## 2021-04-19 ENCOUNTER — ROUTINE PRENATAL (OUTPATIENT)
Dept: OBGYN CLINIC | Facility: CLINIC | Age: 31
End: 2021-04-19
Payer: COMMERCIAL

## 2021-04-19 ENCOUNTER — PATIENT MESSAGE (OUTPATIENT)
Dept: OBGYN CLINIC | Facility: CLINIC | Age: 31
End: 2021-04-19

## 2021-04-19 VITALS
DIASTOLIC BLOOD PRESSURE: 90 MMHG | WEIGHT: 175.38 LBS | SYSTOLIC BLOOD PRESSURE: 128 MMHG | BODY MASS INDEX: 27 KG/M2 | HEART RATE: 91 BPM

## 2021-04-19 DIAGNOSIS — Z34.91 ENCOUNTER FOR SUPERVISION OF NORMAL PREGNANCY IN FIRST TRIMESTER, UNSPECIFIED GRAVIDITY: Primary | ICD-10-CM

## 2021-04-19 DIAGNOSIS — O10.919 CHRONIC HYPERTENSION AFFECTING PREGNANCY: ICD-10-CM

## 2021-04-19 PROCEDURE — 3080F DIAST BP >= 90 MM HG: CPT | Performed by: OBSTETRICS & GYNECOLOGY

## 2021-04-19 PROCEDURE — 3074F SYST BP LT 130 MM HG: CPT | Performed by: OBSTETRICS & GYNECOLOGY

## 2021-04-19 PROCEDURE — 81002 URINALYSIS NONAUTO W/O SCOPE: CPT | Performed by: OBSTETRICS & GYNECOLOGY

## 2021-04-19 NOTE — TELEPHONE ENCOUNTER
Pn denies today any blood, lof, pain, contractions, bowel problems, bladder issues, mucous discharge. Pt states that she has her regular discharge. Pt states she has fetal movement.       SINDHU stated she could be seen this week if no other complaints than

## 2021-04-19 NOTE — TELEPHONE ENCOUNTER
From: Silverio Astudillo  To: Luther Nguyen MD  Sent: 4/19/2021 5:36 PM CDT  Subject: Non-Urgent Medical Question    Hello,    I meant to ask at my appointment with Dr Lei Chakraborty, but is it okay for me to get a prenatal massage?  I looked up a reputable spa that

## 2021-04-19 NOTE — PROGRESS NOTES
Problem visit. Passed some mucus discharge yesterday. No cramping or contractions. Order for NST. VE-- normal discharge.   Cervix closed and thick

## 2021-04-19 NOTE — TELEPHONE ENCOUNTER
Paged on call yesterday around [phone] with c/o a glob of mucous DC. No blood, LOF, pain, UC's, bowel or bladder complaints. No recent IC. Instructions for rest overnight and I tried to call her for update this AM and no answer. Please check in with her.  If n

## 2021-04-19 NOTE — TELEPHONE ENCOUNTER
No contractions. Baby in motion. Pt asking if she should come in. No more bloody show or mucous.     Next fetal medicine 4/23  Dr. Monie Zuñiga 5/1

## 2021-04-21 ENCOUNTER — TELEPHONE (OUTPATIENT)
Dept: OBGYN CLINIC | Facility: CLINIC | Age: 31
End: 2021-04-21

## 2021-04-21 NOTE — TELEPHONE ENCOUNTER
Received refill request from pts pharmacy for Labetalol 300mg (pt is taking 1/2 tablet twice daily). Refill sent to pharmacy.

## 2021-04-23 ENCOUNTER — PATIENT MESSAGE (OUTPATIENT)
Dept: OBGYN CLINIC | Facility: CLINIC | Age: 31
End: 2021-04-23

## 2021-04-23 ENCOUNTER — HOSPITAL ENCOUNTER (OUTPATIENT)
Dept: PERINATAL CARE | Facility: HOSPITAL | Age: 31
Discharge: HOME | End: 2021-04-23
Attending: OBSTETRICS & GYNECOLOGY
Payer: COMMERCIAL

## 2021-04-23 VITALS
BODY MASS INDEX: 27 KG/M2 | DIASTOLIC BLOOD PRESSURE: 92 MMHG | WEIGHT: 175 LBS | HEART RATE: 88 BPM | SYSTOLIC BLOOD PRESSURE: 131 MMHG

## 2021-04-23 DIAGNOSIS — O10.919 CHRONIC HYPERTENSION AFFECTING PREGNANCY: ICD-10-CM

## 2021-04-23 DIAGNOSIS — O10.919 CHRONIC HYPERTENSION AFFECTING PREGNANCY: Primary | ICD-10-CM

## 2021-04-23 PROCEDURE — 76816 OB US FOLLOW-UP PER FETUS: CPT | Performed by: OBSTETRICS & GYNECOLOGY

## 2021-04-23 PROCEDURE — 99213 OFFICE O/P EST LOW 20 MIN: CPT | Performed by: OBSTETRICS & GYNECOLOGY

## 2021-04-23 NOTE — TELEPHONE ENCOUNTER
That's fine. There are studies supporting both 81 mg and 120mg.   Its okay to inc to 120mg if she'd like

## 2021-04-23 NOTE — TELEPHONE ENCOUNTER
From: Flakito Billings  To: Karlton Bumpers, MD  Sent: 4/23/2021 2:14 PM CDT  Subject: Non-Urgent Medical Question    Hello,    I had my 30 week ultrasound today and Dr Ivy Kay notes state that I should be taking 120 mg of low dose aspirin.  I've bee

## 2021-04-23 NOTE — TELEPHONE ENCOUNTER
To DALILA to please advise. Pt is 30w1d, states she takes ASA 81 mg and asking if she should increase ASA to 120 mg?  MFM consult today and on 3/25 have recs listed as  mg.   MFM consult on 2/12 lists recs ASA 81- 120mg.  (pt has chronic htn and also ta

## 2021-04-24 ENCOUNTER — PATIENT MESSAGE (OUTPATIENT)
Dept: OBGYN CLINIC | Facility: CLINIC | Age: 31
End: 2021-04-24

## 2021-04-24 NOTE — TELEPHONE ENCOUNTER
Pt calling in regards to message below. Pt stated she wants a definitive answer on if she should take 81mg or 120mg of aspirin.  Pt again informed of MAZs recs below and informed that if RODOLFO is recommending that she be on 120mg of aspirin then we our doctor

## 2021-04-24 NOTE — TELEPHONE ENCOUNTER
From: Glenis Montero  To: Trino Simpson MD  Sent: 4/24/2021 9:02 AM CDT  Subject: Non-Urgent Medical Question    Hello,    I was wondering if using a heating pad is acceptable for upper back pain relief due to the growing belly?  What are some branch

## 2021-04-25 ENCOUNTER — PATIENT MESSAGE (OUTPATIENT)
Dept: OBGYN CLINIC | Facility: CLINIC | Age: 31
End: 2021-04-25

## 2021-04-26 NOTE — TELEPHONE ENCOUNTER
From: Ines Christine  To: Kraig oRbins MD  Sent: 4/24/2021 8:16 PM CDT  Subject: Non-Urgent Medical Question    Hello,    Here are my blood pressures from this week. I have been taking 300 mg of Labetalol daily and 81mg of low dose aspirin.  Per

## 2021-04-26 NOTE — TELEPHONE ENCOUNTER
From: Lili Rosenbaum  To: Jatin Aparicio MD  Sent: 4/25/2021 7:36 PM CDT  Subject: Non-Urgent Medical Question    Hello,    I have a question regarding car seats for taking the baby home after delivery.  My  and I are considering a converti

## 2021-05-01 ENCOUNTER — ROUTINE PRENATAL (OUTPATIENT)
Dept: OBGYN CLINIC | Facility: CLINIC | Age: 31
End: 2021-05-01
Payer: COMMERCIAL

## 2021-05-01 VITALS
DIASTOLIC BLOOD PRESSURE: 82 MMHG | HEART RATE: 78 BPM | WEIGHT: 176 LBS | BODY MASS INDEX: 28 KG/M2 | SYSTOLIC BLOOD PRESSURE: 122 MMHG

## 2021-05-01 DIAGNOSIS — Z34.03 ENCOUNTER FOR SUPERVISION OF NORMAL FIRST PREGNANCY IN THIRD TRIMESTER: Primary | ICD-10-CM

## 2021-05-01 PROCEDURE — 81002 URINALYSIS NONAUTO W/O SCOPE: CPT | Performed by: OBSTETRICS & GYNECOLOGY

## 2021-05-01 PROCEDURE — 3079F DIAST BP 80-89 MM HG: CPT | Performed by: OBSTETRICS & GYNECOLOGY

## 2021-05-01 PROCEDURE — 3074F SYST BP LT 130 MM HG: CPT | Performed by: OBSTETRICS & GYNECOLOGY

## 2021-05-01 NOTE — PROGRESS NOTES
Birth plan reviewed in detail -- corrections / addendums made. Pt to make copy at  & original to be sent for scanning.  RTC 2 wks

## 2021-05-07 ENCOUNTER — APPOINTMENT (OUTPATIENT)
Dept: OBGYN CLINIC | Facility: HOSPITAL | Age: 31
End: 2021-05-07
Payer: COMMERCIAL

## 2021-05-07 ENCOUNTER — HOSPITAL ENCOUNTER (OUTPATIENT)
Facility: HOSPITAL | Age: 31
Discharge: HOME | End: 2021-05-07
Attending: OBSTETRICS & GYNECOLOGY | Admitting: OBSTETRICS & GYNECOLOGY
Payer: COMMERCIAL

## 2021-05-07 ENCOUNTER — NST DOCUMENTATION (OUTPATIENT)
Dept: OBGYN CLINIC | Facility: CLINIC | Age: 31
End: 2021-05-07

## 2021-05-07 VITALS — HEART RATE: 83 BPM | SYSTOLIC BLOOD PRESSURE: 128 MMHG | DIASTOLIC BLOOD PRESSURE: 83 MMHG

## 2021-05-07 PROCEDURE — 59025 FETAL NON-STRESS TEST: CPT

## 2021-05-07 PROCEDURE — 59025 FETAL NON-STRESS TEST: CPT | Performed by: OBSTETRICS & GYNECOLOGY

## 2021-05-07 NOTE — NST
Nonstress Test   Patient: Richard Zeeshan    Gestation: 32w1d    Diagnosis from order: Chronic hypertension affecting pregnancy  Inpatient order, no diagnosis associated       NST:         NST DOCUMENTATION 5/7/2021   Variability 6-25 BPM   Acceler

## 2021-05-10 ENCOUNTER — LAB ENCOUNTER (OUTPATIENT)
Dept: LAB | Facility: HOSPITAL | Age: 31
End: 2021-05-10
Attending: INTERNAL MEDICINE
Payer: COMMERCIAL

## 2021-05-10 DIAGNOSIS — O99.012 ANEMIA OF PREGNANCY IN SECOND TRIMESTER: ICD-10-CM

## 2021-05-10 PROCEDURE — 85025 COMPLETE CBC W/AUTO DIFF WBC: CPT

## 2021-05-10 PROCEDURE — 36415 COLL VENOUS BLD VENIPUNCTURE: CPT

## 2021-05-10 PROCEDURE — 84466 ASSAY OF TRANSFERRIN: CPT

## 2021-05-10 PROCEDURE — 83540 ASSAY OF IRON: CPT

## 2021-05-11 ENCOUNTER — OFFICE VISIT (OUTPATIENT)
Dept: HEMATOLOGY/ONCOLOGY | Facility: HOSPITAL | Age: 31
End: 2021-05-11
Attending: INTERNAL MEDICINE
Payer: COMMERCIAL

## 2021-05-11 VITALS
TEMPERATURE: 98 F | OXYGEN SATURATION: 99 % | HEART RATE: 81 BPM | WEIGHT: 180 LBS | BODY MASS INDEX: 28.25 KG/M2 | DIASTOLIC BLOOD PRESSURE: 82 MMHG | RESPIRATION RATE: 16 BRPM | HEIGHT: 67 IN | SYSTOLIC BLOOD PRESSURE: 126 MMHG

## 2021-05-11 DIAGNOSIS — O99.013 ANEMIA OF PREGNANCY IN THIRD TRIMESTER: Primary | ICD-10-CM

## 2021-05-11 PROCEDURE — 99214 OFFICE O/P EST MOD 30 MIN: CPT | Performed by: INTERNAL MEDICINE

## 2021-05-11 NOTE — PROGRESS NOTES
Hematology Progress Note    Patient Name: Marisa James   YOB: 1990   Medical Record Number: [id number]   CSN: [id number]   Consulting Physician: Kasia Méndez MD  Referring Physician(s): Tereso Linton  Date of Visit: 5/11/2021 chicken pox during childhood.        Past Surgical History:  Past Surgical History:   Procedure Laterality Date   • EACH ADD TOOTH EXTRACTION  2009    no associated bleeding complications       Family Medical History:  Family History   Problem Relation Age disturbance, irritation and redness. Respiratory: Negative for cough, hemoptysis, no chest pain, +dyspnea on exertion.   Gastrointestinal: Negative for dysphagia, odynophagia, reflux symptoms, nausea, vomiting, change in bowel habits, diarrhea, constipatio RDW 13.7 05/10/2021 04:33 PM    NEPRELIM 6.80 05/10/2021 04:33 PM    .0 05/10/2021 04:33 PM       Lab Results   Component Value Date/Time    GLU 94 03/30/2021 05:28 PM    BUN 10 03/30/2021 05:28 PM    CREATSERUM 0.54 (L) 03/30/2021 05:28 PM    GFRNA of this form of treatment with caution        MDM: Moderate Risk    Ly Marks MD  [facility]  [address] [address]  [address] [facility]

## 2021-05-13 ENCOUNTER — TELEPHONE (OUTPATIENT)
Dept: OBGYN CLINIC | Facility: CLINIC | Age: 31
End: 2021-05-13

## 2021-05-13 NOTE — TELEPHONE ENCOUNTER
Pt is 33w0d, reports fetus is having hiccups and asking if this is okay. Reassured pt it is common to have hiccups and it is not a concern. Reports +FM. Denies contractions and lof's/bleeding.

## 2021-05-14 ENCOUNTER — HOSPITAL ENCOUNTER (OUTPATIENT)
Facility: HOSPITAL | Age: 31
Discharge: HOME | End: 2021-05-14
Attending: OBSTETRICS & GYNECOLOGY | Admitting: OBSTETRICS & GYNECOLOGY
Payer: COMMERCIAL

## 2021-05-14 ENCOUNTER — PATIENT MESSAGE (OUTPATIENT)
Dept: OBGYN CLINIC | Facility: CLINIC | Age: 31
End: 2021-05-14

## 2021-05-14 ENCOUNTER — APPOINTMENT (OUTPATIENT)
Dept: OBGYN CLINIC | Facility: HOSPITAL | Age: 31
End: 2021-05-14
Attending: OBSTETRICS & GYNECOLOGY
Payer: COMMERCIAL

## 2021-05-14 PROCEDURE — 59025 FETAL NON-STRESS TEST: CPT

## 2021-05-14 NOTE — PROGRESS NOTES
Pt is a 27year old female admitted to TR1/TR1-A. Patient presents with:  Non-stress Test: for elevated BP's.  pt. on labetalol p.o. Pt is  33w1d intra-uterine pregnancy. History obtained, pt. Oriented to room, staff, and plan of care.

## 2021-05-15 NOTE — TELEPHONE ENCOUNTER
From: Marielle Bullock  To: Lane Olivas MD  Sent: 5/14/2021 9:06 PM CDT  Subject: Non-Urgent Medical Question    Hello,    Here are my blood pressures for the week. I am taking 120mg of low dose aspirin and 300mg of Labetalol daily.     112/67-5/

## 2021-05-17 ENCOUNTER — ROUTINE PRENATAL (OUTPATIENT)
Dept: OBGYN CLINIC | Facility: CLINIC | Age: 31
End: 2021-05-17
Payer: COMMERCIAL

## 2021-05-17 VITALS
WEIGHT: 181 LBS | SYSTOLIC BLOOD PRESSURE: 132 MMHG | HEART RATE: 90 BPM | DIASTOLIC BLOOD PRESSURE: 86 MMHG | BODY MASS INDEX: 28 KG/M2

## 2021-05-17 DIAGNOSIS — Z34.03 ENCOUNTER FOR SUPERVISION OF NORMAL FIRST PREGNANCY IN THIRD TRIMESTER: Primary | ICD-10-CM

## 2021-05-17 PROCEDURE — 3079F DIAST BP 80-89 MM HG: CPT | Performed by: OBSTETRICS & GYNECOLOGY

## 2021-05-17 PROCEDURE — 3075F SYST BP GE 130 - 139MM HG: CPT | Performed by: OBSTETRICS & GYNECOLOGY

## 2021-05-17 PROCEDURE — 81002 URINALYSIS NONAUTO W/O SCOPE: CPT | Performed by: OBSTETRICS & GYNECOLOGY

## 2021-05-17 NOTE — PROGRESS NOTES
Outpatient Maternal-Fetal Medicine Follow-Up     Dear Dominique Zarco     Thank you for requesting an ultrasound and maternal-fetal medicine consultation on your patient Kang SAUCEDA ALVNIO.   As you are aware she is a 27year Marlou Barker a willams  and home blood pressure assessments were reviewed as well as recommendations for serial growth ultrasounds and antepartum testing.     IMPRESSION:  · IUP at 34w1d  · Hypertension on labetalol     RECOMMENDATIONS:  · Continue care with Dr. Terrazas  · Monthly g

## 2021-05-17 NOTE — PROGRESS NOTES
Here with partner. Has ultrasound on Fri. Doing weekly NST. Taking iron TID.   BP at home 110s/70s   RTC 2 wk

## 2021-05-21 ENCOUNTER — HOSPITAL ENCOUNTER (OUTPATIENT)
Dept: PERINATAL CARE | Facility: HOSPITAL | Age: 31
Discharge: HOME | End: 2021-05-21
Attending: OBSTETRICS & GYNECOLOGY
Payer: COMMERCIAL

## 2021-05-21 VITALS
SYSTOLIC BLOOD PRESSURE: 130 MMHG | BODY MASS INDEX: 28 KG/M2 | WEIGHT: 181 LBS | HEART RATE: 81 BPM | DIASTOLIC BLOOD PRESSURE: 86 MMHG

## 2021-05-21 DIAGNOSIS — O10.919 CHRONIC HYPERTENSION AFFECTING PREGNANCY: ICD-10-CM

## 2021-05-21 DIAGNOSIS — O10.919 CHRONIC HYPERTENSION AFFECTING PREGNANCY: Primary | ICD-10-CM

## 2021-05-21 PROCEDURE — 76816 OB US FOLLOW-UP PER FETUS: CPT | Performed by: OBSTETRICS & GYNECOLOGY

## 2021-05-21 PROCEDURE — 76819 FETAL BIOPHYS PROFIL W/O NST: CPT | Performed by: OBSTETRICS & GYNECOLOGY

## 2021-05-21 PROCEDURE — 99213 OFFICE O/P EST LOW 20 MIN: CPT | Performed by: OBSTETRICS & GYNECOLOGY

## 2021-05-27 ENCOUNTER — PATIENT MESSAGE (OUTPATIENT)
Dept: OBGYN CLINIC | Facility: CLINIC | Age: 31
End: 2021-05-27

## 2021-05-28 ENCOUNTER — NST DOCUMENTATION (OUTPATIENT)
Dept: OBGYN CLINIC | Facility: CLINIC | Age: 31
End: 2021-05-28

## 2021-05-28 ENCOUNTER — APPOINTMENT (OUTPATIENT)
Dept: OBGYN CLINIC | Facility: HOSPITAL | Age: 31
End: 2021-05-28
Payer: COMMERCIAL

## 2021-05-28 ENCOUNTER — HOSPITAL ENCOUNTER (OUTPATIENT)
Facility: HOSPITAL | Age: 31
Discharge: HOME | End: 2021-05-28
Attending: OBSTETRICS & GYNECOLOGY | Admitting: OBSTETRICS & GYNECOLOGY
Payer: COMMERCIAL

## 2021-05-28 VITALS — HEART RATE: 75 BPM | SYSTOLIC BLOOD PRESSURE: 136 MMHG | DIASTOLIC BLOOD PRESSURE: 80 MMHG

## 2021-05-28 DIAGNOSIS — O10.919 CHRONIC HYPERTENSION AFFECTING PREGNANCY: ICD-10-CM

## 2021-05-28 PROCEDURE — 59025 FETAL NON-STRESS TEST: CPT | Performed by: OBSTETRICS & GYNECOLOGY

## 2021-05-28 PROCEDURE — 59025 FETAL NON-STRESS TEST: CPT

## 2021-05-28 NOTE — TELEPHONE ENCOUNTER
From: Cooper Orta  To: Lana Reynolds MD  Sent: 5/27/2021 7:34 PM CDT  Subject: Non-Urgent Medical Question    Hello,    Here are my pressures for this week. I'm taking 300mg of Labetalol daily and 120mg of low dose aspirin.     115/74-5/21  10

## 2021-05-28 NOTE — NST
Nonstress Test   Patient: Deeptid Birkenhead    Gestation: 35w1d    Diagnosis from order: Chronic hypertension affecting pregnancy  Inpatient order, no diagnosis associated       NST:         NST DOCUMENTATION 5/28/2021   Variability 6-25 BPM   Accele

## 2021-05-29 ENCOUNTER — ROUTINE PRENATAL (OUTPATIENT)
Dept: OBGYN CLINIC | Facility: CLINIC | Age: 31
End: 2021-05-29
Payer: COMMERCIAL

## 2021-05-29 VITALS
BODY MASS INDEX: 28 KG/M2 | SYSTOLIC BLOOD PRESSURE: 120 MMHG | DIASTOLIC BLOOD PRESSURE: 80 MMHG | WEIGHT: 181 LBS | HEART RATE: 78 BPM

## 2021-05-29 DIAGNOSIS — Z34.93 ENCOUNTER FOR SUPERVISION OF NORMAL PREGNANCY IN THIRD TRIMESTER, UNSPECIFIED GRAVIDITY: Primary | ICD-10-CM

## 2021-05-29 PROCEDURE — 81002 URINALYSIS NONAUTO W/O SCOPE: CPT | Performed by: OBSTETRICS & GYNECOLOGY

## 2021-05-29 PROCEDURE — 3074F SYST BP LT 130 MM HG: CPT | Performed by: OBSTETRICS & GYNECOLOGY

## 2021-05-29 PROCEDURE — 3079F DIAST BP 80-89 MM HG: CPT | Performed by: OBSTETRICS & GYNECOLOGY

## 2021-05-30 ENCOUNTER — PATIENT MESSAGE (OUTPATIENT)
Dept: OBGYN CLINIC | Facility: CLINIC | Age: 31
End: 2021-05-30

## 2021-06-01 NOTE — TELEPHONE ENCOUNTER
From: Dusty Stafford  To: Lizzie James MD  Sent: 5/30/2021 3:47 AM CDT  Subject: Test Results Question    Hello,    I am really concerned about getting more information on the present value of \"small\" regarding the Leukocytes esterase in my u

## 2021-06-04 ENCOUNTER — ROUTINE PRENATAL (OUTPATIENT)
Dept: OBGYN CLINIC | Facility: CLINIC | Age: 31
End: 2021-06-04
Payer: COMMERCIAL

## 2021-06-04 ENCOUNTER — LAB ENCOUNTER (OUTPATIENT)
Dept: LAB | Age: 31
End: 2021-06-04
Attending: OBSTETRICS & GYNECOLOGY
Payer: COMMERCIAL

## 2021-06-04 ENCOUNTER — PATIENT MESSAGE (OUTPATIENT)
Dept: OBGYN CLINIC | Facility: CLINIC | Age: 31
End: 2021-06-04

## 2021-06-04 VITALS
WEIGHT: 183 LBS | BODY MASS INDEX: 29 KG/M2 | DIASTOLIC BLOOD PRESSURE: 84 MMHG | HEART RATE: 86 BPM | SYSTOLIC BLOOD PRESSURE: 121 MMHG

## 2021-06-04 DIAGNOSIS — Z34.03 ENCOUNTER FOR SUPERVISION OF NORMAL FIRST PREGNANCY IN THIRD TRIMESTER: ICD-10-CM

## 2021-06-04 DIAGNOSIS — Z34.93 ENCOUNTER FOR SUPERVISION OF NORMAL PREGNANCY IN THIRD TRIMESTER, UNSPECIFIED GRAVIDITY: Primary | ICD-10-CM

## 2021-06-04 PROCEDURE — 3079F DIAST BP 80-89 MM HG: CPT | Performed by: OBSTETRICS & GYNECOLOGY

## 2021-06-04 PROCEDURE — 87389 HIV-1 AG W/HIV-1&-2 AB AG IA: CPT

## 2021-06-04 PROCEDURE — 86780 TREPONEMA PALLIDUM: CPT

## 2021-06-04 PROCEDURE — 85027 COMPLETE CBC AUTOMATED: CPT

## 2021-06-04 PROCEDURE — 81002 URINALYSIS NONAUTO W/O SCOPE: CPT | Performed by: OBSTETRICS & GYNECOLOGY

## 2021-06-04 PROCEDURE — 36415 COLL VENOUS BLD VENIPUNCTURE: CPT

## 2021-06-04 PROCEDURE — 3074F SYST BP LT 130 MM HG: CPT | Performed by: OBSTETRICS & GYNECOLOGY

## 2021-06-04 NOTE — PROGRESS NOTES
No issues. Sending BP logs weekly. Doing NSTs. GBS collected. Has appt with Dr Mylene Robles Monday. To lab for 3T labs.   RTC 1 wk and IOL scheduling at that appt

## 2021-06-05 ENCOUNTER — APPOINTMENT (OUTPATIENT)
Dept: OBGYN CLINIC | Facility: HOSPITAL | Age: 31
End: 2021-06-05
Attending: OBSTETRICS & GYNECOLOGY
Payer: COMMERCIAL

## 2021-06-05 ENCOUNTER — HOSPITAL ENCOUNTER (OUTPATIENT)
Facility: HOSPITAL | Age: 31
Discharge: HOME | End: 2021-06-05
Attending: OBSTETRICS & GYNECOLOGY | Admitting: OBSTETRICS & GYNECOLOGY
Payer: COMMERCIAL

## 2021-06-05 VITALS — HEART RATE: 80 BPM | SYSTOLIC BLOOD PRESSURE: 124 MMHG | DIASTOLIC BLOOD PRESSURE: 84 MMHG

## 2021-06-05 DIAGNOSIS — O10.919 CHRONIC HYPERTENSION AFFECTING PREGNANCY: ICD-10-CM

## 2021-06-05 PROCEDURE — 59025 FETAL NON-STRESS TEST: CPT

## 2021-06-05 NOTE — TELEPHONE ENCOUNTER
From: Brenda Magallanes  To: Marla Kaur DO  Sent: 6/4/2021 7:11 PM CDT  Subject: Non-Urgent Medical Question    Hello,    Here are my pressures for this week. I'm on 300mg of Labetalol daily and 120mg of low  Dose aspirin.     115/75-5/28  115

## 2021-06-07 ENCOUNTER — NURSE ONLY (OUTPATIENT)
Dept: HEMATOLOGY/ONCOLOGY | Facility: HOSPITAL | Age: 31
End: 2021-06-07
Attending: INTERNAL MEDICINE
Payer: COMMERCIAL

## 2021-06-07 VITALS
HEIGHT: 67 IN | WEIGHT: 184.81 LBS | SYSTOLIC BLOOD PRESSURE: 125 MMHG | RESPIRATION RATE: 16 BRPM | TEMPERATURE: 97 F | HEART RATE: 83 BPM | BODY MASS INDEX: 29.01 KG/M2 | DIASTOLIC BLOOD PRESSURE: 84 MMHG | OXYGEN SATURATION: 99 %

## 2021-06-07 DIAGNOSIS — O99.013 ANEMIA OF PREGNANCY IN THIRD TRIMESTER: Primary | ICD-10-CM

## 2021-06-07 DIAGNOSIS — O99.013 ANEMIA OF PREGNANCY IN THIRD TRIMESTER: ICD-10-CM

## 2021-06-07 PROCEDURE — 36415 COLL VENOUS BLD VENIPUNCTURE: CPT

## 2021-06-07 PROCEDURE — 99214 OFFICE O/P EST MOD 30 MIN: CPT | Performed by: INTERNAL MEDICINE

## 2021-06-07 NOTE — PROGRESS NOTES
Hematology Progress Note    Patient Name: Courtney Johnson   YOB: 1990   Medical Record Number: [id number]   CSN: [id number]   Consulting Physician: Mike Singh MD  Referring Physician(s): Vandana Werner  Date of Visit: 6/07/2021 History:  Past Medical History:   Diagnosis Date   • Essential hypertension 2018   • Varicella     Had chicken pox during childhood.        Past Surgical History:  Past Surgical History:   Procedure Laterality Date   • EACH ADD TOOTH EXTRACTION  2009    no sweats and weight loss. No fatigue, no night sweats   Eyes: Negative for visual disturbance, irritation and redness. Respiratory: Negative for cough, hemoptysis, no chest pain, +dyspnea on exertion.   Gastrointestinal: Negative for dysphagia, odynophagia, 06/07/2021 11:36 AM    MCH 29.7 06/07/2021 11:36 AM    MCHC 32.9 06/07/2021 11:36 AM    RDW 14.0 06/07/2021 11:36 AM    NEPRELIM 6.09 06/07/2021 11:36 AM    .0 06/07/2021 11:36 AM       Lab Results   Component Value Date/Time    GLU 94 03/30/2021 05 hazelnuts. As there is not a strong indication for IV iron products, I would avoid use of this form of treatment with caution    --pt is planned for induction of labor in the next week or 2. Therefore, does not need follow-up in hematology at this time.

## 2021-06-12 ENCOUNTER — APPOINTMENT (OUTPATIENT)
Dept: OBGYN CLINIC | Facility: HOSPITAL | Age: 31
End: 2021-06-12
Payer: COMMERCIAL

## 2021-06-12 ENCOUNTER — HOSPITAL ENCOUNTER (OUTPATIENT)
Facility: HOSPITAL | Age: 31
Discharge: HOME | End: 2021-06-12
Attending: OBSTETRICS & GYNECOLOGY | Admitting: OBSTETRICS & GYNECOLOGY
Payer: COMMERCIAL

## 2021-06-12 ENCOUNTER — ROUTINE PRENATAL (OUTPATIENT)
Dept: OBGYN CLINIC | Facility: CLINIC | Age: 31
End: 2021-06-12
Payer: COMMERCIAL

## 2021-06-12 VITALS
DIASTOLIC BLOOD PRESSURE: 74 MMHG | HEART RATE: 101 BPM | SYSTOLIC BLOOD PRESSURE: 112 MMHG | WEIGHT: 184.38 LBS | BODY MASS INDEX: 29 KG/M2

## 2021-06-12 DIAGNOSIS — O10.919 CHRONIC HYPERTENSION AFFECTING PREGNANCY: ICD-10-CM

## 2021-06-12 DIAGNOSIS — Z34.91 ENCOUNTER FOR SUPERVISION OF NORMAL PREGNANCY IN FIRST TRIMESTER, UNSPECIFIED GRAVIDITY: Primary | ICD-10-CM

## 2021-06-12 PROCEDURE — 3078F DIAST BP <80 MM HG: CPT | Performed by: OBSTETRICS & GYNECOLOGY

## 2021-06-12 PROCEDURE — 3074F SYST BP LT 130 MM HG: CPT | Performed by: OBSTETRICS & GYNECOLOGY

## 2021-06-12 PROCEDURE — 59025 FETAL NON-STRESS TEST: CPT

## 2021-06-12 NOTE — PROGRESS NOTES
Pt will send BP log thru my chart today, has NST after this appt today,+ hemorrhoid- mild sx, discussed IOL 38-39 weeks- pt wishes to discuss dates with her  and will call back with desired date.

## 2021-06-13 ENCOUNTER — NST DOCUMENTATION (OUTPATIENT)
Dept: OBGYN CLINIC | Facility: CLINIC | Age: 31
End: 2021-06-13

## 2021-06-13 PROCEDURE — 59025 FETAL NON-STRESS TEST: CPT | Performed by: OBSTETRICS & GYNECOLOGY

## 2021-06-14 ENCOUNTER — TELEPHONE (OUTPATIENT)
Dept: OBGYN CLINIC | Facility: CLINIC | Age: 31
End: 2021-06-14

## 2021-06-14 NOTE — TELEPHONE ENCOUNTER
The longer she waits, higher the chance of superimposed pre-eclampsia. Recommendation is between 38-39 wks so if she prefers 39 wks that is fine.  She needs 38 wk office visit

## 2021-06-14 NOTE — TELEPHONE ENCOUNTER
Pt called and informed that response has not been received by [facility]. Pt requesting that message be sent to any of the OB's. To [address] on-call to please review and advise.  Thank you

## 2021-06-14 NOTE — NST
Nonstress Test   Patient: Maria C Benson    Gestation: 37w3d    Diagnosis from order: Chronic hypertension affecting pregnancy       NST:         NST DOCUMENTATION 6/12/2021   Variability 6-25 BPM   Accelerations Yes   Decelerations None   Claudia

## 2021-06-14 NOTE — TELEPHONE ENCOUNTER
Pt is 37w 4d. Pt states she saw CAP in office on 6/12/2021. IOL was discussed for 38-39 wks, stating pt would call back with desired date.  Pt states is asking if she can be induced on 6/24 when she will be 39w to see if her cervix has a chance to ripen on

## 2021-06-14 NOTE — TELEPHONE ENCOUNTER
Patient states she noticed she will be 38 weeks at the time of her induction and wants to know if she can possible be inducted by her 39 weeks instead.  Patient states she is ok with note being sent to any provider available to be able to answer her questio

## 2021-06-15 ENCOUNTER — PATIENT MESSAGE (OUTPATIENT)
Dept: OBGYN CLINIC | Facility: CLINIC | Age: 31
End: 2021-06-15

## 2021-06-15 ENCOUNTER — NST DOCUMENTATION (OUTPATIENT)
Dept: OBGYN CLINIC | Facility: CLINIC | Age: 31
End: 2021-06-15

## 2021-06-15 PROCEDURE — 59025 FETAL NON-STRESS TEST: CPT | Performed by: OBSTETRICS & GYNECOLOGY

## 2021-06-15 NOTE — NST
Nonstress Test   Patient: Ines Christine    Gestation: 37w5d    Diagnosis from order: Chronic hypertension affecting pregnancy       NST:         NST DOCUMENTATION 6/12/2021   Variability 6-25 BPM   Accelerations Yes   Decelerations Drew Taylor

## 2021-06-16 ENCOUNTER — TELEPHONE (OUTPATIENT)
Dept: OBGYN CLINIC | Facility: CLINIC | Age: 31
End: 2021-06-16

## 2021-06-16 NOTE — TELEPHONE ENCOUNTER
Pt wanted to change her NST to saturday was told by [facility] she would have to get approval through OB.  Please advise

## 2021-06-16 NOTE — TELEPHONE ENCOUNTER
Pt has been doing NST's on Saturdays. Last 2 appts were on 6/5/21 and 6/12/21. Called MFM to discuss message below. Kami Pace rescheduled NST appt to 6/19 at 10 am. Pt notified and agrees.

## 2021-06-17 ENCOUNTER — TELEPHONE (OUTPATIENT)
Dept: OBGYN CLINIC | Facility: CLINIC | Age: 31
End: 2021-06-17

## 2021-06-17 RX ORDER — LABETALOL 300 MG/1
150 TABLET, FILM COATED ORAL 2 TIMES DAILY
Qty: 30 TABLET | Refills: 0 | Status: SHIPPED | OUTPATIENT
Start: 2021-06-17 | End: 2021-06-17

## 2021-06-17 RX ORDER — LABETALOL 300 MG/1
150 TABLET, FILM COATED ORAL 2 TIMES DAILY
Qty: 30 TABLET | Refills: 0 | Status: SHIPPED | OUTPATIENT
Start: 2021-06-17 | End: 2021-06-18

## 2021-06-17 NOTE — TELEPHONE ENCOUNTER
She is seeing Dr Caryl Burnett this Saturday and she can schedule her IOL at that time.   It looks like Wednesday night is already full

## 2021-06-17 NOTE — TELEPHONE ENCOUNTER
Routed to [employer] in error. [facility] is not on call. Message to [address] [place], [place] [address]k St on call to please review below. Thanks.

## 2021-06-17 NOTE — TELEPHONE ENCOUNTER
Informed pt that [address] stated okay for refill. Note below states that rx from pharmacy for refill for Labetalol 300mg bid. Pt states that she takes Labetalol 150mg bid.   Rx had been sent previously for a Labetalol 300mg pill and pt takes 1/2 tablet b

## 2021-06-17 NOTE — TELEPHONE ENCOUNTER
Received fax from pharmacy for refill of Labetalol 300 mg BID. Pt is 38w0d. To [facility] on call to ask if okay to refill for 30 day supply? If yes, how many refills?

## 2021-06-18 ENCOUNTER — TELEPHONE (OUTPATIENT)
Dept: OBGYN CLINIC | Facility: CLINIC | Age: 31
End: 2021-06-18

## 2021-06-18 DIAGNOSIS — O10.919 CHRONIC HYPERTENSION AFFECTING PREGNANCY: Primary | ICD-10-CM

## 2021-06-18 RX ORDER — LABETALOL 300 MG/1
300 TABLET, FILM COATED ORAL 2 TIMES DAILY
Qty: 30 TABLET | Refills: 0 | Status: ON HOLD | OUTPATIENT
Start: 2021-06-18 | End: 2021-06-24

## 2021-06-18 RX ORDER — LABETALOL 300 MG/1
300 TABLET, FILM COATED ORAL 2 TIMES DAILY
Qty: 30 TABLET | Refills: 0 | Status: SHIPPED | OUTPATIENT
Start: 2021-06-18 | End: 2021-06-18 | Stop reason: CLARIF

## 2021-06-18 NOTE — TELEPHONE ENCOUNTER
Called pt and informed her that Rx for Labetalol 300 mg BID for #30 with zero refills was sent to pharmacy on file. Pt states understanding.

## 2021-06-18 NOTE — TELEPHONE ENCOUNTER
Current Outpatient Medications   Medication Sig Dispense Refill   • Labetalol HCl 300 MG Oral Tab Take 0.5 tablets (150 mg total) by mouth 2 (two) times daily.  30 tablet 0

## 2021-06-19 ENCOUNTER — ORDERS FOR ADMISSION (OUTPATIENT)
Dept: OBGYN CLINIC | Facility: CLINIC | Age: 31
End: 2021-06-19

## 2021-06-19 ENCOUNTER — APPOINTMENT (OUTPATIENT)
Dept: OBGYN CLINIC | Facility: HOSPITAL | Age: 31
End: 2021-06-19
Payer: COMMERCIAL

## 2021-06-19 ENCOUNTER — ROUTINE PRENATAL (OUTPATIENT)
Dept: OBGYN CLINIC | Facility: CLINIC | Age: 31
End: 2021-06-19
Payer: COMMERCIAL

## 2021-06-19 ENCOUNTER — HOSPITAL ENCOUNTER (OUTPATIENT)
Facility: HOSPITAL | Age: 31
Discharge: HOME | End: 2021-06-19
Attending: OBSTETRICS & GYNECOLOGY | Admitting: OBSTETRICS & GYNECOLOGY
Payer: COMMERCIAL

## 2021-06-19 VITALS
HEART RATE: 87 BPM | WEIGHT: 186.75 LBS | DIASTOLIC BLOOD PRESSURE: 84 MMHG | SYSTOLIC BLOOD PRESSURE: 119 MMHG | BODY MASS INDEX: 29 KG/M2

## 2021-06-19 VITALS — DIASTOLIC BLOOD PRESSURE: 84 MMHG | HEART RATE: 88 BPM | SYSTOLIC BLOOD PRESSURE: 126 MMHG

## 2021-06-19 DIAGNOSIS — O10.919 CHRONIC HYPERTENSION AFFECTING PREGNANCY: ICD-10-CM

## 2021-06-19 DIAGNOSIS — Z34.03 ENCOUNTER FOR SUPERVISION OF NORMAL FIRST PREGNANCY IN THIRD TRIMESTER: Primary | ICD-10-CM

## 2021-06-19 PROCEDURE — 59025 FETAL NON-STRESS TEST: CPT | Performed by: OBSTETRICS & GYNECOLOGY

## 2021-06-19 PROCEDURE — 59025 FETAL NON-STRESS TEST: CPT

## 2021-06-19 PROCEDURE — 3079F DIAST BP 80-89 MM HG: CPT | Performed by: OBSTETRICS & GYNECOLOGY

## 2021-06-19 PROCEDURE — 81002 URINALYSIS NONAUTO W/O SCOPE: CPT | Performed by: OBSTETRICS & GYNECOLOGY

## 2021-06-19 PROCEDURE — 3074F SYST BP LT 130 MM HG: CPT | Performed by: OBSTETRICS & GYNECOLOGY

## 2021-06-19 RX ORDER — DEXTROSE, SODIUM CHLORIDE, SODIUM LACTATE, POTASSIUM CHLORIDE, AND CALCIUM CHLORIDE 5; .6; .31; .03; .02 G/100ML; G/100ML; G/100ML; G/100ML; G/100ML
INJECTION, SOLUTION INTRAVENOUS CONTINUOUS
Status: CANCELLED | OUTPATIENT
Start: 2021-06-19

## 2021-06-19 RX ORDER — ACETAMINOPHEN 500 MG
500 TABLET ORAL EVERY 6 HOURS PRN
Status: CANCELLED | OUTPATIENT
Start: 2021-06-19

## 2021-06-19 RX ORDER — MISOPROSTOL 100 UG/1
25 TABLET ORAL EVERY 4 HOURS
Status: CANCELLED | OUTPATIENT
Start: 2021-06-19 | End: 2021-06-20

## 2021-06-19 RX ORDER — LIDOCAINE HYDROCHLORIDE 10 MG/ML
30 INJECTION, SOLUTION EPIDURAL; INFILTRATION; INTRACAUDAL; PERINEURAL ONCE
Status: CANCELLED | OUTPATIENT
Start: 2021-06-19 | End: 2021-06-19

## 2021-06-19 RX ORDER — ONDANSETRON 2 MG/ML
4 INJECTION INTRAMUSCULAR; INTRAVENOUS EVERY 6 HOURS PRN
Status: CANCELLED | OUTPATIENT
Start: 2021-06-19

## 2021-06-19 RX ORDER — SODIUM CHLORIDE, SODIUM LACTATE, POTASSIUM CHLORIDE, CALCIUM CHLORIDE 600; 310; 30; 20 MG/100ML; MG/100ML; MG/100ML; MG/100ML
INJECTION, SOLUTION INTRAVENOUS CONTINUOUS
Status: CANCELLED | OUTPATIENT
Start: 2021-06-19

## 2021-06-19 RX ORDER — AMMONIA INHALANTS 0.04 G/.3ML
0.3 INHALANT RESPIRATORY (INHALATION) AS NEEDED
Status: CANCELLED | OUTPATIENT
Start: 2021-06-19

## 2021-06-19 RX ORDER — TERBUTALINE SULFATE 1 MG/ML
0.25 INJECTION, SOLUTION SUBCUTANEOUS AS NEEDED
Status: CANCELLED | OUTPATIENT
Start: 2021-06-19

## 2021-06-19 RX ORDER — TRISODIUM CITRATE DIHYDRATE AND CITRIC ACID MONOHYDRATE 500; 334 MG/5ML; MG/5ML
30 SOLUTION ORAL AS NEEDED
Status: CANCELLED | OUTPATIENT
Start: 2021-06-19

## 2021-06-19 RX ORDER — IBUPROFEN 600 MG/1
600 TABLET ORAL EVERY 6 HOURS PRN
Status: CANCELLED | OUTPATIENT
Start: 2021-06-19 | End: 2021-06-21

## 2021-06-19 NOTE — PROGRESS NOTES
Pt is a 27year old female admitted to [facility]. Patient presents with:  Non-stress Test: chronic HTN     Pt is  38w2d intra-uterine pregnancy. History obtained, consents signed. Oriented to room, staff, and plan of care. No

## 2021-06-21 ENCOUNTER — APPOINTMENT (OUTPATIENT)
Dept: OBGYN CLINIC | Facility: HOSPITAL | Age: 31
End: 2021-06-21
Attending: OBSTETRICS & GYNECOLOGY
Payer: COMMERCIAL

## 2021-06-21 ENCOUNTER — HOSPITAL ENCOUNTER (INPATIENT)
Facility: HOSPITAL | Age: 31
LOS: 3 days | Discharge: HOME | End: 2021-06-24
Attending: OBSTETRICS & GYNECOLOGY | Admitting: OBSTETRICS & GYNECOLOGY
Payer: COMMERCIAL

## 2021-06-21 ENCOUNTER — PATIENT MESSAGE (OUTPATIENT)
Dept: OBGYN CLINIC | Facility: CLINIC | Age: 31
End: 2021-06-21

## 2021-06-21 PROCEDURE — 3E0P7VZ INTRODUCTION OF HORMONE INTO FEMALE REPRODUCTIVE, VIA NATURAL OR ARTIFICIAL OPENING: ICD-10-PCS | Performed by: OBSTETRICS & GYNECOLOGY

## 2021-06-21 RX ORDER — HYDROXYZINE HYDROCHLORIDE 50 MG/ML
50 INJECTION, SOLUTION INTRAMUSCULAR ONCE AS NEEDED
Status: ACTIVE | OUTPATIENT
Start: 2021-06-21 | End: 2021-06-21

## 2021-06-21 RX ORDER — ACETAMINOPHEN 500 MG
500 TABLET ORAL EVERY 6 HOURS PRN
Status: DISCONTINUED | OUTPATIENT
Start: 2021-06-21 | End: 2021-06-22

## 2021-06-21 RX ORDER — LIDOCAINE HYDROCHLORIDE 10 MG/ML
30 INJECTION, SOLUTION EPIDURAL; INFILTRATION; INTRACAUDAL; PERINEURAL ONCE
Status: DISCONTINUED | OUTPATIENT
Start: 2021-06-21 | End: 2021-06-22

## 2021-06-21 RX ORDER — HYDROMORPHONE HYDROCHLORIDE 1 MG/ML
1 INJECTION, SOLUTION INTRAMUSCULAR; INTRAVENOUS; SUBCUTANEOUS ONCE
Status: DISCONTINUED | OUTPATIENT
Start: 2021-06-21 | End: 2021-06-22

## 2021-06-21 RX ORDER — AMMONIA INHALANTS 0.04 G/.3ML
0.3 INHALANT RESPIRATORY (INHALATION) AS NEEDED
Status: DISCONTINUED | OUTPATIENT
Start: 2021-06-21 | End: 2021-06-22

## 2021-06-21 RX ORDER — DEXTROSE, SODIUM CHLORIDE, SODIUM LACTATE, POTASSIUM CHLORIDE, AND CALCIUM CHLORIDE 5; .6; .31; .03; .02 G/100ML; G/100ML; G/100ML; G/100ML; G/100ML
INJECTION, SOLUTION INTRAVENOUS CONTINUOUS
Status: DISCONTINUED | OUTPATIENT
Start: 2021-06-21 | End: 2021-06-22

## 2021-06-21 RX ORDER — TRISODIUM CITRATE DIHYDRATE AND CITRIC ACID MONOHYDRATE 500; 334 MG/5ML; MG/5ML
30 SOLUTION ORAL AS NEEDED
Status: DISCONTINUED | OUTPATIENT
Start: 2021-06-21 | End: 2021-06-22

## 2021-06-21 RX ORDER — ONDANSETRON 2 MG/ML
4 INJECTION INTRAMUSCULAR; INTRAVENOUS EVERY 6 HOURS PRN
Status: DISCONTINUED | OUTPATIENT
Start: 2021-06-21 | End: 2021-06-22

## 2021-06-21 RX ORDER — IBUPROFEN 600 MG/1
600 TABLET ORAL EVERY 6 HOURS PRN
Status: DISCONTINUED | OUTPATIENT
Start: 2021-06-21 | End: 2021-06-22

## 2021-06-21 RX ORDER — SODIUM CHLORIDE, SODIUM LACTATE, POTASSIUM CHLORIDE, CALCIUM CHLORIDE 600; 310; 30; 20 MG/100ML; MG/100ML; MG/100ML; MG/100ML
INJECTION, SOLUTION INTRAVENOUS CONTINUOUS
Status: DISCONTINUED | OUTPATIENT
Start: 2021-06-21 | End: 2021-06-22

## 2021-06-21 RX ORDER — TERBUTALINE SULFATE 1 MG/ML
0.25 INJECTION, SOLUTION SUBCUTANEOUS AS NEEDED
Status: DISCONTINUED | OUTPATIENT
Start: 2021-06-21 | End: 2021-06-22

## 2021-06-21 NOTE — TELEPHONE ENCOUNTER
Pt would like to discuss induction date options if there are any, Induction scheduled for tonight at 7pm

## 2021-06-21 NOTE — TELEPHONE ENCOUNTER
To [facility] on-call to please review and advise. Pt is scheduled for IOL tonight. Thank you.

## 2021-06-21 NOTE — TELEPHONE ENCOUNTER
From: Kirt Pascal  To: Adriano Jo MD  Sent: 6/21/2021 7:02 AM CDT  Subject: Non-Urgent Medical Question    Hello,    Here are my pressures for the week. I am taking 300 mg of Labetalol daily and 120mg of aspirin daily.    115/76-6/12

## 2021-06-21 NOTE — TELEPHONE ENCOUNTER
From: Kathleen Johnston  To: Lane Olivas MD  Sent: 6/21/2021 9:17 AM CDT  Subject: Non-Urgent Medical Question    Hello,    I am currently 38 weeks pregnant and 4 days.  I'm supposed to go in this evening for induction due to my chronic hyper

## 2021-06-22 ENCOUNTER — ANESTHESIA (OUTPATIENT)
Dept: OBGYN UNIT | Facility: HOSPITAL | Age: 31
End: 2021-06-22
Payer: COMMERCIAL

## 2021-06-22 ENCOUNTER — ANESTHESIA EVENT (OUTPATIENT)
Dept: OBGYN UNIT | Facility: HOSPITAL | Age: 31
End: 2021-06-22
Payer: COMMERCIAL

## 2021-06-22 PROCEDURE — 0KQM0ZZ REPAIR PERINEUM MUSCLE, OPEN APPROACH: ICD-10-PCS | Performed by: OBSTETRICS & GYNECOLOGY

## 2021-06-22 PROCEDURE — 3E033VJ INTRODUCTION OF OTHER HORMONE INTO PERIPHERAL VEIN, PERCUTANEOUS APPROACH: ICD-10-PCS | Performed by: OBSTETRICS & GYNECOLOGY

## 2021-06-22 PROCEDURE — 59400 OBSTETRICAL CARE: CPT | Performed by: OBSTETRICS & GYNECOLOGY

## 2021-06-22 RX ORDER — SIMETHICONE 80 MG
80 TABLET,CHEWABLE ORAL 3 TIMES DAILY PRN
Status: DISCONTINUED | OUTPATIENT
Start: 2021-06-22 | End: 2021-06-24

## 2021-06-22 RX ORDER — DOCUSATE SODIUM 100 MG/1
100 CAPSULE, LIQUID FILLED ORAL
Status: DISCONTINUED | OUTPATIENT
Start: 2021-06-22 | End: 2021-06-24

## 2021-06-22 RX ORDER — ONDANSETRON 2 MG/ML
4 INJECTION INTRAMUSCULAR; INTRAVENOUS ONCE AS NEEDED
Status: ACTIVE | OUTPATIENT
Start: 2021-06-22 | End: 2021-06-22

## 2021-06-22 RX ORDER — BUPIVACAINE HCL/0.9 % NACL/PF 0.25 %
5 PLASTIC BAG, INJECTION (ML) EPIDURAL AS NEEDED
Status: DISCONTINUED | OUTPATIENT
Start: 2021-06-22 | End: 2021-06-22

## 2021-06-22 RX ORDER — NALBUPHINE HCL 10 MG/ML
2.5 AMPUL (ML) INJECTION
Status: DISCONTINUED | OUTPATIENT
Start: 2021-06-22 | End: 2021-06-22

## 2021-06-22 RX ORDER — DEXTROSE, SODIUM CHLORIDE, SODIUM LACTATE, POTASSIUM CHLORIDE, AND CALCIUM CHLORIDE 5; .6; .31; .03; .02 G/100ML; G/100ML; G/100ML; G/100ML; G/100ML
INJECTION, SOLUTION INTRAVENOUS CONTINUOUS
Status: DISCONTINUED | OUTPATIENT
Start: 2021-06-22 | End: 2021-06-24

## 2021-06-22 RX ORDER — IBUPROFEN 600 MG/1
600 TABLET ORAL EVERY 6 HOURS PRN
Status: DISCONTINUED | OUTPATIENT
Start: 2021-06-22 | End: 2021-06-24

## 2021-06-22 RX ORDER — LIDOCAINE HYDROCHLORIDE 10 MG/ML
INJECTION, SOLUTION EPIDURAL; INFILTRATION; INTRACAUDAL; PERINEURAL AS NEEDED
Status: DISCONTINUED | OUTPATIENT
Start: 2021-06-22 | End: 2021-06-22 | Stop reason: SURG

## 2021-06-22 RX ORDER — LABETALOL 100 MG/1
150 TABLET, FILM COATED ORAL EVERY 12 HOURS SCHEDULED
Status: DISCONTINUED | OUTPATIENT
Start: 2021-06-22 | End: 2021-06-24

## 2021-06-22 RX ORDER — AMMONIA INHALANTS 0.04 G/.3ML
0.3 INHALANT RESPIRATORY (INHALATION) AS NEEDED
Status: DISCONTINUED | OUTPATIENT
Start: 2021-06-22 | End: 2021-06-24

## 2021-06-22 RX ORDER — MISOPROSTOL 200 UG/1
1000 TABLET ORAL ONCE AS NEEDED
Status: ACTIVE | OUTPATIENT
Start: 2021-06-22 | End: 2021-06-22

## 2021-06-22 RX ORDER — BUPIVACAINE HYDROCHLORIDE 2.5 MG/ML
30 INJECTION, SOLUTION EPIDURAL; INFILTRATION; INTRACAUDAL ONCE
Status: COMPLETED | OUTPATIENT
Start: 2021-06-22 | End: 2021-06-22

## 2021-06-22 RX ORDER — DIAPER,BRIEF,INFANT-TODD,DISP
1 EACH MISCELLANEOUS EVERY 6 HOURS PRN
Status: DISCONTINUED | OUTPATIENT
Start: 2021-06-22 | End: 2021-06-24

## 2021-06-22 RX ORDER — CHOLECALCIFEROL (VITAMIN D3) 25 MCG
1 TABLET,CHEWABLE ORAL DAILY
Status: DISCONTINUED | OUTPATIENT
Start: 2021-06-22 | End: 2021-06-24

## 2021-06-22 RX ORDER — LIDOCAINE HYDROCHLORIDE AND EPINEPHRINE 15; 5 MG/ML; UG/ML
INJECTION, SOLUTION EPIDURAL AS NEEDED
Status: DISCONTINUED | OUTPATIENT
Start: 2021-06-22 | End: 2021-06-22 | Stop reason: SURG

## 2021-06-22 RX ORDER — BUPIVACAINE HYDROCHLORIDE 2.5 MG/ML
20 INJECTION, SOLUTION EPIDURAL; INFILTRATION; INTRACAUDAL ONCE
Status: COMPLETED | OUTPATIENT
Start: 2021-06-22 | End: 2021-06-22

## 2021-06-22 RX ORDER — ACETAMINOPHEN 325 MG/1
650 TABLET ORAL EVERY 6 HOURS PRN
Status: DISCONTINUED | OUTPATIENT
Start: 2021-06-22 | End: 2021-06-24

## 2021-06-22 RX ADMIN — BUPIVACAINE HYDROCHLORIDE 5 ML: 2.5 INJECTION, SOLUTION EPIDURAL; INFILTRATION; INTRACAUDAL at 08:15:00

## 2021-06-22 RX ADMIN — BUPIVACAINE HYDROCHLORIDE 3 ML: 2.5 INJECTION, SOLUTION EPIDURAL; INFILTRATION; INTRACAUDAL at 08:20:00

## 2021-06-22 RX ADMIN — LIDOCAINE HYDROCHLORIDE AND EPINEPHRINE 3 ML: 15; 5 INJECTION, SOLUTION EPIDURAL at 06:04:00

## 2021-06-22 RX ADMIN — BUPIVACAINE HYDROCHLORIDE 10 ML: 2.5 INJECTION, SOLUTION EPIDURAL; INFILTRATION; INTRACAUDAL at 06:05:00

## 2021-06-22 RX ADMIN — LIDOCAINE HYDROCHLORIDE 5 ML: 10 INJECTION, SOLUTION EPIDURAL; INFILTRATION; INTRACAUDAL; PERINEURAL at 05:25:00

## 2021-06-22 NOTE — PLAN OF CARE
Problem: ANTEPARTUM/LABOR and DELIVERY  Goal: Maintain pregnancy as long as maternal and/or fetal condition is stable  Description: INTERVENTIONS:  - Maternal surveillance  - Fetal surveillance  - Monitor uterine activity  - Medications as ordered  - Bed Assess pain using appropriate pain scale  - Administer analgesics based on type and severity of pain and evaluate response  - Implement non-pharmacological measures as appropriate and evaluate response  - Consider cultural and social influences on pain and

## 2021-06-22 NOTE — PROGRESS NOTES
[facility]    Vaginal Delivery Note    Kala Coronado Patient Status:  Inpatient    1990 MRN [id number]   Location [address] Att[address] [place] [address], [address] [place] [address] Day # 0 PCP CHAU Peña 6/22/2021  12:03 PM

## 2021-06-22 NOTE — DISCHARGE SUMMARY
[facility]    Discharge Summary    Duane Serve Patient Status:  Inpatient    1990 MRN [id number]   Location [address] Att[address] [place] [address], [address] [place] [address] Day # 0       Admit date:  10

## 2021-06-22 NOTE — LACTATION NOTE
LACTATION NOTE - MOTHER      Evaluation Type: Inpatient    Problems identified  Problems identified: Knowledge deficit    Maternal history  Maternal history: Anemia  Other/comment: Chronic HTN    Breastfeeding goal  Breastfeeding goal: To maintain breast m

## 2021-06-22 NOTE — PROGRESS NOTES
Pt is a 27year old female admitted to [place] [address]. Patient presents with:  Scheduled Induction     Pt is  38w5d intra-uterine pregnancy. History obtained, consents signed. Oriented to room, staff, and plan of care.

## 2021-06-22 NOTE — PAYOR COMM NOTE
--------------  ADMISSION REVIEW     Payor: Alan Chambers [employer]  Subscriber #:  [id number]  Authorization Number: N/A    Admit date: 6/21/21  Admit time:  7:16 PM        History & Physical  Date of Admission:  6/21/2021    HPI:   [place]

## 2021-06-22 NOTE — H&P
[facility] Patient Status:  Outpatient in a Bed    1990 MRN [id number]   Location [address]  Att[address] Melissa Chow, [address]nn Tucker Day # 0 PCP Halyie Hollins Seasonal                  Shellfish-Derived P*    ANAPHYLAXIS  Medications:  Labetalol HCl 300 MG Oral Tab, Take 1 tablet (300 mg total) by mouth 2 (two) times daily. , Disp: 30 tablet, Rfl: 0, 6/21/2021 at 1800  docusate sodium 100 MG Oral Cap

## 2021-06-22 NOTE — PROGRESS NOTES
Attempted to get patient up to bathroom with assist x3. Upon sitting up in bed pt became unresponsive. OB emergency called. Pulse ox and O2 mask placed. Vitals WNL. Straight cath placed and 150 mL of concentrated urine returned. Dr. Amaris Lacey notified.  Bolus

## 2021-06-22 NOTE — ANESTHESIA PROCEDURE NOTES
Labor Analgesia  Performed by: Obdulio Keene MD  Authorized by: Obdulio Keene MD       General Information and Staff    Start Time:  6/22/2021 5:25 AM  End Time:  6/22/2021 6:04 AM  Anesthesiologist:  Obdulio Keene MD  Performed by:

## 2021-06-22 NOTE — ANESTHESIA PREPROCEDURE EVALUATION
Anesthesia PreOp Note    HPI:     Sheyla Herrera is a 27year old female who presents for preoperative consultation requested by: * No surgeons listed *    Date of Surgery: 6/22/2021    * No procedures listed *  Indication: * No pre-op diagno 0227  lactated ringers IV bolus 1,000 mL, 1,000 mL, Intravenous, Once, Yvonne Guerrero MD  fentaNYL 2mcg/ml & bupivacaine 1.25mg/ml epidural infusion, , Epidural, Continuous, Yvonne Guerrero MD  fentaNYL citrate (SUBLIMAZE) 0.05 MG/ML injection SWELLING  Penicillins               Seasonal                  Shellfish-Derived P*    ANAPHYLAXIS    Family History   Problem Relation Age of Onset   • Hypertension Mother    • Stroke Mother    • Breast Cancer Mother 71   • Hypertension Maternal Grandmothe Transportation Needs:       Lack of Transportation (Medical):       Lack of Transportation (Non-Medical):   Physical Activity:       Days of Exercise per Week:       Minutes of Exercise per Session:   Stress:       Feeling of Stress :   Social Connection exam  (+) hypertension,     Neuro/Psych - negative ROS     GI/Hepatic/Renal - negative ROS     Endo/Other - negative ROS   Abdominal                Anesthesia Plan:   ASA:  2  Plan:   Epidural and spinal  Post-op Pain Management: CSE  Plan Comments: I have

## 2021-06-22 NOTE — PLAN OF CARE
Problem: ANTEPARTUM/LABOR and DELIVERY  Goal: Maintain pregnancy as long as maternal and/or fetal condition is stable  Description: INTERVENTIONS:  - Maternal surveillance  - Fetal surveillance  - Monitor uterine activity  - Medications as ordered  - Bed assistance  - Assess pain using appropriate pain scale  - Administer analgesics based on type and severity of pain and evaluate response  - Implement non-pharmacological measures as appropriate and evaluate response  - Consider cultural and social influenc

## 2021-06-22 NOTE — PROGRESS NOTES
Pt is a 27year old female admitted to [place] [address]. Patient presents with:  Scheduled Induction     Pt is  38w5d intra-uterine pregnancy. History obtained, consents signed. Oriented to room, staff, and plan of care.

## 2021-06-22 NOTE — PROGRESS NOTES
Hgb recheck 7.3, pt feeling better and able to ambulate to the bathroom with no incident. Vitals and assessment WNL. Pt transferred to postpartum room 364. Dr. Shaheed Mendosa notified.

## 2021-06-23 PROCEDURE — 30233N1 TRANSFUSION OF NONAUTOLOGOUS RED BLOOD CELLS INTO PERIPHERAL VEIN, PERCUTANEOUS APPROACH: ICD-10-PCS | Performed by: OBSTETRICS & GYNECOLOGY

## 2021-06-23 PROCEDURE — 30233H1 TRANSFUSION OF NONAUTOLOGOUS WHOLE BLOOD INTO PERIPHERAL VEIN, PERCUTANEOUS APPROACH: ICD-10-PCS | Performed by: OBSTETRICS & GYNECOLOGY

## 2021-06-23 RX ORDER — ACETAMINOPHEN 325 MG/1
650 TABLET ORAL ONCE
Status: COMPLETED | OUTPATIENT
Start: 2021-06-23 | End: 2021-06-23

## 2021-06-23 RX ORDER — SODIUM CHLORIDE 9 MG/ML
INJECTION, SOLUTION INTRAVENOUS ONCE
Status: COMPLETED | OUTPATIENT
Start: 2021-06-23 | End: 2021-06-23

## 2021-06-23 RX ORDER — DIPHENHYDRAMINE HCL 25 MG
25 CAPSULE ORAL ONCE
Status: COMPLETED | OUTPATIENT
Start: 2021-06-23 | End: 2021-06-23

## 2021-06-23 NOTE — PROGRESS NOTES
[facility]    OB/Gyne Post  Progress Note      Kirt Pascal Patient Status:  Inpatient    1990 MRN [id number]   Location [facility] 3SE Attending Walt Gaines, [facility] Kaylynn [facility] Day # 2 PCP Arely Beverly,  uL    Monocyte Absolute 1.43 (H) 0.10 - 1.00 x10(3) uL    Eosinophil Absolute 0.19 0.00 - 0.70 x10(3) uL    Basophil Absolute 0.04 0.00 - 0.20 x10(3) uL    Immature Granulocyte Absolute 0.27 0.00 - 1.00 x10(3) uL    Neutrophil % 73.5 %    Lymphocyte % 16. 0 10.7->8.3->7.3 and now 7.0 this morning. Pt was able to ambulate w/o assistance in the hallway this morning but underwent orthostatic BPs and HR inc to 140s upon standing. We had to hold her BP meds due to low BP and HR 100s last night.   Pt has been relu

## 2021-06-23 NOTE — LACTATION NOTE
This note was copied from a baby's chart.   LACTATION NOTE - INFANT         Problems & Assessment  Infant Assessment: Hunger cues present;Skin color: pink or appropriate for ethnicity  Muscle tone: Appropriate for GA    Feeding Assessment  Summary Current F

## 2021-06-23 NOTE — ANESTHESIA POSTPROCEDURE EVALUATION
Patient: James Turner    Procedure Summary     Date: 06/22/21 Room / Location:     Anesthesia Start: 0527 Anesthesia Stop: 0775    Procedure: LABOR ANALGESIA Diagnosis:     Scheduled Providers:  Anesthesiologist: Chilo Cramer DO

## 2021-06-23 NOTE — PROGRESS NOTES
Patient was up in the colon ambulating with standby assistance this am. States she is feeling well today. One unit of PRBC's infusing without any difficulty. Patient has no complaints.  at bedside. Infant rooming in. See flowsheets.

## 2021-06-24 ENCOUNTER — TELEPHONE (OUTPATIENT)
Dept: OBGYN CLINIC | Facility: CLINIC | Age: 31
End: 2021-06-24

## 2021-06-24 VITALS
RESPIRATION RATE: 18 BRPM | DIASTOLIC BLOOD PRESSURE: 90 MMHG | HEIGHT: 67 IN | OXYGEN SATURATION: 100 % | BODY MASS INDEX: 29.31 KG/M2 | WEIGHT: 186.75 LBS | TEMPERATURE: 99 F | SYSTOLIC BLOOD PRESSURE: 137 MMHG | HEART RATE: 91 BPM

## 2021-06-24 RX ORDER — FERROUS SULFATE 137(45) MG
142 TABLET, EXTENDED RELEASE ORAL DAILY
Qty: 60 TABLET | Refills: 0 | Status: SHIPPED | OUTPATIENT
Start: 2021-06-24 | End: 2021-10-23

## 2021-06-24 RX ORDER — LABETALOL 300 MG/1
150 TABLET, FILM COATED ORAL EVERY 12 HOURS SCHEDULED
Qty: 60 TABLET | Refills: 1 | Status: SHIPPED | OUTPATIENT
Start: 2021-06-24 | End: 2021-08-19 | Stop reason: ALTCHOICE

## 2021-06-24 RX ORDER — SODIUM CHLORIDE 9 MG/ML
INJECTION, SOLUTION INTRAVENOUS ONCE
Status: COMPLETED | OUTPATIENT
Start: 2021-06-24 | End: 2021-06-24

## 2021-06-24 NOTE — PROGRESS NOTES
[facility]    OB/Gyne Post  Progress Note      Namratajc Jamil Patient Status:  Inpatient    1990 MRN [id number]   Location [facility] [address]SE Attending Rebecca Beckford, [address] Day # 3 PCP Grecia Page DO Basophil % 0.3 %    Immature Granulocyte % 2.7 %   Prepare RBC Once    Collection Time: 06/24/21 12:40 AM   Result Value Ref Range    Blood Product [id number]     Unit Number [id number]     UNIT ABO A     UNIT RH NEG     Product Status Presumed Trans

## 2021-06-24 NOTE — PLAN OF CARE
Problem: Patient Centered Care  Goal: Patient preferences are identified and integrated in the patient's plan of care  Description: Interventions:  - What would you like us to know as we care for you?   - Provide timely, complete, and accurate informatio strengthening/mobility  - Encourage toileting schedule  Outcome: Completed     Problem: POSTPARTUM  Goal: Long Term Goal:Experiences normal postpartum course  Description: INTERVENTIONS:  - Assess and monitor vital signs and lab values.   - Assess fundus an incompatible with breast feeding.  - Assess and monitor for signs of nipple pain/trauma. - Instruct and provide assistance with proper latch. - Review techniques for milk expression (breast pumping) and storage of breast milk.  Provide pumping equipment/s

## 2021-06-24 NOTE — TELEPHONE ENCOUNTER
Denita, bedside scheduling calling to requests 6 week post partum appointment. Please call patient directly at ,[facility].

## 2021-06-28 ENCOUNTER — TELEPHONE (OUTPATIENT)
Dept: LACTATION | Facility: HOSPITAL | Age: 31
End: 2021-06-28

## 2021-06-28 ENCOUNTER — TELEPHONE (OUTPATIENT)
Dept: OBGYN CLINIC | Facility: CLINIC | Age: 31
End: 2021-06-28

## 2021-06-28 NOTE — TELEPHONE ENCOUNTER
Patient called in. Feels her milk is in and feeling engorged. Discussed when she should start pumping. Recommended to do heat and massage before a feeding and ice after. Talked about waiting to start pumping until baby is 4 weeks old.   Discussed freque

## 2021-06-28 NOTE — TELEPHONE ENCOUNTER
Pt had  with SINDHU on . Pt with chronic HTN and was on labetalol and baby aspirin during the pregnancy. Pt stated that she is currently on Labetalol 150mg BID.  Pt stated that she typically takes her AM dose of labetalol between 5-7am, and her PM dose

## 2021-06-28 NOTE — TELEPHONE ENCOUNTER
Spoke with SINDHU. SINDHU stated that since pt is a chronic HTN, she needs to call if BPs are consistently >155/>100. Pt informed of recs and verbalized understanding.

## 2021-06-29 ENCOUNTER — TELEPHONE (OUTPATIENT)
Dept: OBGYN CLINIC | Facility: CLINIC | Age: 31
End: 2021-06-29

## 2021-06-29 NOTE — TELEPHONE ENCOUNTER
Patient has scheduled her post partum visit for 7/20. This will be 5 weeks instead of 6. It is the only appointment for Dr Guido Miles. She would like to know if this is ok. She would also like to know what to expect at this appointment.      Patient is al

## 2021-06-29 NOTE — TELEPHONE ENCOUNTER
Informed pt that Barry Reece stated that he agrees with the triage given to him. Informed pt to call us if she has any headaches, blurry vision or RUQ pain. Informed pt to let us know if BPs are >155>199,      Pt states that she sometimes gets mild headaches.

## 2021-06-29 NOTE — TELEPHONE ENCOUNTER
Pt rescheduled PP appt with SINDHU to 8/4. This will be during pts 6 week clarissa. Pt again calling with questions about her BPs. See TE from 6/28/21. Pt stated that she is on Labetalol 150mg BID and is wondering if her Labetalol needs to be increased.  Pt st

## 2021-06-30 NOTE — TELEPHONE ENCOUNTER
Pt transferred from answering service. Pt calling with questions about her BP again. See messages below. Pt stated she is taking Labetalol 150mg BID.  Pt stated she took her evening dose of Labetalol and checked her BP 20 minutes later and her first BP read

## 2021-07-01 NOTE — TELEPHONE ENCOUNTER
I called and spoke with Jocy Clarke. I recognize that these are pressures she is not used to in and before pregnancy. I asked her to assess BP about 2 hours after each dose, not just right after. This would allow a more accurate reading with med on board.  She'll

## 2021-07-09 ENCOUNTER — TELEPHONE (OUTPATIENT)
Dept: OBGYN UNIT | Facility: HOSPITAL | Age: 31
End: 2021-07-09

## 2021-07-09 ENCOUNTER — NST DOCUMENTATION (OUTPATIENT)
Dept: OBGYN CLINIC | Facility: CLINIC | Age: 31
End: 2021-07-09

## 2021-07-09 NOTE — NST
Nonstress Test   Patient: Waldo Thomas    Gestation: 66G5Y    Diagnosis from order: Chronic hypertension affecting pregnancy       NST:         NST DOCUMENTATION 6/19/2021   Variability 6-25 BPM   Accelerations Yes   Decelerations None   Ba

## 2021-07-10 ENCOUNTER — PATIENT MESSAGE (OUTPATIENT)
Dept: OBGYN CLINIC | Facility: CLINIC | Age: 31
End: 2021-07-10

## 2021-07-12 NOTE — TELEPHONE ENCOUNTER
From: [facility]  To: Adryan Dickson. DO Sanjuana  Sent: 7/10/2021 6:07 PM CDT  Subject: Non-Urgent Medical Question    Hello,    Here are my blood pressures for this week. I'm taking 300 mg of Labetalol daily.     120/77-7/3  137/98-7/4  134/93-

## 2021-07-13 ENCOUNTER — TELEPHONE (OUTPATIENT)
Dept: OBGYN CLINIC | Facility: CLINIC | Age: 31
End: 2021-07-13

## 2021-07-13 NOTE — TELEPHONE ENCOUNTER
Called and phone rang then it disconnected. PSR when pt calls back please notify that the PP exam is done at 6 weeks at the earliest with the doctor that delivered. Pt's appt on 8/4 is at 6w and 1 day post partum.  The soonest would be 8/3 and there are no

## 2021-07-13 NOTE — TELEPHONE ENCOUNTER
pt. wants to know if she is able to be added to Dr Baltazar fair for post partum check sooner then 8/3/2021? 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 pts delivery date was 6/22/2021.

## 2021-07-17 ENCOUNTER — PATIENT MESSAGE (OUTPATIENT)
Dept: OBGYN CLINIC | Facility: CLINIC | Age: 31
End: 2021-07-17

## 2021-07-19 NOTE — TELEPHONE ENCOUNTER
From: Loraine Soto  To: Carlos Chance. DO Sanjuana  Sent: 7/17/2021 8:27 PM CDT  Subject: Non-Urgent Medical Question    Hello,    Here are my blood pressures. I'm taking 300 mg of Labetalol daily.     133/92-7/10  134/92-7/11  138/94-7/12  134/96

## 2021-08-04 ENCOUNTER — POSTPARTUM (OUTPATIENT)
Dept: OBGYN CLINIC | Facility: CLINIC | Age: 31
End: 2021-08-04
Payer: MEDICAID

## 2021-08-04 VITALS
DIASTOLIC BLOOD PRESSURE: 82 MMHG | HEART RATE: 80 BPM | WEIGHT: 166.38 LBS | SYSTOLIC BLOOD PRESSURE: 138 MMHG | BODY MASS INDEX: 26 KG/M2

## 2021-08-04 PROBLEM — O10.919 CHRONIC HYPERTENSION AFFECTING PREGNANCY: Status: RESOLVED | Noted: 2020-11-25 | Resolved: 2021-08-04

## 2021-08-04 PROBLEM — O99.012 ANEMIA DURING PREGNANCY IN SECOND TRIMESTER: Status: RESOLVED | Noted: 2021-03-12 | Resolved: 2021-08-04

## 2021-08-04 PROBLEM — O99.012 ANEMIA DURING PREGNANCY IN SECOND TRIMESTER (HCC): Status: RESOLVED | Noted: 2021-03-12 | Resolved: 2021-08-04

## 2021-08-04 PROBLEM — O10.919 CHRONIC HYPERTENSION AFFECTING PREGNANCY (HCC): Status: RESOLVED | Noted: 2020-11-25 | Resolved: 2021-08-04

## 2021-08-04 PROCEDURE — 3079F DIAST BP 80-89 MM HG: CPT | Performed by: OBSTETRICS & GYNECOLOGY

## 2021-08-04 PROCEDURE — 3075F SYST BP GE 130 - 139MM HG: CPT | Performed by: OBSTETRICS & GYNECOLOGY

## 2021-08-04 PROCEDURE — 0503F POSTPARTUM CARE VISIT: CPT | Performed by: OBSTETRICS & GYNECOLOGY

## 2021-08-09 NOTE — PROGRESS NOTES
ANGE    Leanna Vilchis is a 27year old female  here for 6 week post-partum visit. Patient delivered a  female infant on 05-00-81Lgu[facility]x [facility]le ). Patient desires condom. for contraception.   Patient is breast & bottle feeding planned 6 months perineum  Anus: no hemorroids       ASSESSMENT/PLAN  Discussed all options of birthcontrol including ocps, minipill, Mirena or Paragard IUD, nuvaring, orthoevra patch, nexplanon, Depoprovera, condoms or tubal sterilization options.  Patient has chosen [facility]

## 2021-08-13 ENCOUNTER — PATIENT MESSAGE (OUTPATIENT)
Dept: FAMILY MEDICINE CLINIC | Facility: CLINIC | Age: 31
End: 2021-08-13

## 2021-08-13 ENCOUNTER — PATIENT MESSAGE (OUTPATIENT)
Dept: OBGYN CLINIC | Facility: CLINIC | Age: 31
End: 2021-08-13

## 2021-08-13 NOTE — TELEPHONE ENCOUNTER
Sent to SINDHU for recs regarding her blood pressures. Have pt follow up with pcp with blood pressure levels? Pt states that she takes Labetalol 150mg bid .

## 2021-08-16 NOTE — TELEPHONE ENCOUNTER
RN contacted pt. Pt reports needing to be seen this week. Pt delivered almost 2 months ago and bps are still not under control. Pt on 300 mg daily of labetalol, current bps 130s-140's/90s. Pt given sda with ALS for 8/19.  RN provided instructions to call ba advise me as to what to do.     Thanks,  Valeriano Guerrero

## 2021-08-19 ENCOUNTER — LAB ENCOUNTER (OUTPATIENT)
Dept: LAB | Facility: REFERENCE LAB | Age: 31
End: 2021-08-19
Attending: FAMILY MEDICINE
Payer: MEDICAID

## 2021-08-19 ENCOUNTER — OFFICE VISIT (OUTPATIENT)
Dept: FAMILY MEDICINE CLINIC | Facility: CLINIC | Age: 31
End: 2021-08-19
Payer: MEDICAID

## 2021-08-19 VITALS
BODY MASS INDEX: 26.37 KG/M2 | DIASTOLIC BLOOD PRESSURE: 94 MMHG | HEIGHT: 67 IN | WEIGHT: 168 LBS | OXYGEN SATURATION: 100 % | HEART RATE: 68 BPM | SYSTOLIC BLOOD PRESSURE: 130 MMHG

## 2021-08-19 DIAGNOSIS — D50.0 IRON DEFICIENCY ANEMIA DUE TO CHRONIC BLOOD LOSS: ICD-10-CM

## 2021-08-19 DIAGNOSIS — I10 HYPERTENSION WITH GOAL BLOOD PRESSURE LESS THAN 140/90: Primary | ICD-10-CM

## 2021-08-19 LAB
ANION GAP SERPL CALC-SCNC: 2 MMOL/L (ref 0–18)
BASOPHILS # BLD AUTO: 0.04 X10(3) UL (ref 0–0.2)
BASOPHILS NFR BLD AUTO: 0.6 %
BUN BLD-MCNC: 16 MG/DL (ref 7–18)
BUN/CREAT SERPL: 21.3 (ref 10–20)
CALCIUM BLD-MCNC: 9.8 MG/DL (ref 8.5–10.1)
CHLORIDE SERPL-SCNC: 105 MMOL/L (ref 98–112)
CO2 SERPL-SCNC: 31 MMOL/L (ref 21–32)
CREAT BLD-MCNC: 0.75 MG/DL
DEPRECATED RDW RBC AUTO: 41.2 FL (ref 35.1–46.3)
EOSINOPHIL # BLD AUTO: 0.24 X10(3) UL (ref 0–0.7)
EOSINOPHIL NFR BLD AUTO: 3.7 %
ERYTHROCYTE [DISTWIDTH] IN BLOOD BY AUTOMATED COUNT: 12.7 % (ref 11–15)
GLUCOSE BLD-MCNC: 85 MG/DL (ref 70–99)
HCT VFR BLD AUTO: 37.9 %
HGB BLD-MCNC: 11.8 G/DL
IMM GRANULOCYTES # BLD AUTO: 0.01 X10(3) UL (ref 0–1)
IMM GRANULOCYTES NFR BLD: 0.2 %
LYMPHOCYTES # BLD AUTO: 2.25 X10(3) UL (ref 1–4)
LYMPHOCYTES NFR BLD AUTO: 34.2 %
MCH RBC QN AUTO: 27.8 PG (ref 26–34)
MCHC RBC AUTO-ENTMCNC: 31.1 G/DL (ref 31–37)
MCV RBC AUTO: 89.2 FL
MONOCYTES # BLD AUTO: 0.67 X10(3) UL (ref 0.1–1)
MONOCYTES NFR BLD AUTO: 10.2 %
NEUTROPHILS # BLD AUTO: 3.36 X10 (3) UL (ref 1.5–7.7)
NEUTROPHILS # BLD AUTO: 3.36 X10(3) UL (ref 1.5–7.7)
NEUTROPHILS NFR BLD AUTO: 51.1 %
OSMOLALITY SERPL CALC.SUM OF ELEC: 286 MOSM/KG (ref 275–295)
PATIENT FASTING Y/N/NP: NO
PLATELET # BLD AUTO: 271 10(3)UL (ref 150–450)
POTASSIUM SERPL-SCNC: 4 MMOL/L (ref 3.5–5.1)
RBC # BLD AUTO: 4.25 X10(6)UL
SODIUM SERPL-SCNC: 138 MMOL/L (ref 136–145)
WBC # BLD AUTO: 6.6 X10(3) UL (ref 4–11)

## 2021-08-19 PROCEDURE — 80048 BASIC METABOLIC PNL TOTAL CA: CPT

## 2021-08-19 PROCEDURE — 36415 COLL VENOUS BLD VENIPUNCTURE: CPT

## 2021-08-19 PROCEDURE — 99213 OFFICE O/P EST LOW 20 MIN: CPT | Performed by: FAMILY MEDICINE

## 2021-08-19 PROCEDURE — 3008F BODY MASS INDEX DOCD: CPT | Performed by: FAMILY MEDICINE

## 2021-08-19 PROCEDURE — 85025 COMPLETE CBC W/AUTO DIFF WBC: CPT

## 2021-08-19 PROCEDURE — 3075F SYST BP GE 130 - 139MM HG: CPT | Performed by: FAMILY MEDICINE

## 2021-08-19 PROCEDURE — 3080F DIAST BP >= 90 MM HG: CPT | Performed by: FAMILY MEDICINE

## 2021-08-19 RX ORDER — NIFEDIPINE 30 MG/1
30 TABLET, FILM COATED, EXTENDED RELEASE ORAL DAILY
Qty: 90 TABLET | Refills: 0 | Status: SHIPPED | OUTPATIENT
Start: 2021-08-19 | End: 2021-09-27

## 2021-08-19 NOTE — PROGRESS NOTES
CC:  Patient presents with:  Blood Pressure: OBGYN told her to follow up with PCP for high blood pressure      HPI: 27year old postpartum female here to follow-up on hypertension.   Reports her blood pressure was well controlled throughout the entire pregn Social History Narrative      Thony Corbett is  x 4 yrs to [place]. She has no children. Patient works as a .  She and her  live in [place], [place]    Social Determinants of Health  Financial Resource Strain:       Difficulty of Payin RRR, no murmurs, S1, S2  Pulmonary:  Clear bilaterally, good air entry  GI:  Soft, positive bowel sounds, non-tender, no masses, no guarding, no rebound, no abdominal bruits  Dermatologic:  No rashes or lesions  Ext: no cyanosis or edema    Assessment and

## 2021-09-04 ENCOUNTER — TELEPHONE (OUTPATIENT)
Dept: OBGYN CLINIC | Facility: CLINIC | Age: 31
End: 2021-09-04

## 2021-09-04 NOTE — TELEPHONE ENCOUNTER
Pt delivered on 21 by . Pt is currently breastfeeding and not using any birth control pills. Pt states she was intimate with her partner on . On  she started having a light flow of dark blood. Pt denies any cramping or pain. Pt informed it could be a period. Pt advised to monitor and call if bleeding increases to soaking a pad in an hour or less or if she starts experiencing severe cramping. Pt informed message will also be sent to SINDHU for any other recs.

## 2021-09-27 RX ORDER — NIFEDIPINE 30 MG/1
60 TABLET, FILM COATED, EXTENDED RELEASE ORAL DAILY
Qty: 180 TABLET | Refills: 0 | Status: SHIPPED | OUTPATIENT
Start: 2021-09-27 | End: 2021-11-04

## 2021-09-27 NOTE — TELEPHONE ENCOUNTER
A refill request was received for:  Requested Prescriptions     Pending Prescriptions Disp Refills   • NIFEdipine 30 MG Oral Tablet 24 Hr 90 tablet 0     Sig: Take 2 tablets (60 mg total) by mouth daily.      Last refill date: 08/19/2021  Qty: 30  Last offi

## 2021-10-21 ENCOUNTER — TELEPHONE (OUTPATIENT)
Dept: FAMILY MEDICINE CLINIC | Facility: CLINIC | Age: 31
End: 2021-10-21

## 2021-10-21 NOTE — TELEPHONE ENCOUNTER
Patient is breast feeding and wanted to know if she could take the Covid Booster and the Flu shot at the same time.

## 2021-10-23 ENCOUNTER — OFFICE VISIT (OUTPATIENT)
Dept: FAMILY MEDICINE CLINIC | Facility: CLINIC | Age: 31
End: 2021-10-23
Payer: MEDICAID

## 2021-10-23 VITALS
WEIGHT: 159 LBS | HEIGHT: 67 IN | BODY MASS INDEX: 24.96 KG/M2 | SYSTOLIC BLOOD PRESSURE: 128 MMHG | DIASTOLIC BLOOD PRESSURE: 88 MMHG | HEART RATE: 72 BPM | OXYGEN SATURATION: 99 %

## 2021-10-23 DIAGNOSIS — Z00.00 ENCOUNTER FOR ROUTINE ADULT HEALTH EXAMINATION WITHOUT ABNORMAL FINDINGS: Primary | ICD-10-CM

## 2021-10-23 DIAGNOSIS — Z12.4 PAP SMEAR FOR CERVICAL CANCER SCREENING: ICD-10-CM

## 2021-10-23 DIAGNOSIS — Z23 NEED FOR VACCINATION: ICD-10-CM

## 2021-10-23 DIAGNOSIS — I10 HYPERTENSION WITH GOAL BLOOD PRESSURE LESS THAN 130/80: ICD-10-CM

## 2021-10-23 PROCEDURE — 87624 HPV HI-RISK TYP POOLED RSLT: CPT | Performed by: FAMILY MEDICINE

## 2021-10-23 PROCEDURE — 3008F BODY MASS INDEX DOCD: CPT | Performed by: FAMILY MEDICINE

## 2021-10-23 PROCEDURE — 3074F SYST BP LT 130 MM HG: CPT | Performed by: FAMILY MEDICINE

## 2021-10-23 PROCEDURE — 99395 PREV VISIT EST AGE 18-39: CPT | Performed by: FAMILY MEDICINE

## 2021-10-23 PROCEDURE — 3079F DIAST BP 80-89 MM HG: CPT | Performed by: FAMILY MEDICINE

## 2021-10-23 PROCEDURE — 90471 IMMUNIZATION ADMIN: CPT | Performed by: FAMILY MEDICINE

## 2021-10-23 PROCEDURE — 88175 CYTOPATH C/V AUTO FLUID REDO: CPT | Performed by: FAMILY MEDICINE

## 2021-10-23 PROCEDURE — 90686 IIV4 VACC NO PRSV 0.5 ML IM: CPT | Performed by: FAMILY MEDICINE

## 2021-10-23 NOTE — PROGRESS NOTES
HPI:   Alberto Vazquez is a 27year old female who presents for a complete physical exam.     Daughter is 4 months now and things are going well. She is still breastfeeding.    Blood pressure has been 120/80's on average when she checks it at h Past Surgical History:   Procedure Laterality Date   • EACH ADD TOOTH EXTRACTION  2009    no associated bleeding complications      Family History   Problem Relation Age of Onset   • Hypertension Mother    • Stroke Mother    • Breast Cancer Mother 71   • introitus is normal, scant discharge, cervix is pink without abnormalities   EXTREMITIES: no edema    Cholesterol, Total (mg/dL)   Date Value   10/17/2018 155     HDL Cholesterol (mg/dL)   Date Value   10/17/2018 49     LDL Cholesterol (mg/dL)   Date Value between the ages of 25 and 78: Check in the future       All questions were answered during the visit and the patient verbalizes understanding. She will return in 6 months for follow-up, one year for next [address] or sooner as needed.     Meds & Refills for

## 2021-10-26 ENCOUNTER — HOSPITAL ENCOUNTER (OUTPATIENT)
Age: 31
Discharge: HOME | End: 2021-10-26
Payer: MEDICAID

## 2021-10-26 VITALS
SYSTOLIC BLOOD PRESSURE: 130 MMHG | HEART RATE: 74 BPM | DIASTOLIC BLOOD PRESSURE: 95 MMHG | RESPIRATION RATE: 18 BRPM | BODY MASS INDEX: 25 KG/M2 | WEIGHT: 158 LBS | OXYGEN SATURATION: 100 % | TEMPERATURE: 98 F

## 2021-10-26 DIAGNOSIS — L50.9 URTICARIA: Primary | ICD-10-CM

## 2021-10-26 PROCEDURE — 99213 OFFICE O/P EST LOW 20 MIN: CPT | Performed by: PHYSICIAN ASSISTANT

## 2021-10-26 RX ORDER — DIPHENHYDRAMINE HCL 25 MG
25 CAPSULE ORAL ONCE
Status: COMPLETED | OUTPATIENT
Start: 2021-10-26 | End: 2021-10-26

## 2021-10-26 RX ORDER — PREDNISONE 20 MG/1
40 TABLET ORAL DAILY
Qty: 10 TABLET | Refills: 0 | Status: SHIPPED | OUTPATIENT
Start: 2021-10-26 | End: 2021-10-31

## 2021-10-26 NOTE — ED PROVIDER NOTES
Patient Seen in: [facility]      History   Patient presents with:  Rash Skin Problem    Stated Complaint: rash / hives on entire body    Subjective:   HPI    70-year-old female with past medical history of hypertension here for evaluation of Right Ear: External ear normal.      Left Ear: External ear normal.      Nose: Nose normal.      Mouth/Throat:      Mouth: Mucous membranes are moist.   Eyes:      Extraocular Movements: Extraocular movements intact.       Pupils: Pupils are equal, round, a

## 2021-10-26 NOTE — ED INITIAL ASSESSMENT (HPI)
Pt presents with rash on body including face. Pt states happened overnight. Has not started any new medications, unsure of possible new detergents at home. Ate at a new restaurant 2 days ago, does have a shellfish allergy.    Has not taken any antihistamine

## 2021-10-27 ENCOUNTER — TELEPHONE (OUTPATIENT)
Dept: FAMILY MEDICINE CLINIC | Facility: CLINIC | Age: 31
End: 2021-10-27

## 2021-10-27 NOTE — TELEPHONE ENCOUNTER
Please ensure she starts Prednisone ASAP and continues Benadryl every 6 hours for the next 24 hours as long as not making her too drowsy.  Unlikely caused by Influenza vaccine unless it occurred within hours of receiving the shot, especially since she never

## 2021-10-27 NOTE — TELEPHONE ENCOUNTER
Called pt and c/o hives not getting worse, hives everywhere, hives better around the arms, feels feverish but per pt could be from scratching. Pt is taking Benadryl.   Noted BP elevated but pt did not have a chance to take BP med in the pm.  Also  w

## 2021-10-27 NOTE — TELEPHONE ENCOUNTER
Called pt and provided Dr. Ruthy Smyth instructions. Pt verbalized understanding.   Per pt did try new restaurant

## 2021-10-27 NOTE — TELEPHONE ENCOUNTER
Pt states she began breaking out into hives starting Monday and into Tuesday. She reported to an Immediate care where they prescribed  Benadryl and prednisone.     They recommended she follow up with someone because she may have had adverse reaction to flu

## 2021-10-28 NOTE — TELEPHONE ENCOUNTER
Okay to take Zyrtec 10 mg daily as non-drowsy and also would take Benadryl if hives persisting. Would also add over the counter Pepcid 20 mg twice daily as it is an anti-histamine.  If not fully resolved after finishing steroid will need to see allergist.

## 2021-10-28 NOTE — TELEPHONE ENCOUNTER
Pt called went to work and mild flare up (hives restarted). Before going to work hives gone but at 11:30 am (started work at 0700) mild hives started. Hives not as bad as before and continues with prednisone.

## 2021-10-28 NOTE — TELEPHONE ENCOUNTER
The patient was calling with a follow-up. The patient is doing better but she had a mild fair up at work today.

## 2021-11-01 NOTE — TELEPHONE ENCOUNTER
The patient is doing better but still has the sensation that she is itching. It has improved but not completely gone. She wanted to speak to someone about it.

## 2021-11-01 NOTE — TELEPHONE ENCOUNTER
Called pt and feeling better but still itching. Provided pt SDA for 11/04/21 and verbalized understanding.

## 2021-11-04 ENCOUNTER — OFFICE VISIT (OUTPATIENT)
Dept: FAMILY MEDICINE CLINIC | Facility: CLINIC | Age: 31
End: 2021-11-04
Payer: MEDICAID

## 2021-11-04 VITALS
BODY MASS INDEX: 25.27 KG/M2 | SYSTOLIC BLOOD PRESSURE: 136 MMHG | DIASTOLIC BLOOD PRESSURE: 88 MMHG | OXYGEN SATURATION: 98 % | HEIGHT: 67 IN | HEART RATE: 73 BPM | WEIGHT: 161 LBS

## 2021-11-04 DIAGNOSIS — I10 HYPERTENSION WITH GOAL BLOOD PRESSURE LESS THAN 130/80: ICD-10-CM

## 2021-11-04 DIAGNOSIS — L50.9 URTICARIA: Primary | ICD-10-CM

## 2021-11-04 PROCEDURE — 99213 OFFICE O/P EST LOW 20 MIN: CPT | Performed by: FAMILY MEDICINE

## 2021-11-04 PROCEDURE — 3075F SYST BP GE 130 - 139MM HG: CPT | Performed by: FAMILY MEDICINE

## 2021-11-04 PROCEDURE — 3008F BODY MASS INDEX DOCD: CPT | Performed by: FAMILY MEDICINE

## 2021-11-04 PROCEDURE — 3079F DIAST BP 80-89 MM HG: CPT | Performed by: FAMILY MEDICINE

## 2021-11-04 RX ORDER — NIFEDIPINE 60 MG/1
60 TABLET, FILM COATED, EXTENDED RELEASE ORAL DAILY
Qty: 90 TABLET | Refills: 1 | Status: SHIPPED | OUTPATIENT
Start: 2021-11-04

## 2021-11-04 RX ORDER — EPINEPHRINE 0.3 MG/.3ML
0.3 INJECTION SUBCUTANEOUS ONCE
Qty: 1 EACH | Refills: 0 | Status: SHIPPED | OUTPATIENT
Start: 2021-11-04 | End: 2021-11-04

## 2021-11-04 NOTE — PROGRESS NOTES
CC:  Patient presents with:  Itching: had some hives but not anymore      HPI: 27year old female here to follow-up on urticaria. Developed hives suddenly on 10/25 from her face all the way to her feet.   Not sure what caused it and went to the immediate c [place]    Social Determinants of Health  Financial Resource Strain:       Difficulty of Paying Living Expenses: Not on file  Food Insecurity:       Worried About [address] in the Last Year: Not on file      Ran Out of Food in the Last Year: appropriate, no acute distress   HEENT: supple, no lymphadenopathy   Cardio:  RRR, no murmurs, S1, S2  Pulmonary:  Clear bilaterally, good air entry  Dermatologic:  No rashes or lesions    Assessment and Plan: 27year old female here to follow-up on urtica

## 2021-11-11 ENCOUNTER — TELEPHONE (OUTPATIENT)
Dept: FAMILY MEDICINE CLINIC | Facility: CLINIC | Age: 31
End: 2021-11-11

## 2021-11-11 NOTE — TELEPHONE ENCOUNTER
Agree with recommendations and most OTC cough medicines are okay for breastfeeding except for ones with decongestants.  Can try Delsym or Mucinex and also agree with holding off on booster until symptoms subside and take at home Covid test if symptoms persi

## 2021-11-11 NOTE — TELEPHONE ENCOUNTER
Pt reports nasal congestion, post nasal drip, cough. Pt denies covid exposure. Advised pt to push fluids, take OTC meds for cold discuss with pharmD. Advised pt to wait with booster vaccine until pt feels better.  Pt to call back and/or go to IC with worsen

## 2021-12-29 ENCOUNTER — TELEPHONE (OUTPATIENT)
Dept: FAMILY MEDICINE CLINIC | Facility: CLINIC | Age: 31
End: 2021-12-29

## 2021-12-29 NOTE — TELEPHONE ENCOUNTER
The patient is requesting a copy of her most recent physical. She said that she saw the summary in my-chart but she wanted to know if it was something more official she could have for her new job.

## 2021-12-30 ENCOUNTER — TELEPHONE (OUTPATIENT)
Dept: SCHEDULING | Age: 31
End: 2021-12-30

## 2022-01-02 ENCOUNTER — WALK IN (OUTPATIENT)
Dept: URGENT CARE | Age: 32
End: 2022-01-02

## 2022-01-02 DIAGNOSIS — Z11.1 SCREENING-PULMONARY TB: Primary | ICD-10-CM

## 2022-01-02 PROCEDURE — 86580 TB INTRADERMAL TEST: CPT | Performed by: NURSE PRACTITIONER

## 2022-01-04 ENCOUNTER — WALK IN (OUTPATIENT)
Dept: URGENT CARE | Age: 32
End: 2022-01-04

## 2022-01-04 DIAGNOSIS — Z11.1 ENCOUNTER FOR PPD SKIN TEST READING: Primary | ICD-10-CM

## 2022-01-04 LAB
INDURATION: NORMAL MM (ref 0–?)
SKIN TEST RESULT: NEGATIVE

## 2022-01-04 PROCEDURE — X1094 NO CHARGE VISIT: HCPCS | Performed by: NURSE PRACTITIONER

## 2022-06-20 ENCOUNTER — TELEPHONE (OUTPATIENT)
Dept: FAMILY MEDICINE CLINIC | Facility: CLINIC | Age: 32
End: 2022-06-20

## 2022-06-20 RX ORDER — NIFEDIPINE 60 MG/1
TABLET, FILM COATED, EXTENDED RELEASE ORAL
Qty: 90 TABLET | Refills: 1 | Status: SHIPPED | OUTPATIENT
Start: 2022-06-20

## 2022-06-20 NOTE — TELEPHONE ENCOUNTER
----- Message from Toi Martinez RN sent at 6/20/2022 12:49 PM CDT -----  Regarding: FW: Nifedipine refill      ----- Message -----  From: Nasrin Edward  Sent: 6/20/2022   8:14 AM CDT  To: [facility] Rn Triage  Subject: Nifedipine refill                                Hello,    My pharmacy did not give me a refill for my nifedipine, so I am reaching out to request that it be refilled. Please contact me back in this regard.     Thanks,  Leslie Smith

## 2022-07-23 ENCOUNTER — WALK IN (OUTPATIENT)
Dept: URGENT CARE | Age: 32
End: 2022-07-23

## 2022-07-23 DIAGNOSIS — Z11.1 SCREENING-PULMONARY TB: Primary | ICD-10-CM

## 2022-07-23 PROCEDURE — 86580 TB INTRADERMAL TEST: CPT | Performed by: NURSE PRACTITIONER

## 2022-07-25 ENCOUNTER — WALK IN (OUTPATIENT)
Dept: URGENT CARE | Age: 32
End: 2022-07-25

## 2022-07-25 DIAGNOSIS — Z11.1 ENCOUNTER FOR PPD SKIN TEST READING: Primary | ICD-10-CM

## 2022-07-25 LAB
INDURATION: 0 MM (ref 0–?)
SKIN TEST RESULT: NEGATIVE

## 2022-07-25 PROCEDURE — X1094 NO CHARGE VISIT: HCPCS

## 2022-09-21 ENCOUNTER — HOSPITAL ENCOUNTER (OUTPATIENT)
Age: 32
Discharge: HOME | End: 2022-09-21

## 2022-09-21 VITALS
RESPIRATION RATE: 18 BRPM | TEMPERATURE: 98 F | SYSTOLIC BLOOD PRESSURE: 137 MMHG | OXYGEN SATURATION: 97 % | DIASTOLIC BLOOD PRESSURE: 97 MMHG | HEART RATE: 85 BPM

## 2022-09-21 DIAGNOSIS — J06.9 VIRAL URI: Primary | ICD-10-CM

## 2022-09-21 LAB
S PYO AG THROAT QL: NEGATIVE
SARS-COV-2 RNA RESP QL NAA+PROBE: NOT DETECTED

## 2022-09-21 PROCEDURE — 99213 OFFICE O/P EST LOW 20 MIN: CPT

## 2022-09-21 PROCEDURE — 99212 OFFICE O/P EST SF 10 MIN: CPT

## 2022-09-21 PROCEDURE — 87880 STREP A ASSAY W/OPTIC: CPT

## 2022-09-21 NOTE — ED INITIAL ASSESSMENT (HPI)
Sore throat, headache and bilateral ear pian began yesterday. Denies fevers sob or chest pain. Slight occasional cough.

## 2022-10-12 ENCOUNTER — PATIENT MESSAGE (OUTPATIENT)
Dept: FAMILY MEDICINE CLINIC | Facility: CLINIC | Age: 32
End: 2022-10-12

## 2022-10-13 NOTE — TELEPHONE ENCOUNTER
To reception staff, pls call pt for appt. Also Woogahart message sent to pt     Thanks.        Future Appointments   Date Time Provider [place] Mon[place]   11/16/2022  4:00 PM Nicole Ahn DO [facility] FP [facility] OP

## 2022-10-25 ENCOUNTER — NURSE ONLY (OUTPATIENT)
Dept: FAMILY MEDICINE CLINIC | Facility: CLINIC | Age: 32
End: 2022-10-25
Payer: MEDICAID

## 2022-10-25 VITALS — DIASTOLIC BLOOD PRESSURE: 88 MMHG | SYSTOLIC BLOOD PRESSURE: 130 MMHG

## 2022-10-25 DIAGNOSIS — Z23 NEED FOR INFLUENZA VACCINATION: Primary | ICD-10-CM

## 2022-10-25 PROCEDURE — 90686 IIV4 VACC NO PRSV 0.5 ML IM: CPT | Performed by: FAMILY MEDICINE

## 2022-10-25 PROCEDURE — 3079F DIAST BP 80-89 MM HG: CPT | Performed by: FAMILY MEDICINE

## 2022-10-25 PROCEDURE — 3075F SYST BP GE 130 - 139MM HG: CPT | Performed by: FAMILY MEDICINE

## 2022-10-25 PROCEDURE — 90471 IMMUNIZATION ADMIN: CPT | Performed by: FAMILY MEDICINE

## 2022-10-27 ENCOUNTER — OFFICE VISIT (OUTPATIENT)
Dept: FAMILY MEDICINE CLINIC | Facility: CLINIC | Age: 32
End: 2022-10-27
Payer: MEDICAID

## 2022-10-27 VITALS
OXYGEN SATURATION: 99 % | HEIGHT: 67 IN | HEART RATE: 72 BPM | WEIGHT: 150 LBS | SYSTOLIC BLOOD PRESSURE: 128 MMHG | BODY MASS INDEX: 23.54 KG/M2 | DIASTOLIC BLOOD PRESSURE: 84 MMHG

## 2022-10-27 DIAGNOSIS — Z80.3 FAMILY HISTORY OF BREAST CANCER: ICD-10-CM

## 2022-10-27 DIAGNOSIS — I10 HYPERTENSION WITH GOAL BLOOD PRESSURE LESS THAN 130/80: ICD-10-CM

## 2022-10-27 DIAGNOSIS — Z00.00 ENCOUNTER FOR ROUTINE ADULT HEALTH EXAMINATION WITHOUT ABNORMAL FINDINGS: Primary | ICD-10-CM

## 2022-10-27 PROCEDURE — 99213 OFFICE O/P EST LOW 20 MIN: CPT | Performed by: FAMILY MEDICINE

## 2022-10-27 PROCEDURE — 3008F BODY MASS INDEX DOCD: CPT | Performed by: FAMILY MEDICINE

## 2022-10-27 PROCEDURE — 3074F SYST BP LT 130 MM HG: CPT | Performed by: FAMILY MEDICINE

## 2022-10-27 PROCEDURE — 3079F DIAST BP 80-89 MM HG: CPT | Performed by: FAMILY MEDICINE

## 2022-10-27 PROCEDURE — 99395 PREV VISIT EST AGE 18-39: CPT | Performed by: FAMILY MEDICINE

## 2022-12-06 ENCOUNTER — HOSPITAL ENCOUNTER (OUTPATIENT)
Age: 32
Discharge: HOME | End: 2022-12-06
Payer: MEDICAID

## 2022-12-06 VITALS
HEART RATE: 91 BPM | OXYGEN SATURATION: 98 % | SYSTOLIC BLOOD PRESSURE: 126 MMHG | TEMPERATURE: 98 F | DIASTOLIC BLOOD PRESSURE: 84 MMHG | RESPIRATION RATE: 20 BRPM

## 2022-12-06 DIAGNOSIS — J06.9 VIRAL UPPER RESPIRATORY TRACT INFECTION: Primary | ICD-10-CM

## 2022-12-06 LAB
POCT INFLUENZA A: NEGATIVE
POCT INFLUENZA B: NEGATIVE
S PYO AG THROAT QL: NEGATIVE
SARS-COV-2 RNA RESP QL NAA+PROBE: NOT DETECTED

## 2022-12-06 PROCEDURE — 99213 OFFICE O/P EST LOW 20 MIN: CPT

## 2022-12-06 PROCEDURE — 87502 INFLUENZA DNA AMP PROBE: CPT

## 2022-12-06 PROCEDURE — 87880 STREP A ASSAY W/OPTIC: CPT

## 2022-12-06 NOTE — DISCHARGE INSTRUCTIONS
Influenza test is negative. COVID test is negative. Strep is negative. There are no signs of infection on physical exam.  This is likely a viral illness. Please be sure to drink plenty of fluids, use Tylenol and Motrin for pain or fever. Use Flonase and Mucinex for congestion. If you would like to take a decongestant you can use Coricidin HBP. If you develop any respiratory complaints, fever that does not improve with medications or any other concerning complaints you should go to the emergency department. Otherwise follow up with your primary care provider.

## 2022-12-12 RX ORDER — NIFEDIPINE 60 MG/1
TABLET, FILM COATED, EXTENDED RELEASE ORAL
Qty: 90 TABLET | Refills: 1 | Status: SHIPPED | OUTPATIENT
Start: 2022-12-12

## 2022-12-13 ENCOUNTER — PATIENT MESSAGE (OUTPATIENT)
Dept: FAMILY MEDICINE CLINIC | Facility: CLINIC | Age: 32
End: 2022-12-13

## 2022-12-13 ENCOUNTER — NURSE TRIAGE (OUTPATIENT)
Dept: FAMILY MEDICINE CLINIC | Facility: CLINIC | Age: 32
End: 2022-12-13

## 2022-12-13 ENCOUNTER — OFFICE VISIT (OUTPATIENT)
Dept: INTERNAL MEDICINE CLINIC | Facility: CLINIC | Age: 32
End: 2022-12-13
Payer: MEDICAID

## 2022-12-13 ENCOUNTER — HOSPITAL ENCOUNTER (OUTPATIENT)
Dept: GENERAL RADIOLOGY | Age: 32
Discharge: HOME | End: 2022-12-13
Attending: NURSE PRACTITIONER
Payer: MEDICAID

## 2022-12-13 VITALS
WEIGHT: 149 LBS | HEIGHT: 67 IN | TEMPERATURE: 99 F | RESPIRATION RATE: 16 BRPM | HEART RATE: 102 BPM | SYSTOLIC BLOOD PRESSURE: 117 MMHG | DIASTOLIC BLOOD PRESSURE: 75 MMHG | BODY MASS INDEX: 23.39 KG/M2 | OXYGEN SATURATION: 95 %

## 2022-12-13 DIAGNOSIS — R05.1 ACUTE COUGH: Primary | ICD-10-CM

## 2022-12-13 DIAGNOSIS — R05.1 ACUTE COUGH: ICD-10-CM

## 2022-12-13 PROCEDURE — 99203 OFFICE O/P NEW LOW 30 MIN: CPT | Performed by: NURSE PRACTITIONER

## 2022-12-13 PROCEDURE — 3008F BODY MASS INDEX DOCD: CPT | Performed by: NURSE PRACTITIONER

## 2022-12-13 PROCEDURE — 71046 X-RAY EXAM CHEST 2 VIEWS: CPT | Performed by: NURSE PRACTITIONER

## 2022-12-13 PROCEDURE — 3074F SYST BP LT 130 MM HG: CPT | Performed by: NURSE PRACTITIONER

## 2022-12-13 PROCEDURE — 3078F DIAST BP <80 MM HG: CPT | Performed by: NURSE PRACTITIONER

## 2022-12-13 RX ORDER — BENZONATATE 200 MG/1
200 CAPSULE ORAL 3 TIMES DAILY PRN
Qty: 30 CAPSULE | Refills: 0 | Status: SHIPPED | OUTPATIENT
Start: 2022-12-13

## 2022-12-13 RX ORDER — AZITHROMYCIN 250 MG/1
TABLET, FILM COATED ORAL
Qty: 6 TABLET | Refills: 0 | Status: SHIPPED | OUTPATIENT
Start: 2022-12-13 | End: 2022-12-18

## 2022-12-13 NOTE — TELEPHONE ENCOUNTER
Patient was seen in urgent care on 12/6/2022. Tested negative for COVID/flu/strep. Patient stated that she is still coughing a lot to the point where her chest is hurting. Has congestion. Frequent nose bleeds. Itchy eyes. Hoarse voice. No taste or smell. Not short of breath or wheezing. Temperature ranging from 99 to 100F. No other symptoms. Wanted to bee seen today. Lives in [place]. No appointments available with Dr Lucie Manuel or Samara Fontenot. Appointment made for today at 3:40pm with Konstantin Littlejohn in [address] [place] [address] provided.

## 2023-01-21 DIAGNOSIS — R05.1 ACUTE COUGH: ICD-10-CM

## 2023-01-22 RX ORDER — AZITHROMYCIN 250 MG/1
TABLET, FILM COATED ORAL
Qty: 6 TABLET | Refills: 0 | OUTPATIENT
Start: 2023-01-22

## 2023-02-07 ENCOUNTER — NURSE TRIAGE (OUTPATIENT)
Facility: CLINIC | Age: 33
End: 2023-02-07

## 2023-02-07 ENCOUNTER — OFFICE VISIT (OUTPATIENT)
Dept: FAMILY MEDICINE CLINIC | Facility: CLINIC | Age: 33
End: 2023-02-07

## 2023-02-07 VITALS
HEIGHT: 67 IN | WEIGHT: 151 LBS | SYSTOLIC BLOOD PRESSURE: 113 MMHG | DIASTOLIC BLOOD PRESSURE: 75 MMHG | BODY MASS INDEX: 23.7 KG/M2 | HEART RATE: 83 BPM

## 2023-02-07 DIAGNOSIS — M26.629 ARTHRALGIA OF TEMPOROMANDIBULAR JOINT, UNSPECIFIED LATERALITY: Primary | ICD-10-CM

## 2023-02-07 PROCEDURE — 3074F SYST BP LT 130 MM HG: CPT | Performed by: FAMILY MEDICINE

## 2023-02-07 PROCEDURE — 3078F DIAST BP <80 MM HG: CPT | Performed by: FAMILY MEDICINE

## 2023-02-07 PROCEDURE — 3008F BODY MASS INDEX DOCD: CPT | Performed by: FAMILY MEDICINE

## 2023-02-07 PROCEDURE — 99213 OFFICE O/P EST LOW 20 MIN: CPT | Performed by: FAMILY MEDICINE

## 2023-02-07 NOTE — PROGRESS NOTES
Blood pressure 113/75, pulse 83, height 5' 7\" (1.702 m), weight 151 lb (68.5 kg), last menstrual period 12/02/2022, not currently breastfeeding. Patient presents today complaining of right facial discomfort that is constant for the past couple of days. Has a history of TMJ. She does chew gum. She has had braces previously. She does not know if she grinds her teeth at night. Her right jaw clicks and cracks. CURRENTLY ON HER MENSES  Also complaining of fullness in the ears bilaterally.     Objective ears with TMs intact bilaterally    Tenderness noted right masseter    Assessment TMJ    Plan soft diet and Aleve    TMJ information printed out given to patient    Follow-up if no improvement

## 2023-02-21 ENCOUNTER — HOSPITAL ENCOUNTER (OUTPATIENT)
Age: 33
Discharge: HOME | End: 2023-02-21
Payer: MEDICAID

## 2023-02-21 VITALS
TEMPERATURE: 98 F | HEART RATE: 78 BPM | OXYGEN SATURATION: 98 % | DIASTOLIC BLOOD PRESSURE: 90 MMHG | SYSTOLIC BLOOD PRESSURE: 123 MMHG | RESPIRATION RATE: 18 BRPM

## 2023-02-21 DIAGNOSIS — R19.7 NAUSEA VOMITING AND DIARRHEA: Primary | ICD-10-CM

## 2023-02-21 DIAGNOSIS — R11.2 NAUSEA VOMITING AND DIARRHEA: Primary | ICD-10-CM

## 2023-02-21 LAB
#MXD IC: 0.6 X10ˆ3/UL (ref 0.1–1)
B-HCG UR QL: NEGATIVE
BILIRUB UR QL STRIP: NEGATIVE
BUN BLD-MCNC: 9 MG/DL (ref 7–18)
CHLORIDE BLD-SCNC: 101 MMOL/L (ref 98–112)
CLARITY UR: CLEAR
CO2 BLD-SCNC: 29 MMOL/L (ref 21–32)
COLOR UR: YELLOW
CREAT BLD-MCNC: 0.7 MG/DL
GFR SERPLBLD BASED ON 1.73 SQ M-ARVRAT: 118 ML/MIN/1.73M2 (ref 60–?)
GLUCOSE BLD-MCNC: 84 MG/DL (ref 70–99)
GLUCOSE UR STRIP-MCNC: NEGATIVE MG/DL
HCT VFR BLD AUTO: 37.7 %
HCT VFR BLD CALC: 39 %
HGB BLD-MCNC: 12.3 G/DL
HGB UR QL STRIP: NEGATIVE
ISTAT IONIZED CALCIUM FOR CHEM 8: 1.19 MMOL/L (ref 1.12–1.32)
LEUKOCYTE ESTERASE UR QL STRIP: NEGATIVE
LYMPHOCYTES # BLD AUTO: 1.6 X10ˆ3/UL (ref 1–4)
LYMPHOCYTES NFR BLD AUTO: 38.5 %
MCH RBC QN AUTO: 27.4 PG (ref 26–34)
MCHC RBC AUTO-ENTMCNC: 32.6 G/DL (ref 31–37)
MCV RBC AUTO: 84 FL (ref 80–100)
MIXED CELL %: 15 %
NEUTROPHILS # BLD AUTO: 2 X10ˆ3/UL (ref 1.5–7.7)
NEUTROPHILS NFR BLD AUTO: 46.5 %
NITRITE UR QL STRIP: NEGATIVE
PH UR STRIP: 7 [PH]
PLATELET # BLD AUTO: 269 X10ˆ3/UL (ref 150–450)
POTASSIUM BLD-SCNC: 3.7 MMOL/L (ref 3.6–5.1)
RBC # BLD AUTO: 4.49 X10ˆ6/UL
SODIUM BLD-SCNC: 141 MMOL/L (ref 136–145)
SP GR UR STRIP: 1.02
UROBILINOGEN UR STRIP-ACNC: <2 MG/DL
WBC # BLD AUTO: 4.2 X10ˆ3/UL (ref 4–11)

## 2023-02-21 PROCEDURE — 96374 THER/PROPH/DIAG INJ IV PUSH: CPT

## 2023-02-21 PROCEDURE — S0028 INJECTION, FAMOTIDINE, 20 MG: HCPCS

## 2023-02-21 PROCEDURE — 81025 URINE PREGNANCY TEST: CPT

## 2023-02-21 PROCEDURE — 85025 COMPLETE CBC W/AUTO DIFF WBC: CPT | Performed by: PHYSICIAN ASSISTANT

## 2023-02-21 PROCEDURE — 80047 BASIC METABLC PNL IONIZED CA: CPT

## 2023-02-21 PROCEDURE — 99214 OFFICE O/P EST MOD 30 MIN: CPT

## 2023-02-21 PROCEDURE — 96375 TX/PRO/DX INJ NEW DRUG ADDON: CPT

## 2023-02-21 PROCEDURE — 81002 URINALYSIS NONAUTO W/O SCOPE: CPT

## 2023-02-21 PROCEDURE — 96361 HYDRATE IV INFUSION ADD-ON: CPT

## 2023-02-21 RX ORDER — ONDANSETRON 2 MG/ML
4 INJECTION INTRAMUSCULAR; INTRAVENOUS ONCE
Status: COMPLETED | OUTPATIENT
Start: 2023-02-21 | End: 2023-02-21

## 2023-02-21 RX ORDER — ONDANSETRON 4 MG/1
4 TABLET, ORALLY DISINTEGRATING ORAL EVERY 8 HOURS PRN
Qty: 10 TABLET | Refills: 0 | Status: SHIPPED | OUTPATIENT
Start: 2023-02-21 | End: 2023-02-24

## 2023-02-21 RX ORDER — SODIUM CHLORIDE 9 MG/ML
1000 INJECTION, SOLUTION INTRAVENOUS ONCE
Status: COMPLETED | OUTPATIENT
Start: 2023-02-21 | End: 2023-02-21

## 2023-02-21 RX ORDER — FAMOTIDINE 10 MG/ML
20 INJECTION, SOLUTION INTRAVENOUS ONCE
Status: COMPLETED | OUTPATIENT
Start: 2023-02-21 | End: 2023-02-21

## 2023-02-21 NOTE — ED INITIAL ASSESSMENT (HPI)
Onset of nausea and vomiting with abdominal cramping 3 days ago followed by diarrhea. No fever. Last vomited at 3am. Abdominal pain resolved. Denies urinary symptoms. Home UCG negative.

## 2023-03-03 ENCOUNTER — APPOINTMENT (OUTPATIENT)
Dept: CT IMAGING | Age: 33
End: 2023-03-03
Attending: NURSE PRACTITIONER
Payer: MEDICAID

## 2023-03-03 ENCOUNTER — HOSPITAL ENCOUNTER (OUTPATIENT)
Age: 33
Discharge: HOME | End: 2023-03-03
Attending: NURSE PRACTITIONER
Payer: MEDICAID

## 2023-03-03 VITALS
RESPIRATION RATE: 20 BRPM | TEMPERATURE: 98 F | SYSTOLIC BLOOD PRESSURE: 122 MMHG | DIASTOLIC BLOOD PRESSURE: 86 MMHG | HEART RATE: 88 BPM | OXYGEN SATURATION: 98 %

## 2023-03-03 DIAGNOSIS — N12 PYELONEPHRITIS: ICD-10-CM

## 2023-03-03 DIAGNOSIS — D25.9 UTERINE LEIOMYOMA, UNSPECIFIED LOCATION: Primary | ICD-10-CM

## 2023-03-03 LAB
B-HCG UR QL: NEGATIVE
BILIRUB UR QL STRIP: NEGATIVE
BUN BLD-MCNC: 16 MG/DL (ref 7–18)
CHLORIDE BLD-SCNC: 102 MMOL/L (ref 98–112)
CLARITY UR: CLEAR
CO2 BLD-SCNC: 24 MMOL/L (ref 21–32)
COLOR UR: YELLOW
CREAT BLD-MCNC: 0.7 MG/DL
GFR SERPLBLD BASED ON 1.73 SQ M-ARVRAT: 118 ML/MIN/1.73M2 (ref 60–?)
GLUCOSE BLD-MCNC: 95 MG/DL (ref 70–99)
GLUCOSE UR STRIP-MCNC: NEGATIVE MG/DL
HCT VFR BLD CALC: 38 %
ISTAT IONIZED CALCIUM FOR CHEM 8: 1.18 MMOL/L (ref 1.12–1.32)
KETONES UR STRIP-MCNC: NEGATIVE MG/DL
NITRITE UR QL STRIP: NEGATIVE
PH UR STRIP: 7 [PH]
POTASSIUM BLD-SCNC: 3.7 MMOL/L (ref 3.6–5.1)
PROT UR STRIP-MCNC: 100 MG/DL
SODIUM BLD-SCNC: 139 MMOL/L (ref 136–145)
SP GR UR STRIP: 1.02
UROBILINOGEN UR STRIP-ACNC: <2 MG/DL

## 2023-03-03 PROCEDURE — 96372 THER/PROPH/DIAG INJ SC/IM: CPT

## 2023-03-03 PROCEDURE — 81002 URINALYSIS NONAUTO W/O SCOPE: CPT

## 2023-03-03 PROCEDURE — 87086 URINE CULTURE/COLONY COUNT: CPT | Performed by: NURSE PRACTITIONER

## 2023-03-03 PROCEDURE — 99214 OFFICE O/P EST MOD 30 MIN: CPT

## 2023-03-03 PROCEDURE — 80047 BASIC METABLC PNL IONIZED CA: CPT

## 2023-03-03 PROCEDURE — 87088 URINE BACTERIA CULTURE: CPT | Performed by: NURSE PRACTITIONER

## 2023-03-03 PROCEDURE — 87186 SC STD MICRODIL/AGAR DIL: CPT | Performed by: NURSE PRACTITIONER

## 2023-03-03 PROCEDURE — 81025 URINE PREGNANCY TEST: CPT

## 2023-03-03 PROCEDURE — 74176 CT ABD & PELVIS W/O CONTRAST: CPT | Performed by: NURSE PRACTITIONER

## 2023-03-03 RX ORDER — KETOROLAC TROMETHAMINE 30 MG/ML
30 INJECTION, SOLUTION INTRAMUSCULAR; INTRAVENOUS ONCE
Status: COMPLETED | OUTPATIENT
Start: 2023-03-03 | End: 2023-03-03

## 2023-03-03 RX ORDER — KETOROLAC TROMETHAMINE 30 MG/ML
30 INJECTION, SOLUTION INTRAMUSCULAR; INTRAVENOUS ONCE
Status: DISCONTINUED | OUTPATIENT
Start: 2023-03-03 | End: 2023-03-03

## 2023-03-03 RX ORDER — IBUPROFEN 600 MG/1
600 TABLET ORAL EVERY 6 HOURS PRN
Qty: 30 TABLET | Refills: 0 | Status: SHIPPED | OUTPATIENT
Start: 2023-03-03

## 2023-03-03 RX ORDER — CEFPODOXIME PROXETIL 200 MG/1
200 TABLET, FILM COATED ORAL 2 TIMES DAILY
Qty: 28 TABLET | Refills: 0 | Status: SHIPPED | OUTPATIENT
Start: 2023-03-03 | End: 2023-03-17

## 2023-03-04 NOTE — DISCHARGE INSTRUCTIONS
Follow up with your doctor this week to continue evaluation of UTI. Take antibiotic as directed. Finish full amount. A culture was sent so if this comes back positive for a bacteria not covered by antibiotic given, you will get a call from us to change medication. Increase water intake, urinate often. Do not hold your urine. Urinate right after intercourse and make sure wiping from front to back. RETURN OR GO TO ED For worsening back pain, fevers, worsening abdominal pain nausea and vomiting.

## 2023-03-21 ENCOUNTER — MED REC SCAN ONLY (OUTPATIENT)
Facility: CLINIC | Age: 33
End: 2023-03-21

## 2023-04-07 ENCOUNTER — TELEPHONE (OUTPATIENT)
Facility: CLINIC | Age: 33
End: 2023-04-07

## 2023-04-07 NOTE — TELEPHONE ENCOUNTER
Outreached to pt to provide physicians number for insurance papers,that the pt previous called requesting

## 2023-04-14 NOTE — TELEPHONE ENCOUNTER
ANTICOAGULATION MANAGEMENT:  Medication Refill    Anticoagulation Summary  As of 4/4/2023    Warfarin maintenance plan:  7.5 mg (5 mg x 1.5) every Thu; 5 mg (5 mg x 1) all other days   Next INR check:  4/18/2023   Target end date:  Indefinite    Indications    S/P AVR (aortic valve replacement)-25 mm [facility] [place]  [Z95.2]  H/O mechanical aortic valve replacement [Z95.2]  Long term current use of anticoagulants with INR goal of 2.0-3.0 [Z79.01]  S/P AVR [Z95.2]             Anticoagulation Care Providers     Provider Role Specialty Phone number    Moni Cartagena PA-C Referring Cardiovascular & Thoracic Surgery [phone]    Yan Lucas MD Referring Family Medicine [phone]          Visit with referring provider/group within last year: Yes    ACC referral signed within last year: Yes    Toney meets all criteria for refill (current [facility] referral, office visit with referring provider/group in last year, lab monitoring up to date or not exceeding 2 weeks overdue). Rx instructions and quantity supplied updated to match patient's current dosing plan. Warfarin 90 day supply with 1 refill granted per [facility] protocol     Daisy Watson RN  Anticoagulation Clinic       Called pt and informed can do breastfeeding, COVID booster and flu at the same time. Pt verbalized understanding and agrees with POC.

## 2023-05-30 ENCOUNTER — PATIENT MESSAGE (OUTPATIENT)
Facility: CLINIC | Age: 33
End: 2023-05-30

## 2023-08-18 RX ORDER — NIFEDIPINE 60 MG/1
60 TABLET, FILM COATED, EXTENDED RELEASE ORAL DAILY
Qty: 90 TABLET | Refills: 0 | Status: SHIPPED | OUTPATIENT
Start: 2023-08-18

## 2023-08-18 NOTE — TELEPHONE ENCOUNTER
Failed Protocol/No Protocol. Requested Prescriptions   Pending Prescriptions Disp Refills    NIFEDIPINE ER 60 MG Oral Tablet 24 Hr [Pharmacy Med Name: NIFEDIPINE 60MG ER (CC) TABLETS] 90 tablet 0     Sig: TAKE 1 TABLET(60 MG) BY MOUTH DAILY       Hypertensive Medications Protocol Failed - 8/18/2023  7:09 AM        Failed - CMP or BMP in past 6 months     No results found for this or any previous visit (from the past 4392 hour(s)).             Failed - In person appointment or virtual visit in the past 6 months     Recent Outpatient Visits              6 months ago Arthralgia of temporomandibular joint, unspecified laterality    [facility], [address], [place], Kaiden Blackman, DO    Office Visit    8 months ago Acute cough    [facility], Brian Chaudhary., APRN    Office Visit    9 months ago Encounter for routine adult health examination without abnormal findings    [address] [facility], [address] [place], [place]    Office Visit    9 months ago Need for influenza vaccination    [address] [facility], [place]    Nurse Only    1 year ago Urticaria    [address] [facility], [address] [facility], [facility] Visit          Future Appointments         Provider Department Appt Notes    In 2 months Anders Arguelles DO [address] [facility],[address], [facility], [address] [facility] - In person appointment in the past 12 or next 3 months     Recent Outpatient Visits              6 months ago Arthralgia of temporomandibular joint, unspecified laterality    [facility], [address], [place], [place]    Office Visit    8 months ago [facility]    Office Visit    9 months ago Encounter for routine adult health examination without abnormal findings    [facility] [address], [address] [place], [place]    Office Visit    9 months ago Need for influenza vaccination    [address] [facility], [facility]    Nurse Only    1 year ago [address] [facility], [address] [place], [place]    Office Visit          Future Appointments         Provider Department Appt Notes    In 2 months Arlander Hodgkins, DO [address], [address], [address] BP reading less than 140/90     BP Readings from Last 1 Encounters:  03/03/23 : 122/86              Passed - EGFRCR or GFRAA > 50     GFR Evaluation  EGFRCR: 118 , resulted on 3/3/2023               Future Appointments         Provider Department Appt Notes    In 2 months Arlander Hodgkins, DO [address], [address], [facility] Physical          Recent Outpatient Visits              6 months ago Arthralgia of temporomandibular joint, unspecified laterality    [facility], [address], [place] DO    Office Visit    8 months ago Acute cough    [facility], [address], [place], APRN    Office Visit    9 months ago Encounter for routine adult health examination without abnormal findings    [address] [facility], [address] [place], [place]    Office Visit    9 months ago Need for influenza vaccination    [address], [address], [facility]    Nurse Only    1 year ago Urticaria    [address] [facility], [address] [facility], [facility] [place] Visit

## 2023-09-04 ENCOUNTER — HOSPITAL ENCOUNTER (OUTPATIENT)
Age: 33
Discharge: HOME | End: 2023-09-04
Attending: EMERGENCY MEDICINE
Payer: COMMERCIAL

## 2023-09-04 VITALS
SYSTOLIC BLOOD PRESSURE: 126 MMHG | DIASTOLIC BLOOD PRESSURE: 89 MMHG | HEART RATE: 79 BPM | TEMPERATURE: 98 F | RESPIRATION RATE: 16 BRPM | OXYGEN SATURATION: 98 %

## 2023-09-04 DIAGNOSIS — H92.02 LEFT EAR PAIN: ICD-10-CM

## 2023-09-04 DIAGNOSIS — J06.9 UPPER RESPIRATORY TRACT INFECTION, UNSPECIFIED TYPE: Primary | ICD-10-CM

## 2023-09-04 PROCEDURE — 99212 OFFICE O/P EST SF 10 MIN: CPT

## 2023-09-22 ENCOUNTER — APPOINTMENT (OUTPATIENT)
Dept: CT IMAGING | Facility: HOSPITAL | Age: 33
DRG: 918 | End: 2023-09-22
Attending: EMERGENCY MEDICINE
Payer: MEDICAID

## 2023-09-22 ENCOUNTER — HOSPITAL ENCOUNTER (INPATIENT)
Facility: HOSPITAL | Age: 33
LOS: 1 days | Discharge: HOME | End: 2023-09-23
Attending: EMERGENCY MEDICINE | Admitting: HOSPITALIST
Payer: MEDICAID

## 2023-09-22 ENCOUNTER — APPOINTMENT (OUTPATIENT)
Dept: CT IMAGING | Facility: HOSPITAL | Age: 33
End: 2023-09-22
Attending: EMERGENCY MEDICINE
Payer: MEDICAID

## 2023-09-22 ENCOUNTER — HOSPITAL ENCOUNTER (INPATIENT)
Facility: HOSPITAL | Age: 33
LOS: 1 days | Discharge: HOME | DRG: 918 | End: 2023-09-23
Attending: EMERGENCY MEDICINE | Admitting: HOSPITALIST
Payer: MEDICAID

## 2023-09-22 DIAGNOSIS — T46.1X1A CALCIUM CHANNEL BLOCKER OVERDOSE, ACCIDENTAL OR UNINTENTIONAL, INITIAL ENCOUNTER: Primary | ICD-10-CM

## 2023-09-22 LAB
ALBUMIN SERPL-MCNC: 3.7 G/DL (ref 3.4–5)
ALBUMIN/GLOB SERPL: 0.8 {RATIO} (ref 1–2)
ALP LIVER SERPL-CCNC: 72 U/L
ALT SERPL-CCNC: 21 U/L
AMPHET UR QL SCN: NEGATIVE
ANION GAP SERPL CALC-SCNC: 7 MMOL/L (ref 0–18)
APAP SERPL-MCNC: <2 UG/ML (ref 10–30)
AST SERPL-CCNC: 18 U/L (ref 15–37)
B-HCG UR QL: NEGATIVE
BASOPHILS # BLD AUTO: 0.03 X10(3) UL (ref 0–0.2)
BASOPHILS NFR BLD AUTO: 0.5 %
BENZODIAZ UR QL SCN: NEGATIVE
BILIRUB SERPL-MCNC: 0.3 MG/DL (ref 0.1–2)
BUN BLD-MCNC: 12 MG/DL (ref 7–18)
BUN/CREAT SERPL: 16.4 (ref 10–20)
CALCIUM BLD-MCNC: 8.6 MG/DL (ref 8.5–10.1)
CANNABINOIDS UR QL SCN: NEGATIVE
CHLORIDE SERPL-SCNC: 106 MMOL/L (ref 98–112)
CO2 SERPL-SCNC: 26 MMOL/L (ref 21–32)
COCAINE UR QL: NEGATIVE
CREAT BLD-MCNC: 0.73 MG/DL
CREAT UR-SCNC: 37 MG/DL
DEPRECATED RDW RBC AUTO: 37.2 FL (ref 35.1–46.3)
EGFRCR SERPLBLD CKD-EPI 2021: 112 ML/MIN/1.73M2 (ref 60–?)
EOSINOPHIL # BLD AUTO: 0.21 X10(3) UL (ref 0–0.7)
EOSINOPHIL NFR BLD AUTO: 3.2 %
ERYTHROCYTE [DISTWIDTH] IN BLOOD BY AUTOMATED COUNT: 12.2 % (ref 11–15)
ETHANOL SERPL-MCNC: <3 MG/DL (ref ?–3)
GLOBULIN PLAS-MCNC: 4.8 G/DL (ref 2.8–4.4)
GLUCOSE BLD-MCNC: 99 MG/DL (ref 70–99)
HCT VFR BLD AUTO: 37.9 %
HGB BLD-MCNC: 12.7 G/DL
IMM GRANULOCYTES # BLD AUTO: 0.02 X10(3) UL (ref 0–1)
IMM GRANULOCYTES NFR BLD: 0.3 %
LYMPHOCYTES # BLD AUTO: 2.57 X10(3) UL (ref 1–4)
LYMPHOCYTES NFR BLD AUTO: 39.7 %
MAGNESIUM SERPL-MCNC: 2 MG/DL (ref 1.6–2.6)
MCH RBC QN AUTO: 28.3 PG (ref 26–34)
MCHC RBC AUTO-ENTMCNC: 33.5 G/DL (ref 31–37)
MCV RBC AUTO: 84.6 FL
MDMA UR QL SCN: NEGATIVE
MONOCYTES # BLD AUTO: 0.54 X10(3) UL (ref 0.1–1)
MONOCYTES NFR BLD AUTO: 8.3 %
NEUTROPHILS # BLD AUTO: 3.11 X10 (3) UL (ref 1.5–7.7)
NEUTROPHILS # BLD AUTO: 3.11 X10(3) UL (ref 1.5–7.7)
NEUTROPHILS NFR BLD AUTO: 48 %
OPIATES UR QL SCN: NEGATIVE
OSMOLALITY SERPL CALC.SUM OF ELEC: 288 MOSM/KG (ref 275–295)
OXYCODONE UR QL SCN: NEGATIVE
PLATELET # BLD AUTO: 257 10(3)UL (ref 150–450)
POTASSIUM SERPL-SCNC: 3.3 MMOL/L (ref 3.5–5.1)
PROT SERPL-MCNC: 8.5 G/DL (ref 6.4–8.2)
RBC # BLD AUTO: 4.48 X10(6)UL
SALICYLATES SERPL-MCNC: 2.2 MG/DL (ref 2.8–20)
SODIUM SERPL-SCNC: 139 MMOL/L (ref 136–145)
TROPONIN I HIGH SENSITIVITY: 22 NG/L
WBC # BLD AUTO: 6.5 X10(3) UL (ref 4–11)

## 2023-09-22 PROCEDURE — 74177 CT ABD & PELVIS W/CONTRAST: CPT | Performed by: EMERGENCY MEDICINE

## 2023-09-22 PROCEDURE — 99222 1ST HOSP IP/OBS MODERATE 55: CPT | Performed by: HOSPITALIST

## 2023-09-22 RX ORDER — PROCHLORPERAZINE EDISYLATE 5 MG/ML
5 INJECTION INTRAMUSCULAR; INTRAVENOUS EVERY 8 HOURS PRN
Status: DISCONTINUED | OUTPATIENT
Start: 2023-09-22 | End: 2023-09-23

## 2023-09-22 RX ORDER — HEPARIN SODIUM 5000 [USP'U]/ML
5000 INJECTION, SOLUTION INTRAVENOUS; SUBCUTANEOUS EVERY 12 HOURS SCHEDULED
Status: DISCONTINUED | OUTPATIENT
Start: 2023-09-22 | End: 2023-09-23

## 2023-09-22 RX ORDER — ONDANSETRON 2 MG/ML
4 INJECTION INTRAMUSCULAR; INTRAVENOUS EVERY 6 HOURS PRN
Status: DISCONTINUED | OUTPATIENT
Start: 2023-09-22 | End: 2023-09-23

## 2023-09-22 RX ORDER — ACETAMINOPHEN 500 MG
500 TABLET ORAL EVERY 4 HOURS PRN
Status: DISCONTINUED | OUTPATIENT
Start: 2023-09-22 | End: 2023-09-23

## 2023-09-22 RX ORDER — SODIUM CHLORIDE 9 MG/ML
INJECTION, SOLUTION INTRAVENOUS CONTINUOUS
Status: DISCONTINUED | OUTPATIENT
Start: 2023-09-22 | End: 2023-09-23

## 2023-09-22 RX ORDER — TEMAZEPAM 15 MG/1
15 CAPSULE ORAL NIGHTLY PRN
Status: DISCONTINUED | OUTPATIENT
Start: 2023-09-22 | End: 2023-09-23

## 2023-09-22 NOTE — ED QUICK NOTES
Pt refusing to drink activated charcoal. This rn informed pt of importance of activated charcoal. Pt states she does not want to vomit because of medication. ERMD made aware. Pt requesting to speak to MD prior to taking medication.

## 2023-09-22 NOTE — H&P
[facility]    PATIENT'S NAME: Grace VIEIRA   ATTENDING PHYSICIAN: La Watson MD   PATIENT ACCOUNT#:   [id number]    LOCATION:  [place] [address]  MEDICAL RECORD #:   [id number]       YOB: 1990  ADMISSION DATE:       09/22/2023    HISTORY AND PHYSICAL EXAMINATION    CHIEF COMPLAINT:  Accidental calcium channel blocker overdose. HISTORY OF PRESENT ILLNESS:  Patient is a 70-year-old [facility] American female with a history of hypertension and takes Procardia ER 60 mg daily. She took her morning dose at 6 a.m. Around 1 p.m., she reached to her bag and took 3 tablets of ibuprofen and then realized she took 3 extra tablets of Procardia by accident. Shortly after started developing lightheadedness and fatigue. Came into the emergency department for evaluation. CBC and chemistry were unremarkable. Potassium 3.3. Heart rate was between 90 and 100. Blood pressure now is stable at 117. PAST MEDICAL HISTORY:  Hypertension and gestational hypertension. PAST SURGICAL HISTORY:  None. MEDICATIONS:  Only Procardia. ALLERGIES:  Penicillin and shellfish. FAMILY HISTORY:  Positive for hypertension. SOCIAL HISTORY:  No tobacco.  Occasional alcohol. No drug use. Lives with her family. Independent for basic activities of daily living. REVIEW OF SYSTEMS:  Slightly lightheaded and fatigued. No chest pain. No palpitations. She has some abdominal discomfort for which CT scan was ordered and still pending. PHYSICAL EXAMINATION:    GENERAL:  Alert and oriented to time, place and person. No acute distress. Anxious. VITAL SIGNS:  Temperature 98.0, pulse 82, respiratory rate 19, blood pressure 117/78, pulse ox 99% on room air. HEENT:  Atraumatic. Oropharynx clear. Moist mucous membranes. Normal hard and soft palate. Eyes:  Anicteric sclerae. NECK:  Supple. No lymphadenopathy. Trachea midline. Full range of motion.    LUNGS:  Clear to auscultation bilaterally. Normal respiratory effort. HEART:  Regular rate and rhythm. S1 and S2 auscultated. No murmur. ABDOMEN:  Soft, nondistended. No tenderness. Positive bowel sounds. EXTREMITIES:  No peripheral edema, clubbing or cyanosis. NEUROLOGIC:  Motor and sensory intact. ASSESSMENT:  Accidental overdose on Procardia (nifedipine), total 240 mg taken today. PLAN:  Patient will be admitted to general medical floor, remote telemetry. IV fluids. Monitor her clinical status. Monitor her blood pressure and heart rate. Currently hemodynamically stable. Further recommendations to follow.      Dictated By Shae Mendoza MD  d: 09/22/2023 18:16:26  t: 09/22/2023 18:43:06  Job [id number]  AK/

## 2023-09-22 NOTE — ED QUICK NOTES
This rn  spoke to Dr. Krish Goodman with poison control. Per Dr. Krish Goodman, monitor patient vitals and call MD back if pt \"has any significant changes. \" Pt to be admitted for observation.     Dr. Krish Goodman # [phone]

## 2023-09-22 NOTE — ED INITIAL ASSESSMENT (HPI)
Case # [id number]
Pt accidentally took nifedipine 60mg ER x 4 today mistaking it for ibuprofen. Pt disoriented and flaccid in triage. Taken to room 41 via WC. Charge RN notified. Poison controlled called.
Recommended charcoal if patient can safely swallow. Start on IV fluids or levophed for hypotension. Order CBC, CMP, Magnesium, ASA, tylenol, drug screen, EKG    Recommend overnight admission for Delayed/sudden hypotension. Poison control will fax over guidelines for additional complication to pod 4.
declines

## 2023-09-23 VITALS
SYSTOLIC BLOOD PRESSURE: 133 MMHG | WEIGHT: 143.5 LBS | RESPIRATION RATE: 18 BRPM | DIASTOLIC BLOOD PRESSURE: 88 MMHG | TEMPERATURE: 99 F | HEIGHT: 67 IN | BODY MASS INDEX: 22.52 KG/M2 | OXYGEN SATURATION: 99 % | HEART RATE: 60 BPM

## 2023-09-23 LAB
ANION GAP SERPL CALC-SCNC: 3 MMOL/L (ref 0–18)
BASOPHILS # BLD AUTO: 0.04 X10(3) UL (ref 0–0.2)
BASOPHILS NFR BLD AUTO: 0.9 %
BUN BLD-MCNC: 8 MG/DL (ref 7–18)
BUN/CREAT SERPL: 10.8 (ref 10–20)
CALCIUM BLD-MCNC: 8.2 MG/DL (ref 8.5–10.1)
CHLORIDE SERPL-SCNC: 111 MMOL/L (ref 98–112)
CO2 SERPL-SCNC: 26 MMOL/L (ref 21–32)
CREAT BLD-MCNC: 0.74 MG/DL
DEPRECATED RDW RBC AUTO: 36.7 FL (ref 35.1–46.3)
EGFRCR SERPLBLD CKD-EPI 2021: 110 ML/MIN/1.73M2 (ref 60–?)
EOSINOPHIL # BLD AUTO: 0.25 X10(3) UL (ref 0–0.7)
EOSINOPHIL NFR BLD AUTO: 5.5 %
ERYTHROCYTE [DISTWIDTH] IN BLOOD BY AUTOMATED COUNT: 12.1 % (ref 11–15)
GLUCOSE BLD-MCNC: 93 MG/DL (ref 70–99)
HCT VFR BLD AUTO: 34.1 %
HGB BLD-MCNC: 11.3 G/DL
IMM GRANULOCYTES # BLD AUTO: 0.01 X10(3) UL (ref 0–1)
IMM GRANULOCYTES NFR BLD: 0.2 %
LYMPHOCYTES # BLD AUTO: 1.86 X10(3) UL (ref 1–4)
LYMPHOCYTES NFR BLD AUTO: 40.8 %
MCH RBC QN AUTO: 28 PG (ref 26–34)
MCHC RBC AUTO-ENTMCNC: 33.1 G/DL (ref 31–37)
MCV RBC AUTO: 84.4 FL
MONOCYTES # BLD AUTO: 0.48 X10(3) UL (ref 0.1–1)
MONOCYTES NFR BLD AUTO: 10.5 %
NEUTROPHILS # BLD AUTO: 1.92 X10 (3) UL (ref 1.5–7.7)
NEUTROPHILS # BLD AUTO: 1.92 X10(3) UL (ref 1.5–7.7)
NEUTROPHILS NFR BLD AUTO: 42.1 %
OSMOLALITY SERPL CALC.SUM OF ELEC: 288 MOSM/KG (ref 275–295)
PLATELET # BLD AUTO: 249 10(3)UL (ref 150–450)
POTASSIUM SERPL-SCNC: 4 MMOL/L (ref 3.5–5.1)
RBC # BLD AUTO: 4.04 X10(6)UL
SODIUM SERPL-SCNC: 140 MMOL/L (ref 136–145)
WBC # BLD AUTO: 4.6 X10(3) UL (ref 4–11)

## 2023-09-23 PROCEDURE — 99239 HOSP IP/OBS DSCHRG MGMT >30: CPT | Performed by: HOSPITALIST

## 2023-09-23 RX ORDER — POTASSIUM CHLORIDE 20 MEQ/1
40 TABLET, EXTENDED RELEASE ORAL ONCE
Status: COMPLETED | OUTPATIENT
Start: 2023-09-23 | End: 2023-09-23

## 2023-09-23 NOTE — PLAN OF CARE
Problem: Patient Centered Care  Goal: Patient preferences are identified and integrated in the patient's plan of care  Description: Interventions:  - What would you like us to know as we care for you?  From home with family  - Provide timely, complete, and accurate information to patient/family  - Incorporate patient and family knowledge, values, beliefs, and cultural backgrounds into the planning and delivery of care  - Encourage patient/family to participate in care and decision-making at the level they choose  - Honor patient and family perspectives and choices  Outcome: Progressing     Problem: Patient/Family Goals  Goal: Patient/Family Long Term Goal  Description: Patient's Long Term Goal: Discharge from the hospital    Interventions:  - Monitor vital signs  - Monitor appropriate labs  - Pain management  - Administer medications per order  - Follow MD orders  - Diagnostics per order  - Update / inform patient and family on plan of care  - Discharge planning  - See additional Care Plan goals for specific interventions  Outcome: Progressing  Goal: Patient/Family Short Term Goal  Description: Patient's Short Term Goal: Improve dizziness and lightheadedness    Interventions:   - Monitor vital signs  - Monitor appropriate labs  - Pain management  - Administer medications per order  - Follow MD orders  - Diagnostics per order  - Update / inform patient and family on plan of care  - See additional Care Plan goals for specific interventions  Outcome: Progressing     Problem: PAIN - ADULT  Goal: Verbalizes/displays adequate comfort level or patient's stated pain goal  Description: INTERVENTIONS:  - Encourage pt to monitor pain and request assistance  - Assess pain using appropriate pain scale  - Administer analgesics based on type and severity of pain and evaluate response  - Implement non-pharmacological measures as appropriate and evaluate response  - Consider cultural and social influences on pain and pain management  - Manage/alleviate anxiety  - Utilize distraction and/or relaxation techniques  - Monitor for opioid side effects  - Notify MD/LIP if interventions unsuccessful or patient reports new pain  - Anticipate increased pain with activity and pre-medicate as appropriate  Outcome: Progressing     Problem: SAFETY ADULT - FALL  Goal: Free from fall injury  Description: INTERVENTIONS:  - Assess pt frequently for physical needs  - Identify cognitive and physical deficits and behaviors that affect risk of falls.   - [place] fall precautions as indicated by assessment.  - Educate pt/family on patient safety including physical limitations  - Instruct pt to call for assistance with activity based on assessment  - Modify environment to reduce risk of injury  - Provide assistive devices as appropriate  - Consider OT/PT consult to assist with strengthening/mobility  - Encourage toileting schedule  Outcome: Progressing     Problem: DISCHARGE PLANNING  Goal: Discharge to home or other facility with appropriate resources  Description: INTERVENTIONS:  - Identify barriers to discharge w/pt and caregiver  - Include patient/family/discharge partner in discharge planning  - Arrange for needed discharge resources and transportation as appropriate  - Identify discharge learning needs (meds, wound care, etc)  - Arrange for interpreters to assist at discharge as needed  - Consider post-discharge preferences of patient/family/discharge partner  - Complete POLST form as appropriate  - Assess patient's ability to be responsible for managing their own health  - Refer to Case Management Department for coordinating discharge planning if the patient needs post-hospital services based on physician/LIP order or complex needs related to functional status, cognitive ability or social support system  Outcome: Progressing     Problem: CARDIOVASCULAR - ADULT  Goal: Maintains optimal cardiac output and hemodynamic stability  Description: INTERVENTIONS:  - Monitor vital signs, rhythm, and trends  - Monitor for bleeding, hypotension and signs of decreased cardiac output  - Evaluate effectiveness of vasoactive medications to optimize hemodynamic stability  - Monitor arterial and/or venous puncture sites for bleeding and/or hematoma  - Assess quality of pulses, skin color and temperature  - Assess for signs of decreased coronary artery perfusion - ex.  Angina  - Evaluate fluid balance, assess for edema, trend weights  Outcome: Progressing  Goal: Absence of cardiac arrhythmias or at baseline  Description: INTERVENTIONS:  - Continuous cardiac monitoring, monitor vital signs, obtain 12 lead EKG if indicated  - Evaluate effectiveness of antiarrhythmic and heart rate control medications as ordered  - Initiate emergency measures for life threatening arrhythmias  - Monitor electrolytes and administer replacement therapy as ordered  Outcome: Progressing     Problem: SKIN/TISSUE INTEGRITY - ADULT  Goal: Skin integrity remains intact  Description: INTERVENTIONS  - Assess and document risk factors for pressure ulcer development  - Assess and document skin integrity  - Monitor for areas of redness and/or skin breakdown  - Initiate interventions, skin care algorithm/standards of care as needed  Outcome: Progressing     Problem: MUSCULOSKELETAL - ADULT  Goal: Return mobility to safest level of function  Description: INTERVENTIONS:  - Assess patient stability and activity tolerance for standing, transferring and ambulating w/ or w/o assistive devices  - Assist with transfers and ambulation using safe patient handling equipment as needed  - Ensure adequate protection for wounds/incisions during mobilization  - Obtain PT/OT consults as needed  - Advance activity as appropriate  - Communicate ordered activity level and limitations with patient/family  Outcome: Progressing     Problem: NEUROLOGICAL - ADULT  Goal: Achieves stable or improved neurological status  Description: INTERVENTIONS  - Assess for and report changes in neurological status  - Initiate measures to prevent increased intracranial pressure  - Maintain blood pressure and fluid volume within ordered parameters to optimize cerebral perfusion and minimize risk of hemorrhage  - Monitor temperature, glucose, and sodium. Initiate appropriate interventions as ordered  Outcome: Progressing     Patient is presently resting in the bed. Alert x 4. Vital signs taken and stable. Tolerates diet well. No complaints of dizziness or pain. Patient ambulated in hallway with staff member and tolerated it well. Call light within reach at all times. Safety precautions are followed at all times. Patient will be discharge home today with family.

## 2023-09-23 NOTE — ED QUICK NOTES
Orders for admission, patient is aware of plan and ready to go upstairs. Any questions, please call ED RN kailash at extension [phone].      Patient Covid vaccination status: Fully vaccinated     COVID Test Ordered in ED: None    COVID Suspicion at Admission: N/A    Running Infusions:  None    Mental Status/LOC at time of transport: AOx4    Other pertinent information:   CIWA score: N/A   NIH score:  N/A

## 2023-09-23 NOTE — DISCHARGE INSTRUCTIONS
(1) Follow up with primary care doctor for appointment in 1 week. (2) Please take all medications as directed by the doctor. (3) Notify doctor immediately for fever, chills, dizziness or shortness of breath.      Resume Nifedipine 9/24/23

## 2023-09-23 NOTE — PLAN OF CARE
Problem: Patient Centered Care  Goal: Patient preferences are identified and integrated in the patient's plan of care  Description: Interventions:  - What would you like us to know as we care for you?  From home with family  - Provide timely, complete, and accurate information to patient/family  - Incorporate patient and family knowledge, values, beliefs, and cultural backgrounds into the planning and delivery of care  - Encourage patient/family to participate in care and decision-making at the level they choose  - Honor patient and family perspectives and choices  Outcome: Progressing     Problem: Patient/Family Goals  Goal: Patient/Family Long Term Goal  Description: Patient's Long Term Goal: Discharge from the hospital    Interventions:  - Monitor vital signs  - Monitor appropriate labs  - Pain management  - Administer medications per order  - Follow MD orders  - Diagnostics per order  - Update / inform patient and family on plan of care  - Discharge planning  - See additional Care Plan goals for specific interventions  Outcome: Progressing  Goal: Patient/Family Short Term Goal  Description: Patient's Short Term Goal: Improve dizziness and lightheadedness    Interventions:   - Monitor vital signs  - Monitor appropriate labs  - Pain management  - Administer medications per order  - Follow MD orders  - Diagnostics per order  - Update / inform patient and family on plan of care  - See additional Care Plan goals for specific interventions  Outcome: Progressing     Problem: PAIN - ADULT  Goal: Verbalizes/displays adequate comfort level or patient's stated pain goal  Description: INTERVENTIONS:  - Encourage pt to monitor pain and request assistance  - Assess pain using appropriate pain scale  - Administer analgesics based on type and severity of pain and evaluate response  - Implement non-pharmacological measures as appropriate and evaluate response  - Consider cultural and social influences on pain and pain management  - Manage/alleviate anxiety  - Utilize distraction and/or relaxation techniques  - Monitor for opioid side effects  - Notify MD/LIP if interventions unsuccessful or patient reports new pain  - Anticipate increased pain with activity and pre-medicate as appropriate  Outcome: Progressing     Problem: SAFETY ADULT - FALL  Goal: Free from fall injury  Description: INTERVENTIONS:  - Assess pt frequently for physical needs  - Identify cognitive and physical deficits and behaviors that affect risk of falls.   - [place] fall precautions as indicated by assessment.  - Educate pt/family on patient safety including physical limitations  - Instruct pt to call for assistance with activity based on assessment  - Modify environment to reduce risk of injury  - Provide assistive devices as appropriate  - Consider OT/PT consult to assist with strengthening/mobility  - Encourage toileting schedule  Outcome: Progressing     Problem: DISCHARGE PLANNING  Goal: Discharge to home or other facility with appropriate resources  Description: INTERVENTIONS:  - Identify barriers to discharge w/pt and caregiver  - Include patient/family/discharge partner in discharge planning  - Arrange for needed discharge resources and transportation as appropriate  - Identify discharge learning needs (meds, wound care, etc)  - Arrange for interpreters to assist at discharge as needed  - Consider post-discharge preferences of patient/family/discharge partner  - Complete POLST form as appropriate  - Assess patient's ability to be responsible for managing their own health  - Refer to Case Management Department for coordinating discharge planning if the patient needs post-hospital services based on physician/LIP order or complex needs related to functional status, cognitive ability or social support system  Outcome: Progressing     Problem: CARDIOVASCULAR - ADULT  Goal: Maintains optimal cardiac output and hemodynamic stability  Description: INTERVENTIONS:  - Monitor vital signs, rhythm, and trends  - Monitor for bleeding, hypotension and signs of decreased cardiac output  - Evaluate effectiveness of vasoactive medications to optimize hemodynamic stability  - Monitor arterial and/or venous puncture sites for bleeding and/or hematoma  - Assess quality of pulses, skin color and temperature  - Assess for signs of decreased coronary artery perfusion - ex.  Angina  - Evaluate fluid balance, assess for edema, trend weights  Outcome: Progressing  Goal: Absence of cardiac arrhythmias or at baseline  Description: INTERVENTIONS:  - Continuous cardiac monitoring, monitor vital signs, obtain 12 lead EKG if indicated  - Evaluate effectiveness of antiarrhythmic and heart rate control medications as ordered  - Initiate emergency measures for life threatening arrhythmias  - Monitor electrolytes and administer replacement therapy as ordered  Outcome: Progressing     Problem: SKIN/TISSUE INTEGRITY - ADULT  Goal: Skin integrity remains intact  Description: INTERVENTIONS  - Assess and document risk factors for pressure ulcer development  - Assess and document skin integrity  - Monitor for areas of redness and/or skin breakdown  - Initiate interventions, skin care algorithm/standards of care as needed  Outcome: Progressing     Problem: MUSCULOSKELETAL - ADULT  Goal: Return mobility to safest level of function  Description: INTERVENTIONS:  - Assess patient stability and activity tolerance for standing, transferring and ambulating w/ or w/o assistive devices  - Assist with transfers and ambulation using safe patient handling equipment as needed  - Ensure adequate protection for wounds/incisions during mobilization  - Obtain PT/OT consults as needed  - Advance activity as appropriate  - Communicate ordered activity level and limitations with patient/family  Outcome: Progressing     Problem: NEUROLOGICAL - ADULT  Goal: Achieves stable or improved neurological status  Description: INTERVENTIONS  - Assess for and report changes in neurological status  - Initiate measures to prevent increased intracranial pressure  - Maintain blood pressure and fluid volume within ordered parameters to optimize cerebral perfusion and minimize risk of hemorrhage  - Monitor temperature, glucose, and sodium. Initiate appropriate interventions as ordered  Outcome: Progressing     Monitoring vital signs - stable at this time. No acute changes noted at this moment. Safety and fall precautions maintained - bed alarm on, bed locked in lowest position, call light within reach. Frequent rounding by nursing staff.

## 2023-09-23 NOTE — DISCHARGE PLANNING
Discharge instructions given to patient. Patient was instructed to follow up with doctor for appointment. Saline lock removed. Patient verbalized understanding of discharge instructions. Patient will be discharge home when family arrives.

## 2023-09-24 LAB
ATRIAL RATE: 78 BPM
P AXIS: 74 DEGREES
P-R INTERVAL: 148 MS
Q-T INTERVAL: 388 MS
QRS DURATION: 84 MS
QTC CALCULATION (BEZET): 442 MS
R AXIS: 43 DEGREES
T AXIS: 23 DEGREES
VENTRICULAR RATE: 78 BPM

## 2023-09-25 ENCOUNTER — PATIENT OUTREACH (OUTPATIENT)
Dept: CASE MANAGEMENT | Age: 33
End: 2023-09-25

## 2023-09-28 ENCOUNTER — TELEPHONE (OUTPATIENT)
Dept: OBGYN CLINIC | Facility: CLINIC | Age: 33
End: 2023-09-28

## 2023-09-28 NOTE — TELEPHONE ENCOUNTER
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 Please assist.

## 2023-09-28 NOTE — TELEPHONE ENCOUNTER
Pt is calling requesting a a earlier appt then what was offered due to pt being in er on 9/22/2023 for accidentally  overdose on prescribed medication. Please advise.

## 2023-09-28 NOTE — TELEPHONE ENCOUNTER
Dr. Cecy Herron disregard message below.  Patient called back and schedule for 10/6/2023 at 12:30pm.

## 2023-10-06 ENCOUNTER — OFFICE VISIT (OUTPATIENT)
Facility: CLINIC | Age: 33
End: 2023-10-06
Payer: MEDICAID

## 2023-10-06 ENCOUNTER — LAB ENCOUNTER (OUTPATIENT)
Dept: LAB | Facility: REFERENCE LAB | Age: 33
End: 2023-10-06
Attending: FAMILY MEDICINE
Payer: MEDICAID

## 2023-10-06 VITALS
DIASTOLIC BLOOD PRESSURE: 84 MMHG | HEIGHT: 67 IN | OXYGEN SATURATION: 96 % | SYSTOLIC BLOOD PRESSURE: 124 MMHG | BODY MASS INDEX: 23.86 KG/M2 | HEART RATE: 89 BPM | WEIGHT: 152 LBS

## 2023-10-06 DIAGNOSIS — R77.1 ELEVATED SERUM GLOBULIN LEVEL: ICD-10-CM

## 2023-10-06 DIAGNOSIS — Z00.00 ENCOUNTER FOR ROUTINE ADULT HEALTH EXAMINATION WITHOUT ABNORMAL FINDINGS: ICD-10-CM

## 2023-10-06 DIAGNOSIS — I10 HYPERTENSION WITH GOAL BLOOD PRESSURE LESS THAN 130/80: ICD-10-CM

## 2023-10-06 DIAGNOSIS — T46.1X1A CALCIUM CHANNEL BLOCKER OVERDOSE, ACCIDENTAL OR UNINTENTIONAL, INITIAL ENCOUNTER: Primary | ICD-10-CM

## 2023-10-06 LAB
ALBUMIN SERPL-MCNC: 3.6 G/DL (ref 3.4–5)
ALBUMIN/GLOB SERPL: 0.8 {RATIO} (ref 1–2)
ALP LIVER SERPL-CCNC: 64 U/L
ALT SERPL-CCNC: 16 U/L
ANION GAP SERPL CALC-SCNC: 7 MMOL/L (ref 0–18)
AST SERPL-CCNC: 16 U/L (ref 15–37)
BASOPHILS # BLD AUTO: 0.04 X10(3) UL (ref 0–0.2)
BASOPHILS NFR BLD AUTO: 0.8 %
BILIRUB SERPL-MCNC: 0.4 MG/DL (ref 0.1–2)
BUN BLD-MCNC: 11 MG/DL (ref 7–18)
BUN/CREAT SERPL: 14.7 (ref 10–20)
CALCIUM BLD-MCNC: 9 MG/DL (ref 8.5–10.1)
CHLORIDE SERPL-SCNC: 106 MMOL/L (ref 98–112)
CHOLEST SERPL-MCNC: 184 MG/DL (ref ?–200)
CO2 SERPL-SCNC: 27 MMOL/L (ref 21–32)
CREAT BLD-MCNC: 0.75 MG/DL
CREAT UR-SCNC: 63.2 MG/DL
DEPRECATED RDW RBC AUTO: 39.4 FL (ref 35.1–46.3)
EGFRCR SERPLBLD CKD-EPI 2021: 108 ML/MIN/1.73M2 (ref 60–?)
EOSINOPHIL # BLD AUTO: 0.23 X10(3) UL (ref 0–0.7)
EOSINOPHIL NFR BLD AUTO: 4.5 %
ERYTHROCYTE [DISTWIDTH] IN BLOOD BY AUTOMATED COUNT: 12.5 % (ref 11–15)
EST. AVERAGE GLUCOSE BLD GHB EST-MCNC: 105 MG/DL (ref 68–126)
FASTING PATIENT LIPID ANSWER: NO
FASTING STATUS PATIENT QL REPORTED: NO
GLOBULIN PLAS-MCNC: 4.8 G/DL (ref 2.8–4.4)
GLUCOSE BLD-MCNC: 93 MG/DL (ref 70–99)
HBA1C MFR BLD: 5.3 % (ref ?–5.7)
HCT VFR BLD AUTO: 37 %
HCV AB SERPL QL IA: NONREACTIVE
HDLC SERPL-MCNC: 58 MG/DL (ref 40–59)
HGB BLD-MCNC: 11.9 G/DL
IMM GRANULOCYTES # BLD AUTO: 0.01 X10(3) UL (ref 0–1)
IMM GRANULOCYTES NFR BLD: 0.2 %
LDLC SERPL CALC-MCNC: 108 MG/DL (ref ?–100)
LYMPHOCYTES # BLD AUTO: 1.96 X10(3) UL (ref 1–4)
LYMPHOCYTES NFR BLD AUTO: 38.2 %
MCH RBC QN AUTO: 27.7 PG (ref 26–34)
MCHC RBC AUTO-ENTMCNC: 32.2 G/DL (ref 31–37)
MCV RBC AUTO: 86.2 FL
MICROALBUMIN UR-MCNC: <0.5 MG/DL
MONOCYTES # BLD AUTO: 0.55 X10(3) UL (ref 0.1–1)
MONOCYTES NFR BLD AUTO: 10.7 %
NEUTROPHILS # BLD AUTO: 2.34 X10 (3) UL (ref 1.5–7.7)
NEUTROPHILS # BLD AUTO: 2.34 X10(3) UL (ref 1.5–7.7)
NEUTROPHILS NFR BLD AUTO: 45.6 %
NONHDLC SERPL-MCNC: 126 MG/DL (ref ?–130)
OSMOLALITY SERPL CALC.SUM OF ELEC: 289 MOSM/KG (ref 275–295)
PLATELET # BLD AUTO: 313 10(3)UL (ref 150–450)
POTASSIUM SERPL-SCNC: 3.7 MMOL/L (ref 3.5–5.1)
PROT SERPL-MCNC: 8.4 G/DL (ref 6.4–8.2)
RBC # BLD AUTO: 4.29 X10(6)UL
SODIUM SERPL-SCNC: 140 MMOL/L (ref 136–145)
TRIGL SERPL-MCNC: 97 MG/DL (ref 30–149)
VLDLC SERPL CALC-MCNC: 16 MG/DL (ref 0–30)
WBC # BLD AUTO: 5.1 X10(3) UL (ref 4–11)

## 2023-10-06 PROCEDURE — 3079F DIAST BP 80-89 MM HG: CPT | Performed by: FAMILY MEDICINE

## 2023-10-06 PROCEDURE — 99213 OFFICE O/P EST LOW 20 MIN: CPT | Performed by: FAMILY MEDICINE

## 2023-10-06 PROCEDURE — 83036 HEMOGLOBIN GLYCOSYLATED A1C: CPT

## 2023-10-06 PROCEDURE — 85025 COMPLETE CBC W/AUTO DIFF WBC: CPT

## 2023-10-06 PROCEDURE — 90471 IMMUNIZATION ADMIN: CPT | Performed by: FAMILY MEDICINE

## 2023-10-06 PROCEDURE — 80053 COMPREHEN METABOLIC PANEL: CPT

## 2023-10-06 PROCEDURE — 36415 COLL VENOUS BLD VENIPUNCTURE: CPT

## 2023-10-06 PROCEDURE — 3074F SYST BP LT 130 MM HG: CPT | Performed by: FAMILY MEDICINE

## 2023-10-06 PROCEDURE — 80061 LIPID PANEL: CPT

## 2023-10-06 PROCEDURE — 82043 UR ALBUMIN QUANTITATIVE: CPT

## 2023-10-06 PROCEDURE — 84165 PROTEIN E-PHORESIS SERUM: CPT

## 2023-10-06 PROCEDURE — 86803 HEPATITIS C AB TEST: CPT

## 2023-10-06 PROCEDURE — 82570 ASSAY OF URINE CREATININE: CPT

## 2023-10-06 PROCEDURE — 3008F BODY MASS INDEX DOCD: CPT | Performed by: FAMILY MEDICINE

## 2023-10-06 PROCEDURE — 90686 IIV4 VACC NO PRSV 0.5 ML IM: CPT | Performed by: FAMILY MEDICINE

## 2023-10-07 ENCOUNTER — PATIENT MESSAGE (OUTPATIENT)
Facility: CLINIC | Age: 33
End: 2023-10-07

## 2023-10-07 DIAGNOSIS — R77.1 ELEVATED SERUM GLOBULIN LEVEL: Primary | ICD-10-CM

## 2023-10-09 NOTE — TELEPHONE ENCOUNTER
Dr. Cecy Hreron,  Please see [employer] message. Patient has additional questions regarding lab results from 10/6/2023.

## 2023-10-10 LAB
ALBUMIN SERPL ELPH-MCNC: 4.14 G/DL (ref 3.75–5.21)
ALBUMIN/GLOB SERPL: 1.1 {RATIO} (ref 1–2)
ALPHA1 GLOB SERPL ELPH-MCNC: 0.36 G/DL (ref 0.19–0.46)
ALPHA2 GLOB SERPL ELPH-MCNC: 0.71 G/DL (ref 0.48–1.05)
B-GLOBULIN SERPL ELPH-MCNC: 1.02 G/DL (ref 0.68–1.23)
GAMMA GLOB SERPL ELPH-MCNC: 1.67 G/DL (ref 0.62–1.7)
PROT SERPL-MCNC: 7.9 G/DL (ref 6.4–8.2)

## 2023-10-23 ENCOUNTER — LAB ENCOUNTER (OUTPATIENT)
Dept: LAB | Age: 33
End: 2023-10-23
Attending: FAMILY MEDICINE

## 2023-10-23 ENCOUNTER — IMMUNIZATION (OUTPATIENT)
Dept: LAB | Age: 33
End: 2023-10-23
Attending: FAMILY MEDICINE
Payer: COMMERCIAL

## 2023-10-23 DIAGNOSIS — Z23 NEED FOR VACCINATION: Primary | ICD-10-CM

## 2023-10-23 DIAGNOSIS — R77.1 ELEVATED SERUM GLOBULIN LEVEL: ICD-10-CM

## 2023-10-23 PROCEDURE — 90480 ADMN SARSCOV2 VAC 1/ONLY CMP: CPT

## 2023-10-23 PROCEDURE — 36415 COLL VENOUS BLD VENIPUNCTURE: CPT

## 2023-10-23 PROCEDURE — 86334 IMMUNOFIX E-PHORESIS SERUM: CPT

## 2023-11-07 ENCOUNTER — TELEPHONE (OUTPATIENT)
Facility: CLINIC | Age: 33
End: 2023-11-07

## 2023-11-07 NOTE — TELEPHONE ENCOUNTER
Pt is calling requesting to switch her appt with her mother(([phone]) for a medicare visit. Please assist.

## 2023-11-20 RX ORDER — NIFEDIPINE 60 MG/1
60 TABLET, FILM COATED, EXTENDED RELEASE ORAL DAILY
Qty: 90 TABLET | Refills: 3 | Status: SHIPPED | OUTPATIENT
Start: 2023-11-20

## 2023-11-20 NOTE — TELEPHONE ENCOUNTER
Refill passed per [facility] protocol.   Requested Prescriptions   Pending Prescriptions Disp Refills    NIFEDIPINE ER 60 MG Oral Tablet 24 Hr [Pharmacy Med Name: NIFEDIPINE 60MG ER (CC) TABLETS] 90 tablet 0     Sig: TAKE 1 TABLET(60 MG) BY MOUTH DAILY       Hypertensive Medications Protocol Passed - 11/18/2023  7:59 AM        Passed - In person appointment in the past 12 or next 3 months     Recent Outpatient Visits              1 month ago Calcium channel blocker overdose, accidental or unintentional, initial encounter    [address] [place], [place]    Office Visit    9 months ago Arthralgia of temporomandibular joint, unspecified laterality    [facility], Jamaal Michel DO    Office Visit    11 months ago Acute cough    [facility], [place], Marlis Jaeger., APRN    Office Visit    1 year ago Encounter for routine adult health examination without abnormal findings    [facility] [address], [place]    Office Visit    1 year ago Need for influenza vaccination    [address], [facility]    Nurse Only                      Passed - Last BP reading less than 140/90     BP Readings from Last 1 Encounters:   10/06/23 124/84               Passed - CMP or BMP in past 6 months     Recent Results (from the past 4392 hour(s))   Comp Metabolic Panel (14) [E]    Collection Time: 10/06/23  1:13 PM   Result Value Ref Range    Glucose 93 70 - 99 mg/dL    Sodium 140 136 - 145 mmol/L    Potassium 3.7 3.5 - 5.1 mmol/L    Chloride 106 98 - 112 mmol/L    CO2 27.0 21.0 - 32.0 mmol/L    Anion Gap 7 0 - 18 mmol/L    BUN 11 7 - 18 mg/dL    Creatinine 0.75 0.55 - 1.02 mg/dL    BUN/CREA Ratio 14.7 10.0 - 20.0    Calcium, Total 9.0 8.5 - 10.1 mg/dL    Calculated Osmolality 289 275 - 295 mOsm/kg    eGFR-Cr 108 >=60 mL/min/1.73m2    ALT 16 13 - 56 U/L    AST 16 15 - 37 U/L    Alkaline Phosphatase 64 37 - 98 U/L    Bilirubin, Total 0.4 0.1 - 2.0 mg/dL    Total Protein 8.4 (H) 6.4 - 8.2 g/dL    Albumin 3.6 3.4 - 5.0 g/dL    Globulin  4.8 (H) 2.8 - 4.4 g/dL    A/G Ratio 0.8 (L) 1.0 - 2.0    Patient Fasting for CMP? No      *Note: Due to a large number of results and/or encounters for the requested time period, some results have not been displayed. A complete set of results can be found in Results Review.                Passed - In person appointment or virtual visit in the past 6 months     Recent Outpatient Visits              1 month ago Calcium channel blocker overdose, accidental or unintentional, initial encounter    [address], [address], [address] [place], [place]    Office Visit    9 months ago Arthralgia of temporomandibular joint, unspecified laterality    [facility], [place], Sebastien Gould, DO    Office Visit    11 months ago Acute cough    [facility], [address], [place], Sigmund Boast., APRN    Office Visit    1 year ago Encounter for routine adult health examination without abnormal findings    [facility], [address] [place] [address]    Office Visit    1 year ago Need for influenza vaccination    [address] [facility], [facility]    Nurse Only                      Passed - EGFRCR or GFRAA > 50     GFR Evaluation  EGFRCR: 108 , resulted on 10/6/2023             Recent Outpatient Visits              1 month ago Calcium channel blocker overdose, accidental or unintentional, initial encounter    [address], [address], [address] [place], [place]    Office Visit    9 months ago Arthralgia of temporomandibular joint, unspecified laterality    [facility], [address], [place], Sebastien Gould, DO    Office Visit    11 months ago Acute cough    [facility] [address], [place] INGRID [facility]ON    Office Visit    1 year ago Encounter for routine adult health examination without abnormal findings    [address] [facility], [address] [place] [address]    Office Visit    1 year ago Need for influenza vaccination    [address] [facility], [facility]    Nurse Only

## 2023-11-21 ENCOUNTER — OFFICE VISIT (OUTPATIENT)
Dept: INTERNAL MEDICINE CLINIC | Facility: CLINIC | Age: 33
End: 2023-11-21

## 2023-11-21 ENCOUNTER — TELEPHONE (OUTPATIENT)
Dept: INTERNAL MEDICINE CLINIC | Facility: CLINIC | Age: 33
End: 2023-11-21

## 2023-11-21 VITALS
WEIGHT: 151 LBS | DIASTOLIC BLOOD PRESSURE: 86 MMHG | HEIGHT: 67 IN | BODY MASS INDEX: 23.7 KG/M2 | HEART RATE: 94 BPM | OXYGEN SATURATION: 97 % | SYSTOLIC BLOOD PRESSURE: 135 MMHG

## 2023-11-21 DIAGNOSIS — B34.9 ACUTE VIRAL SYNDROME: Primary | ICD-10-CM

## 2023-11-21 PROCEDURE — 99213 OFFICE O/P EST LOW 20 MIN: CPT | Performed by: NURSE PRACTITIONER

## 2023-11-21 PROCEDURE — 3079F DIAST BP 80-89 MM HG: CPT | Performed by: NURSE PRACTITIONER

## 2023-11-21 PROCEDURE — 3075F SYST BP GE 130 - 139MM HG: CPT | Performed by: NURSE PRACTITIONER

## 2023-11-21 PROCEDURE — 3008F BODY MASS INDEX DOCD: CPT | Performed by: NURSE PRACTITIONER

## 2023-11-21 RX ORDER — ECHINACEA PURPUREA EXTRACT 125 MG
2 TABLET ORAL AS NEEDED
Qty: 60 ML | Refills: 0 | Status: SHIPPED | OUTPATIENT
Start: 2023-11-21

## 2023-11-21 RX ORDER — FLUTICASONE PROPIONATE 50 MCG
2 SPRAY, SUSPENSION (ML) NASAL DAILY
Qty: 15.8 ML | Refills: 0 | Status: SHIPPED | OUTPATIENT
Start: 2023-11-21 | End: 2024-11-15

## 2023-11-21 RX ORDER — BENZONATATE 200 MG/1
200 CAPSULE ORAL 3 TIMES DAILY PRN
Qty: 30 CAPSULE | Refills: 0 | Status: SHIPPED | OUTPATIENT
Start: 2023-11-21

## 2023-11-21 NOTE — PATIENT INSTRUCTIONS
Nasal saline 2 sprays in each nostril every 3-4 hours as needed. Flonase 1-2 sprays in each nostril once daily. Tylenol 500 mg every 6-8 hours as needed (max dose 3000 mg/day).    Hydrate, humidifier, rest, honey/elderberry (zarbees or other)

## 2023-11-21 NOTE — TELEPHONE ENCOUNTER
fluticasone propionate 50 MCG/ACT Nasal Suspension, 2 sprays by Each Nare route daily. , Disp: 15.8 mL, Rfl: 0

## 2024-02-13 NOTE — TELEPHONE ENCOUNTER
Amira Camacho is asking if Dr. Barry Teran also wanted to add CPT code 76817-Trans Vag to ultrasound order.     Existing order is only Trans Abdominal.  Please update if necessary  Patient scheduled for today (11/13) at 4:00pm Dr. Gastelum made aware of patient level 8/10 . Orders received.    Pt. To xray via stretcher and RN,returned post xray w said same transportation.

## 2024-02-17 ENCOUNTER — HOSPITAL ENCOUNTER (OUTPATIENT)
Age: 34
Discharge: HOME | End: 2024-02-17
Attending: EMERGENCY MEDICINE
Payer: COMMERCIAL

## 2024-02-17 VITALS
HEART RATE: 70 BPM | TEMPERATURE: 98 F | OXYGEN SATURATION: 100 % | DIASTOLIC BLOOD PRESSURE: 75 MMHG | RESPIRATION RATE: 20 BRPM | SYSTOLIC BLOOD PRESSURE: 132 MMHG

## 2024-02-17 DIAGNOSIS — N30.01 ACUTE CYSTITIS WITH HEMATURIA: Primary | ICD-10-CM

## 2024-02-17 LAB
B-HCG UR QL: NEGATIVE
BILIRUB UR QL STRIP: NEGATIVE
CLARITY UR: CLEAR
COLOR UR: YELLOW
GLUCOSE UR STRIP-MCNC: NEGATIVE MG/DL
KETONES UR STRIP-MCNC: NEGATIVE MG/DL
NITRITE UR QL STRIP: NEGATIVE
PH UR STRIP: 6.5 [PH]
PROT UR STRIP-MCNC: NEGATIVE MG/DL
SP GR UR STRIP: 1.02
UROBILINOGEN UR STRIP-ACNC: <2 MG/DL

## 2024-02-17 PROCEDURE — 99214 OFFICE O/P EST MOD 30 MIN: CPT

## 2024-02-17 PROCEDURE — 87086 URINE CULTURE/COLONY COUNT: CPT | Performed by: EMERGENCY MEDICINE

## 2024-02-17 PROCEDURE — 81025 URINE PREGNANCY TEST: CPT

## 2024-02-17 PROCEDURE — 81002 URINALYSIS NONAUTO W/O SCOPE: CPT

## 2024-02-17 RX ORDER — CEPHALEXIN 500 MG/1
500 CAPSULE ORAL 3 TIMES DAILY
Qty: 30 CAPSULE | Refills: 0 | Status: SHIPPED | OUTPATIENT
Start: 2024-02-17 | End: 2024-02-27

## 2024-02-17 NOTE — ED PROVIDER NOTES
Patient Seen in: [facility] [place]      History     Chief Complaint   Patient presents with    Urinary Symptoms     Stated Complaint: urinary symptoms    Subjective:   HPI    The patient is a 33-year-old female with past history of hypertension, previous UTI who presents now with urinary symptoms.  The patient states she noted some minimal low back pain yesterday.  Patient now is experiencing urinary frequency and urgency.  Patient denies any significant abdominal pain.  The patient denies any fever.  The patient states she has had a UTI in the past that \"felt similar\"    Objective:   Past Medical History:   Diagnosis Date    Allergic rhinitis     Anemia     Essential hypertension 2018    Postpartum hemorrhage     Varicella     Had chicken pox during childhood.              Past Surgical History:   Procedure Laterality Date    EACH ADD TOOTH EXTRACTION      no associated bleeding complications      2021                No pertinent social history.            Review of Systems    Positive for stated complaint: urinary symptoms  Other systems are as noted in HPI.  Constitutional and vital signs reviewed.      All other systems reviewed and negative except as noted above.    Physical Exam     ED Triage Vitals [24 0843]   /75   Pulse 70   Resp 20   Temp 98 °F (36.7 °C)   Temp src Temporal   SpO2 100 %   O2 Device None (Room air)       Current:/75   Pulse 70   Temp 98 °F (36.7 °C) (Temporal)   Resp 20   LMP 2024 (Exact Date)   SpO2 100%         Physical Exam    Constitutional: Well-developed well-nourished in no acute distress  Head: Normocephalic, no swelling or tenderness  Eyes: Nonicteric sclera, no conjunctival injection  ENT: TMs are clear and flat bilaterally.  There is no posterior pharyngeal erythema  Chest: Clear to auscultation, no tenderness  Cardiovascular: Regular rate and rhythm without murmur  Abdomen: Soft, nontender and nondistended  Back: No CVA  tenderness  Neurologic: Patient is awake, alert and oriented ×3.  The patient's motor strength is 5 out of 5 and symmetric in the upper and lower extremities bilaterally  Extremities: No focal swelling or tenderness  Skin: No pallor, no redness or warmth to the touch      ED Course     Labs Reviewed   [facility] POCT URINALYSIS DIPSTICK - Abnormal; Notable for the following components:       Result Value    Blood, Urine Trace-Intact (*)     Leukocyte esterase urine Small (*)     All other components within normal limits   POCT PREGNANCY URINE - Normal   URINE CULTURE, ROUTINE             Patient's previous urine culture from 3/23 was reviewed.  Patient grew out E. coli at that time that was resistant to Bactrim and ampicillin.  Patient was treated with a cephalosporin at that time and did well.  Patient's UTI from last year was sensitive to Keflex.  Will initiate Keflex and send for culture.         MDM      Cystitis versus pyelonephritis                                   Medical Decision Making      Disposition and Plan     Clinical Impression:  1. Acute cystitis with hematuria         Disposition:  Discharge  2/17/2024  9:01 am    Follow-up:  Lady Thakkar DO  [address] [address] [phone]    In 3 days  If symptoms worsen          Medications Prescribed:  Discharge Medication List as of 2/17/2024  9:15 AM        START taking these medications    Details   cephalexin 500 MG Oral Cap Take 1 capsule (500 mg total) by mouth 3 (three) times daily for 10 days., Normal, Disp-30 capsule, R-0

## 2024-02-17 NOTE — ED INITIAL ASSESSMENT (HPI)
Pt c/o urinary frequency and urgency since this morning, low back pain x 1 day. Denies fever, chills, or abdominal pain

## 2024-03-16 ENCOUNTER — HOSPITAL ENCOUNTER (OUTPATIENT)
Age: 34
Discharge: HOME | End: 2024-03-16
Payer: COMMERCIAL

## 2024-03-16 VITALS
RESPIRATION RATE: 18 BRPM | SYSTOLIC BLOOD PRESSURE: 140 MMHG | HEART RATE: 66 BPM | DIASTOLIC BLOOD PRESSURE: 90 MMHG | TEMPERATURE: 99 F | OXYGEN SATURATION: 100 %

## 2024-03-16 DIAGNOSIS — K52.9 GASTROENTERITIS: Primary | ICD-10-CM

## 2024-03-16 PROCEDURE — 96361 HYDRATE IV INFUSION ADD-ON: CPT

## 2024-03-16 PROCEDURE — 99214 OFFICE O/P EST MOD 30 MIN: CPT

## 2024-03-16 PROCEDURE — 96374 THER/PROPH/DIAG INJ IV PUSH: CPT

## 2024-03-16 RX ORDER — ONDANSETRON 4 MG/1
4 TABLET, ORALLY DISINTEGRATING ORAL EVERY 4 HOURS PRN
Qty: 10 TABLET | Refills: 0 | Status: SHIPPED | OUTPATIENT
Start: 2024-03-16 | End: 2024-03-23

## 2024-03-16 RX ORDER — ONDANSETRON 2 MG/ML
4 INJECTION INTRAMUSCULAR; INTRAVENOUS ONCE
Status: COMPLETED | OUTPATIENT
Start: 2024-03-16 | End: 2024-03-16

## 2024-03-16 NOTE — ED PROVIDER NOTES
Patient Seen in: [facility] [place]      History     Chief Complaint   Patient presents with    Nausea/Vomiting/Diarrhea     Stated Complaint: abdominal pain, fatigue, vomitting, diarrhea    Subjective:   HPI    33-year-old female with history of hypertension presents for evaluation of nausea, vomiting and diarrhea.  Symptom onset overnight.  Multiple episodes of loose watery stool today.  She denies any black or tarry stool.  Patient's daughter had similar symptoms a few days ago.    Objective:   Past Medical History:   Diagnosis Date    Allergic rhinitis     Anemia     Essential hypertension 2018    Postpartum hemorrhage (HCC)     Varicella     Had chicken pox during childhood.              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 She has no children. Patient works as a . She and her  live in [place], [place]              Review of Systems    Positive for stated complaint: abdominal pain, fatigue, vomitting, diarrhea  Other systems are as noted in HPI.  Constitutional and vital signs reviewed.      All other systems reviewed and negative except as noted above.    Physical Exam     ED Triage Vitals [24 1440]   BP (!) 134/98   Pulse 66   Resp 18   Temp 98.5  °F (36.9 °C)   Temp src Temporal   SpO2 100 %   O2 Device None (Room air)       Current:BP (!) 134/98   Pulse 66   Temp 98.5 °F (36.9 °C) (Temporal)   Resp 18   LMP 01/21/2024 (Exact Date)   SpO2 100%         Physical Exam  Vitals and nursing note reviewed.   Constitutional:       General: She is not in acute distress.  HENT:      Head: Normocephalic and atraumatic.      Right Ear: External ear normal.      Left Ear: External ear normal.      Nose: Nose normal.      Mouth/Throat:      Mouth: Mucous membranes are moist.   Eyes:      Extraocular Movements: Extraocular movements intact.      Pupils: Pupils are equal, round, and reactive to light.   Cardiovascular:      Rate and Rhythm: Normal rate.   Pulmonary:      Effort: Pulmonary effort is normal.   Abdominal:      General: Abdomen is flat. There is no distension.      Tenderness: There is no abdominal tenderness.   Musculoskeletal:         General: Normal range of motion.      Cervical back: Normal range of motion.   Skin:     General: Skin is warm.   Neurological:      General: No focal deficit present.      Mental Status: She is alert and oriented to person, place, and time.   Psychiatric:         Mood and Affect: Mood normal.         Behavior: Behavior normal.               ED Course   Labs Reviewed - No data to display     33-year-old female presenting for evaluation of nausea, vomiting and diarrhea.  Daughter has the stomach flu.  Patient is afebrile.  The abdomen is nondistended, nontender.    Ddx-gastroenteritis, foodborne illness, appendicitis  Plan-Zofran, fluids   Patient notes symptomatic improvement after fluids, Zofran.  She is tolerating sips of water and saltine crackers.  Discussed supportive care and return precautions.           [facility]                                         Medical Decision Making      Disposition and Plan     Clinical Impression:  1. Gastroenteritis         Disposition:  Discharge  3/16/2024  3:32 pm    Follow-up:  Bijan  DO Lady  [address] [address]  [phone]                Medications Prescribed:  Current Discharge Medication List        START taking these medications    Details   ondansetron 4 MG Oral Tablet Dispersible Take 1 tablet (4 mg total) by mouth every 4 (four) hours as needed for Nausea.  Qty: 10 tablet, Refills: 0

## 2024-03-16 NOTE — ED INITIAL ASSESSMENT (HPI)
N/v/d since last night with abd cramps, no fever, fatigued but no uri /uti symptoms, denies recent travel,  + sick contact

## 2024-07-17 NOTE — TELEPHONE ENCOUNTER
Patient is having bilateral ear pain and would like to set up a tele visit with Dr Karen Bray, patient is afebrile and is sequesting at home but she is concerned and would like to confer with the doctor, She would also like a med refill, RN has pended.     RN has No

## 2024-10-11 ENCOUNTER — IMMUNIZATION (OUTPATIENT)
Dept: LAB | Age: 34
End: 2024-10-11
Attending: EMERGENCY MEDICINE
Payer: COMMERCIAL

## 2024-10-11 DIAGNOSIS — Z23 NEED FOR VACCINATION: Primary | ICD-10-CM

## 2024-10-11 PROCEDURE — 90656 IIV3 VACC NO PRSV 0.5 ML IM: CPT

## 2024-10-11 PROCEDURE — 90480 ADMN SARSCOV2 VAC 1/ONLY CMP: CPT

## 2024-10-11 PROCEDURE — 90471 IMMUNIZATION ADMIN: CPT

## 2024-10-14 ENCOUNTER — OFFICE VISIT (OUTPATIENT)
Facility: CLINIC | Age: 34
End: 2024-10-14
Payer: COMMERCIAL

## 2024-10-14 ENCOUNTER — LAB ENCOUNTER (OUTPATIENT)
Dept: LAB | Facility: REFERENCE LAB | Age: 34
End: 2024-10-14
Attending: FAMILY MEDICINE
Payer: COMMERCIAL

## 2024-10-14 VITALS
OXYGEN SATURATION: 96 % | WEIGHT: 154 LBS | DIASTOLIC BLOOD PRESSURE: 86 MMHG | BODY MASS INDEX: 24.17 KG/M2 | SYSTOLIC BLOOD PRESSURE: 120 MMHG | HEART RATE: 92 BPM | HEIGHT: 67 IN

## 2024-10-14 DIAGNOSIS — Z12.4 PAP SMEAR FOR CERVICAL CANCER SCREENING: ICD-10-CM

## 2024-10-14 DIAGNOSIS — I10 HYPERTENSION WITH GOAL BLOOD PRESSURE LESS THAN 130/80: ICD-10-CM

## 2024-10-14 DIAGNOSIS — Z00.00 ENCOUNTER FOR ROUTINE ADULT HEALTH EXAMINATION WITHOUT ABNORMAL FINDINGS: ICD-10-CM

## 2024-10-14 DIAGNOSIS — Z80.3 FAMILY HISTORY OF BREAST CANCER: ICD-10-CM

## 2024-10-14 DIAGNOSIS — Z00.00 ENCOUNTER FOR ROUTINE ADULT HEALTH EXAMINATION WITHOUT ABNORMAL FINDINGS: Primary | ICD-10-CM

## 2024-10-14 PROBLEM — T46.1X1A: Status: RESOLVED | Noted: 2023-09-22 | Resolved: 2024-10-14

## 2024-10-14 LAB
ALBUMIN SERPL-MCNC: 4.3 G/DL (ref 3.2–4.8)
ALBUMIN/GLOB SERPL: 1.3 {RATIO} (ref 1–2)
ALP LIVER SERPL-CCNC: 69 U/L
ALT SERPL-CCNC: 13 U/L
ANION GAP SERPL CALC-SCNC: 7 MMOL/L (ref 0–18)
AST SERPL-CCNC: 19 U/L (ref ?–34)
BASOPHILS # BLD AUTO: 0.02 X10(3) UL (ref 0–0.2)
BASOPHILS NFR BLD AUTO: 0.5 %
BILIRUB SERPL-MCNC: 0.4 MG/DL (ref 0.3–1.2)
BUN BLD-MCNC: 12 MG/DL (ref 9–23)
BUN/CREAT SERPL: 15.8 (ref 10–20)
CALCIUM BLD-MCNC: 9.2 MG/DL (ref 8.7–10.4)
CHLORIDE SERPL-SCNC: 106 MMOL/L (ref 98–112)
CHOLEST SERPL-MCNC: 192 MG/DL (ref ?–200)
CO2 SERPL-SCNC: 28 MMOL/L (ref 21–32)
CREAT BLD-MCNC: 0.76 MG/DL
CREAT UR-SCNC: 31.1 MG/DL
DEPRECATED RDW RBC AUTO: 38.5 FL (ref 35.1–46.3)
EGFRCR SERPLBLD CKD-EPI 2021: 106 ML/MIN/1.73M2 (ref 60–?)
EOSINOPHIL # BLD AUTO: 0.2 X10(3) UL (ref 0–0.7)
EOSINOPHIL NFR BLD AUTO: 5 %
ERYTHROCYTE [DISTWIDTH] IN BLOOD BY AUTOMATED COUNT: 12.3 % (ref 11–15)
EST. AVERAGE GLUCOSE BLD GHB EST-MCNC: 105 MG/DL (ref 68–126)
FASTING PATIENT LIPID ANSWER: NO
FASTING STATUS PATIENT QL REPORTED: NO
GLOBULIN PLAS-MCNC: 3.4 G/DL (ref 2–3.5)
GLUCOSE BLD-MCNC: 94 MG/DL (ref 70–99)
HBA1C MFR BLD: 5.3 % (ref ?–5.7)
HCT VFR BLD AUTO: 36.9 %
HDLC SERPL-MCNC: 51 MG/DL (ref 40–59)
HGB BLD-MCNC: 12.1 G/DL
IMM GRANULOCYTES # BLD AUTO: 0.01 X10(3) UL (ref 0–1)
IMM GRANULOCYTES NFR BLD: 0.2 %
LDLC SERPL CALC-MCNC: 124 MG/DL (ref ?–100)
LYMPHOCYTES # BLD AUTO: 1.52 X10(3) UL (ref 1–4)
LYMPHOCYTES NFR BLD AUTO: 37.9 %
MCH RBC QN AUTO: 28.3 PG (ref 26–34)
MCHC RBC AUTO-ENTMCNC: 32.8 G/DL (ref 31–37)
MCV RBC AUTO: 86.2 FL
MICROALBUMIN UR-MCNC: <0.3 MG/DL
MONOCYTES # BLD AUTO: 0.53 X10(3) UL (ref 0.1–1)
MONOCYTES NFR BLD AUTO: 13.2 %
NEUTROPHILS # BLD AUTO: 1.73 X10 (3) UL (ref 1.5–7.7)
NEUTROPHILS # BLD AUTO: 1.73 X10(3) UL (ref 1.5–7.7)
NEUTROPHILS NFR BLD AUTO: 43.2 %
NONHDLC SERPL-MCNC: 141 MG/DL (ref ?–130)
OSMOLALITY SERPL CALC.SUM OF ELEC: 292 MOSM/KG (ref 275–295)
PLATELET # BLD AUTO: 266 10(3)UL (ref 150–450)
POTASSIUM SERPL-SCNC: 3.5 MMOL/L (ref 3.5–5.1)
PROT SERPL-MCNC: 7.7 G/DL (ref 5.7–8.2)
RBC # BLD AUTO: 4.28 X10(6)UL
SODIUM SERPL-SCNC: 141 MMOL/L (ref 136–145)
TRIGL SERPL-MCNC: 91 MG/DL (ref 30–149)
VLDLC SERPL CALC-MCNC: 16 MG/DL (ref 0–30)
WBC # BLD AUTO: 4 X10(3) UL (ref 4–11)

## 2024-10-14 PROCEDURE — 36415 COLL VENOUS BLD VENIPUNCTURE: CPT

## 2024-10-14 PROCEDURE — 83036 HEMOGLOBIN GLYCOSYLATED A1C: CPT

## 2024-10-14 PROCEDURE — 80053 COMPREHEN METABOLIC PANEL: CPT

## 2024-10-14 PROCEDURE — 80061 LIPID PANEL: CPT

## 2024-10-14 PROCEDURE — 87624 HPV HI-RISK TYP POOLED RSLT: CPT | Performed by: FAMILY MEDICINE

## 2024-10-14 PROCEDURE — 85025 COMPLETE CBC W/AUTO DIFF WBC: CPT

## 2024-10-14 PROCEDURE — 88175 CYTOPATH C/V AUTO FLUID REDO: CPT | Performed by: FAMILY MEDICINE

## 2024-10-14 PROCEDURE — 82570 ASSAY OF URINE CREATININE: CPT

## 2024-10-14 PROCEDURE — 82043 UR ALBUMIN QUANTITATIVE: CPT

## 2024-10-14 NOTE — PROGRESS NOTES
HPI:   Kang Szymanski is a 33 year old female who presents for a complete physical exam.     Her blood pressure has been well controlled overall, but was running a little high last night. Takes her Nifedipine 60 mg daily.     Last pap: 2021 and normal   Last mammogram: Never had one before    Menses: Regular, monthly cycles   Contraception:  Pull-out method    History of STD's: None  History of intimate partner violence: None  Family hx of breast, ovarian, cervical or colon CA: Mom and maternal aunt with breast cancer   Diet and exercise: Eats a healthy, well balanced diet overall. Trying to get a lot of fruits and vegetables in her diet. Avoids fried foods as much as possible. Trying to limit sweets as she has a sweet tooth. Eats lean meats, and no beef or pork. Drinking more water lately. Has been exercising 3-4 days a week. Also doing more cardio.   Immunizations:  Tdap: , Flu: 10/11/2024, Covid: Completed     REVIEW OF SYSTEMS:   GENERAL: feels well otherwise   SKIN: denies any unusual skin lesions  EYES: no vision problems  BREAST: no lumps or masses, no nipple discharge   LUNGS: denies shortness of breath  CARDIOVASCULAR: denies chest pain  GI: denies abdominal pain,  No constipation or diarrhea, no hematochezia  : denies dysuria, vaginal discharge or itching  NEURO: denies headaches  PSYCHE: denies depression or anxiety          Current Outpatient Medications   Medication Sig Dispense Refill    NIFEdipine ER 60 MG Oral Tablet 24 Hr Take 1 tablet (60 mg total) by mouth daily. 90 tablet 3     Allergies[1]   Past Medical History:    Allergic rhinitis    Anemia    Essential hypertension    Postpartum hemorrhage (HCC)    Varicella    Had chicken pox during childhood.      Past Surgical History:   Procedure Laterality Date    Each add tooth extraction      no associated bleeding complications      2021      Family History   Problem Relation Age of Onset    Hypertension Mother          High blood pressure    Stroke Mother         Stroke    Breast Cancer Mother 69    Cancer Mother         Stage 0-1 Breast cancer (controlled with medication)    Hypertension Maternal Grandmother         High blood pressure    Cancer Maternal Grandmother         Lung cancer from smoking    Hypertension Maternal Grandfather         High blood pressure    Stroke Maternal Grandfather         Stroke    Diabetes Maternal Uncle     Heart Disorder Maternal Uncle     Heart Disease Maternal Uncle         status-post bypass    Hypertension Brother     Breast Cancer Maternal Aunt 67    Glaucoma Neg     Macular degeneration Neg     Bleeding Disorders Neg     Sickle Cell Neg     DVT/VTE Neg     Prostate Cancer Neg     Ovarian Cancer Neg     Pancreatic Cancer Neg       Social History:   Social History     Socioeconomic History    Marital status:    Occupational History    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 She has no children. Patient works as a . 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 : Yes. Children: 1 daughter.         EXAM:     Wt Readings from Last 6 Encounters:   10/14/24 154 lb (69.9 kg)   11/21/23 151 lb (68.5 kg)   10/06/23 152 lb (68.9 kg)   09/22/23 143 lb 8.3 oz (65.1 kg)   02/07/23 151 lb (68.5 kg)   12/13/22 149 lb (67.6 kg)     Body mass index is 24.12 kg/m².   /86 (BP Location: Right arm)   Pulse 92   Ht 5' 7\" (1.702 m)   Wt 154 lb (69.9  kg)   LMP 09/30/2024 (Exact Date)   SpO2 96%   BMI 24.12 kg/m²     A medical chaperone was offered, but patient declined.    GENERAL: well developed, well nourished, in no apparent distress   SKIN: no rashes, no suspicious lesions  HEENT: atraumatic, normocephalic, throat clear; normal dentition  EYES: PERRLA, EOMI, conjunctiva are clear  NECK: supple, no adenopathy or thyroid masses   BREAST: no dominant or suspicious mass, no nipple discharge  LUNGS: clear to auscultation  CARDIO: RRR without murmur  GI: good bowel sounds, no masses, HSM or tenderness  : introitus is normal, scant discharge, cervix is pink, no adnexal or uterine masses or tenderness   EXTREMITIES: no edema    Cholesterol, Total (mg/dL)   Date Value   10/06/2023 184   10/17/2018 155     HDL Cholesterol (mg/dL)   Date Value   10/06/2023 58   10/17/2018 49     LDL Cholesterol (mg/dL)   Date Value   10/06/2023 108 (H)   10/17/2018 95      ASSESSMENT AND PLAN:   Kang Szymanski is a 33 year old female who presents for a complete physical exam.  Encounter Diagnoses   Name Primary?    Encounter for routine adult health examination without abnormal findings Yes    Pap smear for cervical cancer screening     Hypertension with goal blood pressure less than 130/80     Family history of breast cancer      Orders Placed This Encounter   Procedures    CBC With Differential With Platelet    Comp Metabolic Panel (14)    Hemoglobin A1C    Hpv High Risk , Thin Prep Collect    Lipid Panel    Microalb/Creat Ratio, Random Urine [E]    ThinPrep PAP Smear B     Blood pressure improved upon repeat check, and will continue nifedipine 60 mg daily.  Continue to check blood pressure at home and notify me if any high readings.  Will also check baseline labs and urine screening today.  Referral to genetic counselor given due to family history of breast cancer.     Discussed with patient the following:  -Monthly breast self-exam  -Breast cancer  screening/mammograms and clinical breast exams  -Cervical cancer screening/pap smears  -Colon cancer screening/colonoscopy  -Adequate calcium and Vitamin D intake to prevent osteoporosis  -Healthy diet including adequate intake of vegetables and fruits, appropriate portion sizes, minimizing highly concentrated carbohydrate foods  -Exercising 30 minutes a day most days of the week   -Diabetes screening which she desires  -Cholesterol screening which she desires  -Recommendation for yearly influenza vaccine  -Need for Tdap once as an adult and Td booster every 10 years  -Need for Zoster vaccine for patients >= 50  -Contraception: Pull-out method  -STI screening (GC/Chlamydia/HIV): Not indicated  -Hepatitis C screening for all adults between the ages of 18 and 79: Checked and negative       All questions were answered during the visit and the patient verbalizes understanding. She will return in one year for next WWE or sooner as needed.    Meds & Refills for this Visit:  Requested Prescriptions      No prescriptions requested or ordered in this encounter       Imaging & Consults:  OP REFERRAL TO GENETIC COUNSELOR    Lady Thakkar DO  10/14/2024  9:16 AM         [1]   Allergies  Allergen Reactions    Hazelnuts SWELLING    Penicillins     Shellfish-Derived Products ANAPHYLAXIS    Seasonal Coughing

## 2024-10-15 ENCOUNTER — PATIENT MESSAGE (OUTPATIENT)
Facility: CLINIC | Age: 34
End: 2024-10-15

## 2024-10-15 LAB — HPV E6+E7 MRNA CVX QL NAA+PROBE: NEGATIVE

## 2024-10-16 LAB
LAST PAP RESULT: NORMAL
PAP HISTORY (OTHER THAN LAST PAP): NORMAL

## 2024-10-17 ENCOUNTER — HOSPITAL ENCOUNTER (EMERGENCY)
Facility: HOSPITAL | Age: 34
Discharge: HOME | End: 2024-10-17
Attending: EMERGENCY MEDICINE
Payer: COMMERCIAL

## 2024-10-17 VITALS
OXYGEN SATURATION: 98 % | HEIGHT: 67 IN | HEART RATE: 72 BPM | BODY MASS INDEX: 22.44 KG/M2 | RESPIRATION RATE: 17 BRPM | WEIGHT: 143 LBS | TEMPERATURE: 99 F | SYSTOLIC BLOOD PRESSURE: 114 MMHG | DIASTOLIC BLOOD PRESSURE: 83 MMHG

## 2024-10-17 DIAGNOSIS — R42 VERTIGO: Primary | ICD-10-CM

## 2024-10-17 LAB
ALBUMIN SERPL-MCNC: 4.6 G/DL (ref 3.2–4.8)
ALBUMIN/GLOB SERPL: 1.4 {RATIO} (ref 1–2)
ALP LIVER SERPL-CCNC: 67 U/L
ALT SERPL-CCNC: 12 U/L
ANION GAP SERPL CALC-SCNC: 6 MMOL/L (ref 0–18)
AST SERPL-CCNC: 21 U/L (ref ?–34)
ATRIAL RATE: 82 BPM
BASOPHILS # BLD AUTO: 0.02 X10(3) UL (ref 0–0.2)
BASOPHILS NFR BLD AUTO: 0.3 %
BILIRUB SERPL-MCNC: 0.5 MG/DL (ref 0.3–1.2)
BUN BLD-MCNC: 12 MG/DL (ref 9–23)
BUN/CREAT SERPL: 17.4 (ref 10–20)
CALCIUM BLD-MCNC: 9.6 MG/DL (ref 8.7–10.4)
CHLORIDE SERPL-SCNC: 106 MMOL/L (ref 98–112)
CO2 SERPL-SCNC: 25 MMOL/L (ref 21–32)
CREAT BLD-MCNC: 0.69 MG/DL
DEPRECATED RDW RBC AUTO: 36.7 FL (ref 35.1–46.3)
EGFRCR SERPLBLD CKD-EPI 2021: 117 ML/MIN/1.73M2 (ref 60–?)
EOSINOPHIL # BLD AUTO: 0.11 X10(3) UL (ref 0–0.7)
EOSINOPHIL NFR BLD AUTO: 1.5 %
ERYTHROCYTE [DISTWIDTH] IN BLOOD BY AUTOMATED COUNT: 11.9 % (ref 11–15)
GLOBULIN PLAS-MCNC: 3.4 G/DL (ref 2–3.5)
GLUCOSE BLD-MCNC: 90 MG/DL (ref 70–99)
GLUCOSE BLDC GLUCOMTR-MCNC: 89 MG/DL (ref 70–99)
HCT VFR BLD AUTO: 37.2 %
HGB BLD-MCNC: 13 G/DL
IMM GRANULOCYTES # BLD AUTO: 0.02 X10(3) UL (ref 0–1)
IMM GRANULOCYTES NFR BLD: 0.3 %
LYMPHOCYTES # BLD AUTO: 2.41 X10(3) UL (ref 1–4)
LYMPHOCYTES NFR BLD AUTO: 32.3 %
MCH RBC QN AUTO: 29.5 PG (ref 26–34)
MCHC RBC AUTO-ENTMCNC: 34.9 G/DL (ref 31–37)
MCV RBC AUTO: 84.4 FL
MONOCYTES # BLD AUTO: 0.58 X10(3) UL (ref 0.1–1)
MONOCYTES NFR BLD AUTO: 7.8 %
NEUTROPHILS # BLD AUTO: 4.31 X10 (3) UL (ref 1.5–7.7)
NEUTROPHILS # BLD AUTO: 4.31 X10(3) UL (ref 1.5–7.7)
NEUTROPHILS NFR BLD AUTO: 57.8 %
NT-PROBNP SERPL-MCNC: 67 PG/ML (ref ?–125)
OSMOLALITY SERPL CALC.SUM OF ELEC: 283 MOSM/KG (ref 275–295)
P AXIS: 66 DEGREES
P-R INTERVAL: 152 MS
PLATELET # BLD AUTO: 275 10(3)UL (ref 150–450)
POTASSIUM SERPL-SCNC: 3.7 MMOL/L (ref 3.5–5.1)
PROT SERPL-MCNC: 8 G/DL (ref 5.7–8.2)
Q-T INTERVAL: 372 MS
QRS DURATION: 84 MS
QTC CALCULATION (BEZET): 434 MS
R AXIS: -14 DEGREES
RBC # BLD AUTO: 4.41 X10(6)UL
SODIUM SERPL-SCNC: 137 MMOL/L (ref 136–145)
T AXIS: 17 DEGREES
TROPONIN I SERPL HS-MCNC: 11 NG/L
VENTRICULAR RATE: 82 BPM
WBC # BLD AUTO: 7.5 X10(3) UL (ref 4–11)

## 2024-10-17 PROCEDURE — 96375 TX/PRO/DX INJ NEW DRUG ADDON: CPT

## 2024-10-17 PROCEDURE — 80053 COMPREHEN METABOLIC PANEL: CPT | Performed by: EMERGENCY MEDICINE

## 2024-10-17 PROCEDURE — 93010 ELECTROCARDIOGRAM REPORT: CPT

## 2024-10-17 PROCEDURE — 83880 ASSAY OF NATRIURETIC PEPTIDE: CPT | Performed by: EMERGENCY MEDICINE

## 2024-10-17 PROCEDURE — 85025 COMPLETE CBC W/AUTO DIFF WBC: CPT | Performed by: EMERGENCY MEDICINE

## 2024-10-17 PROCEDURE — 96374 THER/PROPH/DIAG INJ IV PUSH: CPT

## 2024-10-17 PROCEDURE — 93005 ELECTROCARDIOGRAM TRACING: CPT

## 2024-10-17 PROCEDURE — 96361 HYDRATE IV INFUSION ADD-ON: CPT

## 2024-10-17 PROCEDURE — 99284 EMERGENCY DEPT VISIT MOD MDM: CPT

## 2024-10-17 PROCEDURE — 82962 GLUCOSE BLOOD TEST: CPT

## 2024-10-17 PROCEDURE — 84484 ASSAY OF TROPONIN QUANT: CPT | Performed by: EMERGENCY MEDICINE

## 2024-10-17 RX ORDER — ONDANSETRON 4 MG/1
4 TABLET, ORALLY DISINTEGRATING ORAL EVERY 4 HOURS PRN
Qty: 10 TABLET | Refills: 0 | Status: SHIPPED | OUTPATIENT
Start: 2024-10-17 | End: 2024-10-24

## 2024-10-17 RX ORDER — MECLIZINE HYDROCHLORIDE 25 MG/1
25 TABLET ORAL ONCE
Status: COMPLETED | OUTPATIENT
Start: 2024-10-17 | End: 2024-10-17

## 2024-10-17 RX ORDER — MECLIZINE HYDROCHLORIDE 25 MG/1
25 TABLET ORAL 3 TIMES DAILY PRN
Qty: 20 TABLET | Refills: 0 | Status: SHIPPED | OUTPATIENT
Start: 2024-10-17

## 2024-10-17 RX ORDER — METOCLOPRAMIDE HYDROCHLORIDE 5 MG/ML
10 INJECTION INTRAMUSCULAR; INTRAVENOUS ONCE
Status: COMPLETED | OUTPATIENT
Start: 2024-10-17 | End: 2024-10-17

## 2024-10-17 RX ORDER — DIPHENHYDRAMINE HYDROCHLORIDE 50 MG/ML
12.5 INJECTION INTRAMUSCULAR; INTRAVENOUS ONCE
Status: COMPLETED | OUTPATIENT
Start: 2024-10-17 | End: 2024-10-17

## 2024-10-17 NOTE — ED PROVIDER NOTES
Patient Seen in: [facility] Emergency Department      History     Chief Complaint   Patient presents with    Dizziness     Stated Complaint: dizziness    Subjective:   HPI      33-year-old female with history of hypertension presents to the emergency department via EMS for evaluation of lightheadedness.  Patient was at work teaching, had sudden onset lightheadedness.  She associated this with nausea as well as mild shortness of breath.  She denies chest pain or pressure, vomiting, focal weakness or numbness, vertigo.  Was in her usual state of health when she went to work this morning.    Objective:     Past Medical History:    Allergic rhinitis    Anemia    Essential hypertension    Postpartum hemorrhage (HCC)    Varicella    Had chicken pox during childhood.              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 She has no children. Patient works as a . She and her  live in [place], [place]                  Physical Exam     ED Triage Vitals [10/17/24 1134]   BP (!) 150/122   Pulse 71   Resp 16   Temp 98.5 °F (36.9 °C)   Temp src Temporal   SpO2 99 %   O2 Device None (Room air)       Current Vitals:   Vital Signs  BP: 114/83  Pulse: 72  Resp: 17  Temp: 98.5  °F (36.9 °C)  Temp src: Temporal  MAP (mmHg): 93    Oxygen Therapy  SpO2: 98 %  O2 Device: None (Room air)        Physical Exam  Vitals and nursing note reviewed.   Constitutional:       General: She is not in acute distress.     Appearance: She is well-developed.   HENT:      Head: Normocephalic and atraumatic.   Eyes:      Conjunctiva/sclera: Conjunctivae normal.   Cardiovascular:      Rate and Rhythm: Normal rate and regular rhythm.      Heart sounds: Normal heart sounds.   Pulmonary:      Effort: Pulmonary effort is normal. No respiratory distress.      Breath sounds: Normal breath sounds.   Abdominal:      General: Bowel sounds are normal. There is no distension.      Palpations: Abdomen is soft.      Tenderness: There is no abdominal tenderness. There is no guarding or rebound.   Musculoskeletal:         General: Normal range of motion.      Cervical back: Normal range of motion and neck supple.   Skin:     General: Skin is warm and dry.      Findings: No rash.   Neurological:      General: No focal deficit present.      Mental Status: She is alert and oriented to person, place, and time.      Sensory: No sensory deficit.      Motor: No weakness.             ED Course     Labs Reviewed   COMP METABOLIC PANEL (14) - Normal   TROPONIN I HIGH SENSITIVITY - Normal   PRO BETA NATRIURETIC PEPTIDE - Normal   POCT GLUCOSE - Normal   CBC WITH DIFFERENTIAL WITH PLATELET   RAINBOW DRAW LAVENDER   RAINBOW DRAW LIGHT GREEN   RAINBOW DRAW BLUE     EKG    Rate, intervals and axes as noted on EKG Report.  Rate: 82  Rhythm: Sinus Rhythm  Reading: Sinus rhythm with normal intervals, no STEMI           ED Medications Administered:   Medications   metoclopramide (Reglan) 5 mg/mL injection 10 mg (10 mg Intravenous Given 10/17/24 1143)   diphenhydrAMINE (Benadryl) 50 mg/mL  injection 12.5 mg (12.5 mg Intravenous Given 10/17/24 1143)   sodium chloride 0.9 % IV bolus 1,000 mL (0 mL Intravenous Stopped 10/17/24 1230)   meclizine  (Antivert) tab 25 mg (25 mg Oral Given 10/17/24 1228)       Vitals:    10/17/24 1134 10/17/24 1145 10/17/24 1200 10/17/24 1345   BP: (!) 150/122 (!) 156/115 (!) 137/104 114/83   Pulse: 71 75 63 72   Resp: 16 18 16 17   Temp: 98.5 °F (36.9 °C)      TempSrc: Temporal      SpO2: 99% 100% 100% 98%   Weight: 64.9 kg      Height: 170.2 cm (5' 7\")        *I personally reviewed and interpreted all ED vitals.         MDM              Medical Decision Making  Differential diagnosis includes but is not limited to electrolyte imbalance, arrhythmia, cardiomyopathy, BPPV, Ménière's, TIA, stroke, malignancy    Well-appearing patient, labs are unrevealing, EKG within normal limits, patient back to baseline after meclizine and Zofran, feeling much better.  On further discussion patient reports symptoms were more consistent with vertigo.  Low suspicion for stroke, discussed this with the patient, though advised close outpatient follow-up as well strict return precautions.  Patient verbalizes understanding of and agreement with this plan.    Problems Addressed:  Vertigo: acute illness or injury    Amount and/or Complexity of Data Reviewed  Independent Historian: EMS  External Data Reviewed: notes.     Details: Review of discharge summary from 9/23/2023 notes patient had episode of lightheadedness thought to be related to unintentionally taking 3 extra tablets of Procardia  Labs: ordered.  ECG/medicine tests: ordered.    Risk  Prescription drug management.        Disposition and Plan     Clinical Impression:  1. Vertigo         Disposition:  Discharge  10/17/2024  2:24 pm    Follow-up:  Lady Thakkar DO  [address] [address]  [phone]    Schedule an appointment as soon as possible for a visit in 1 week(s)  For follow up          Medications Prescribed:  Discharge Medication List as of 10/17/2024  2:30 PM        START taking these medications    Details   meclizine 25 MG Oral Tab Take 1 tablet (25 mg total)  by mouth 3 (three) times daily as needed for Dizziness., Normal, Disp-20 tablet, R-0      ondansetron 4 MG Oral Tablet Dispersible Take 1 tablet (4 mg total) by mouth every 4 (four) hours as needed for Nausea., Normal, Disp-10 tablet, R-0                 Supplementary Documentation:

## 2024-10-17 NOTE — DISCHARGE INSTRUCTIONS
Take meclizine as needed for vertigo.    Stay hydrated, use Zofran as needed for nausea.    See primary care in the next week for a follow-up appointment.    Return to the ER if you develop worsening symptoms, double vision, weakness or numbness on one side of the body, slurred speech, or any emergent concerns.    We want you to be able to vote in the upcoming election in a safe and healthy way. You can go to www.vot-er.[email or web address]/healthyvote or https://[place]lections.[place].gov/ for more information.  If you have not already, make sure your voter registration is up to date so you can participate in the general elections this November.

## 2024-10-17 NOTE — ED INITIAL ASSESSMENT (HPI)
Received pt via EMS. Pt presents to ED r/t dizziness. 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 Pt denies cp/sob/weakness/numbness. Pt has hx of HTN

## 2024-10-18 ENCOUNTER — TELEPHONE (OUTPATIENT)
Facility: CLINIC | Age: 34
End: 2024-10-18

## 2024-10-18 NOTE — TELEPHONE ENCOUNTER
Outgoing call to patient and is schedule for 11/05/2024.    Complete - No action needed at this time.

## 2024-10-18 NOTE — TELEPHONE ENCOUNTER
The patient is calling for a follow-up appointment from her ER visit. The patient was in the ER on yesterday for vertigo.

## 2024-10-23 ENCOUNTER — HOSPITAL ENCOUNTER (OUTPATIENT)
Age: 34
Discharge: HOME | End: 2024-10-23
Payer: COMMERCIAL

## 2024-10-23 VITALS
SYSTOLIC BLOOD PRESSURE: 134 MMHG | OXYGEN SATURATION: 100 % | HEART RATE: 60 BPM | RESPIRATION RATE: 18 BRPM | TEMPERATURE: 98 F | DIASTOLIC BLOOD PRESSURE: 84 MMHG

## 2024-10-23 DIAGNOSIS — H11.442 CONJUNCTIVAL CYST, LEFT: Primary | ICD-10-CM

## 2024-10-23 PROCEDURE — 99213 OFFICE O/P EST LOW 20 MIN: CPT

## 2024-10-23 RX ORDER — OFLOXACIN 3 MG/ML
1 SOLUTION/ DROPS OPHTHALMIC 3 TIMES DAILY
Qty: 5 ML | Refills: 0 | Status: SHIPPED | OUTPATIENT
Start: 2024-10-23 | End: 2024-10-28

## 2024-10-23 NOTE — ED INITIAL ASSESSMENT (HPI)
Patient with left outer corner \"bubble\" to the eye  She noticed it this morning   Denies trauma  No vision changes  Patient wears contacts, last use was yesterday

## 2024-10-23 NOTE — ED PROVIDER NOTES
Patient Seen in: [facility] [place]      History     Chief Complaint   Patient presents with    Eye Problem     Stated Complaint: Left eye possible conjuntivitis    Subjective:   HPI    33-year-old female presents to the immediate care with a left small clear fluid blister to the outer left eye  That she noticed this morning.  No trauma.  No change in vision.  No drainage.  States it feels as if something is in her eye with mild itching.  She states that she does wear contacts but took them out last night.  No lid swelling    Objective:     Past Medical History:    Allergic rhinitis    Anemia    Essential hypertension    Postpartum hemorrhage (HCC)    Varicella    Had chicken pox during childhood.              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 She has no children. Patient works as a . She and her  live in [place], [place]              Review of Systems    Positive for stated complaint: Left eye possible conjuntivitis  Other systems are as noted in HPI.  Constitutional and vital signs reviewed.      All other systems reviewed and negative except as noted above.    Physical Exam     ED Triage Vitals [10/23/24 1623]   /84   Pulse 60   Resp 18   Temp  97.7 °F (36.5 °C)   Temp src Temporal   SpO2 100 %   O2 Device None (Room air)       Current Vitals:   Vital Signs  BP: 134/84  Pulse: 60  Resp: 18  Temp: 97.7 °F (36.5 °C)  Temp src: Temporal    Oxygen Therapy  SpO2: 100 %  O2 Device: None (Room air)      Right Eye Chart Acuity: 20/15, Corrected  Left Eye Chart Acuity: 20/10, Corrected  Physical Exam  Vitals and nursing note reviewed.   Constitutional:       Appearance: Normal appearance.   Eyes:      General: Lids are normal.      Conjunctiva/sclera:      Left eye: Left conjunctiva is injected (Outer corner). No chemosis, exudate or hemorrhage.     Comments: Mild small clear blister versus left on her cornea no drainage EOMs intact   Neurological:      Mental Status: She is alert.             ED Course   Labs Reviewed - No data to display                MDM              Medical Decision Making  33-year-old female presents to the immediate care with a small \"bubble \"to the outer corner of her eye differentials include conjunctival cyst bolus Prophy, chemosis pinguecula, conjunctivitis   Patient does have redness with suspicion for conjunctivitis probable conjunctival cyst patient did take her contacts out last night so will cover with Ocuflox until close follow-up with ophtho if symptoms persist or worsen return precautions given    Problems Addressed:  Conjunctival cyst, left: acute illness or injury with systemic symptoms that poses a threat to life or bodily functions    Risk  OTC drugs.  Prescription drug management.        Disposition and Plan     Clinical Impression:  1. Conjunctival cyst, left         Disposition:  Discharge  10/23/2024  4:57 pm    Follow-up:  Lady Thakkar DO  [address] [facility]  [address]  [facility] [address]  [phone]          Kirby Almaguer MD  [address] [facility]  [address]  [facility] [address]  [phone]      If symptoms worsen          Medications Prescribed:  Discharge Medication List as of 10/23/2024  5:01 PM        START  taking these medications    Details   ofloxacin 0.3 % Ophthalmic Solution Place 1 drop into the left eye in the morning, at noon, and at bedtime for 5 days., Normal, Disp-5 mL, R-0                 Supplementary Documentation:

## 2024-10-23 NOTE — DISCHARGE INSTRUCTIONS
Small blister suspect conjunctival cyst  Improved over time use antibiotics secondary to using contacts avoid using contacts until blister resolved  Follow-up with eye specialist as needed return for concerns

## 2024-10-24 ENCOUNTER — GENETICS ENCOUNTER (OUTPATIENT)
Dept: GENETICS | Facility: HOSPITAL | Age: 34
End: 2024-10-24
Attending: FAMILY MEDICINE
Payer: COMMERCIAL

## 2024-10-24 ENCOUNTER — APPOINTMENT (OUTPATIENT)
Dept: HEMATOLOGY/ONCOLOGY | Facility: HOSPITAL | Age: 34
End: 2024-10-24
Attending: FAMILY MEDICINE
Payer: COMMERCIAL

## 2024-10-24 DIAGNOSIS — Z80.3 FAMILY HISTORY OF MALIGNANT NEOPLASM OF BREAST: Primary | ICD-10-CM

## 2024-10-24 PROCEDURE — 96040 HC GENETIC COUNSELING EA 30 MIN: CPT | Performed by: GENETIC COUNSELOR, MS

## 2024-10-24 NOTE — PROGRESS NOTES
Reason for visit: Mrs. Castaneda is a 33-year-old woman who was referred for genetic counseling due to her family history of breast cancer.  She was seen today for genetic counseling and to discuss the option of genetic testing given her family history.  Mrs. Castaneda is  and is employed as a [employer] .  She has a three-year old daughter.  Relevant family history:  Mrs. Castaneda shares that her mother was diagnosed with breast cancer at age 69 and with a contralateral breast cancer several years later.  She is alive and well at age 74 and to her knowledge, has not pursued genetic testing to date.  She has a maternal aunt who may have had an early-stage breast cancer post-menopausally.  Aside from her maternal grandmother, who was a smoker and who passed away from lung cancer, she is unaware of any malignancies on her maternal side.  She has limited information for her paternal side of the family due to geography and family dynamics.  She is unaware of any family members that have pursued genetic testing to date.  She is of -American ancestry on her both sides of her family and is unaware of any Ashkenazi Islam heritage or consanguinity.  Her daughter is healthy and fine by her report.  Medical history: Mrs. Castaneda is in good health.  She has not yet started routine breast imaging and denies any history of breast biopsies and denies any current breast-related complaints.  She denies any personal history of thyroid issues, uterine fibroids or current dermatological concerns. She has not yet had a colonoscopy.  She was 10 at menarche and was 30 at the birth of her daughter.  She denies any gynecologic surgery and has her ovaries and her uterus intact.  She denies the use of hormone replacement therapy or oral contraception.  She denies any tobacco use or regular alcohol consumption.    Summary:   We discussed hereditary breast cancer with   Leonel because of her family history.  Most breast and/or ovarian cancer is not hereditary; however, hereditary cancer is suspected under certain conditions, such as when breast cancer occurs prior to the age of 50 (or premenopausal), when there are multiple affected family members, when breast cancer occurs in combination with other cancers, especially ovarian cancer, and when cancer appears to be passing from generation to generation, or when an individual has more than one cancer diagnosed. We reviewed how limited family structure can mask those families with hereditary cancer predisposition.    We discussed dominant inheritance, the pattern in which most hereditary cancer conditions are inherited.  If an individual has such a cancer predisposing gene mutation, there is a 50% chance that any offspring will not inherit the mutation and a 50% chance that he or she will inherit it.  Inheriting the mutation does not automatically mean that one will develop cancer, rather that there is an increased chance for developing certain types of cancer.  Cancer predisposing gene mutations can exist in males and females and can be passed on to both male and female offspring.   The pros and cons of cancer predisposing gene mutation testing were reviewed with Mrs. Castaneda.  Genetic test results can have significant impact on medical management, planning, screening, surgical decision-making, cancer risk assessment for the patient and relatives, and psychological/emotional well-being.  Mutations in the genes BRCA1 and BRCA2 account for most (but not all) cases of hereditary breast cancer.  Mutations in other genes have also been associated with an increased risk for breast cancer - but mutations in these other genes are statistically less often seen in hereditary breast cancer.    We discussed the benefits and limitations of BRCA1/2 testing versus multi-gene panels that include BRCA1/2.  Panels are an appropriate  option for individuals whose history is suggestive of more than one syndrome, and they improve detection rate for identifying the underlying cause of a hereditary cancer.  Limitations of panels include an unknown percentage of variants of unknown significance, as well as an uncertainty of level of risk associated with certain newer genes, and therefore lack of clear guidelines for risk management of carriers of some of these mutations.  There are panels that assess for mutations in only “high risk” and clinically actionable breast cancer genes as well as larger panels that assess for mutations in high, moderate and unknown-penetrance breast cancer genes.  Genetic testing could yield one of three results: no mutation detected, deleterious mutation detected, or variant of uncertain significance.  The implications of these potential results were reviewed with Mrs. Castaneda.  The optimal testing strategy is to test an affected family member first.  We discussed the limitations of interpreting test results of an unaffected individual.  Specifically, a negative result in an unaffected individual is uninformative unless a known mutation has been identified in an affected relative.  She does not believe her mother is willing to pursue genetic testing.  Another option is testing Mrs. Castaneda rather than an affected relative, and we reviewed the limitations of this testing, including concerns about discrimination by life insurers, long term healthcare or disability, which are not covered by statutes yet.    Given that her mother had bilateral breast cancer and she has limited family structure on her paternal side, she does meet NCCN criteria for genetic testing.   After discussing the multiple testing options, Mrs. Castaneda decided that she would like to pursue a multi-gene panel that also includes RNA analysis ([employer]ae Multi-Cancer Panel+RNA, 70 genes.)  As she has an HMO insurance, an authorization is  necessary and will be requested.  Once this is authorized, she will be contacted to facilitate a blood draw.  Turn-around-time is approximately two to three weeks for the testing.  Our office will call Mrs. Castaneda as soon as results are received; post-test counseling can be scheduled at that time.  Plan:  Requested authorization for [employer]ae Multi-Cancer Panel+RNA (70 genes) through [employer].  Once an authorization is approved, she will be contacted to coordinate a blood draw.  The Genetics office will call Mrs. Castaneda when results are received.  Post-test counseling can be scheduled at that time.  Recommendations for Mrs. Castaneda and family members will depend on above test results.  Thank you for referring Mrs. Castaneda for genetic counseling; please do not hesitate to contact our office if you have any questions or concerns, [phone].  Send to: Lady Thakkar,   Time spent with patient: 55 minutes

## 2024-10-30 ENCOUNTER — TELEPHONE (OUTPATIENT)
Facility: CLINIC | Age: 34
End: 2024-10-30

## 2024-10-30 NOTE — TELEPHONE ENCOUNTER
Outgoing call to patient to inform we received a call from [facility] insurance to inform that patient needs to call them and update her PCP information.  Base on there records patient has a different PCP and needs to be updated for any prior authorization patient might need .    Patient needs to call [phone].      A detail message was left for patient .

## 2024-11-05 ENCOUNTER — OFFICE VISIT (OUTPATIENT)
Facility: CLINIC | Age: 34
End: 2024-11-05
Payer: COMMERCIAL

## 2024-11-05 VITALS
WEIGHT: 152.13 LBS | HEART RATE: 90 BPM | OXYGEN SATURATION: 99 % | SYSTOLIC BLOOD PRESSURE: 128 MMHG | RESPIRATION RATE: 18 BRPM | DIASTOLIC BLOOD PRESSURE: 84 MMHG | BODY MASS INDEX: 23.88 KG/M2 | HEIGHT: 67 IN

## 2024-11-05 DIAGNOSIS — R42 VERTIGO: Primary | ICD-10-CM

## 2024-11-05 PROCEDURE — 3079F DIAST BP 80-89 MM HG: CPT | Performed by: FAMILY MEDICINE

## 2024-11-05 PROCEDURE — 3074F SYST BP LT 130 MM HG: CPT | Performed by: FAMILY MEDICINE

## 2024-11-05 PROCEDURE — 99213 OFFICE O/P EST LOW 20 MIN: CPT | Performed by: FAMILY MEDICINE

## 2024-11-05 PROCEDURE — 3008F BODY MASS INDEX DOCD: CPT | Performed by: FAMILY MEDICINE

## 2024-11-05 NOTE — PROGRESS NOTES
CC:    Chief Complaint   Patient presents with    ER F/U     Pt. Presents for a follow up from 10/17/24, pt. States that she was experiencing dizziness, nausea, vomiting, sensitivity to light while she was at [employer]. Pt was diagnosed with vertigo and prescribed benadryl and Zofran. Pt. States she feels better has not experienced any of the previous symptoms.        HPI: 33 year old female here for an ER follow-up due to vertigo.  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   She threw up several times after this on her way to the ER.  She had not eaten much that morning, but denies any known triggers.   Had a normal EKG and labs were negative as well.  She was given Meclizine and Zofran, which helped.  Has been checking her blood pressure since then, and it did normalize.   She denies any symptoms since that time.   Denies any previous history of vertigo.    Trying to drink more fluids, but drinks at least 2-3 bottles of water a day.  Has not been taking Meclizine, but still takes her Nifedipine daily.     ROS:  General:  No fever, no fatigue, no weight changes  HEENT:  Denies congestion or nasal discharge  Cardio:  No chest pain  Pulmonary:  No cough, no SOB  GI:  No N/V/D  :  No discharge, no dysuria, no polyuria, no hematuria  Dermatologic:  No rashes  Neuro: no headaches, no dizziness, resolved vertigo     Past Medical History:    Allergic rhinitis    Anemia    Essential hypertension    Postpartum hemorrhage (HCC)    Varicella    Had chicken pox during childhood.       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 She has no children. Patient works as a . She and her  live in [place], [place]     Social Drivers of Health     Financial Resource Strain: Not on file   Food Insecurity: Not on file   Transportation Needs: Not on file   Physical Activity: Not on file   Stress: Not on file   Social Connections: Not on file   Housing Stability: Not on file       Current Outpatient Medications   Medication Sig Dispense Refill    NIFEdipine ER 60 MG Oral Tablet 24 Hr Take 1 tablet (60 mg total) by mouth daily. 90 tablet 3       Hazelnuts, Penicillins, Shellfish-derived products, and Seasonal      Vitals:   Vitals:    11/05/24 1043   BP: 128/84   Pulse: 90   Resp: 18   SpO2: 99%   Weight: 152 lb 2 oz (69 kg)   Height: 5' 7\" (1.702 m)       Body mass index is 23.83 kg/m².    Physical:  General:  Alert, appropriate, no acute distress   HEENT: supple, no tonsillar erythema or exudate, no lymphadenopathy, bilateral TM's normal   Cardio:  RRR, no murmurs, S1, S2  Pulmonary:  Clear bilaterally, good air entry  Dermatologic:  No rashes or lesions  Neuro: CN II-XII intact, 5/5 muscle strength bilateral upper and lower extremities, normal cerebellar testing, no focal neurologic deficits       Assessment and Plan: 33 year old female here for ER follow-up due to resolved vertigo.    1. Vertigo    -Symptoms completely resolved since ER visit, and normal neurologic exam without any concerns for central causes to vertigo  - Discussed continuing to control her blood pressure and increasing fluid hydration, and to notify me if symptoms recur    A total of 15 minutes of this visit were  spent face to face with the patient and >50% was spent on counseling and coordination of care.       Lady Thakkar DO  11/05/24  10:46 AM

## 2024-11-11 ENCOUNTER — TELEPHONE (OUTPATIENT)
Facility: CLINIC | Age: 34
End: 2024-11-11

## 2024-11-11 DIAGNOSIS — H11.449: Primary | ICD-10-CM

## 2024-11-11 NOTE — TELEPHONE ENCOUNTER
Patient called back, [facility] is [facility]  Message with referral information sent to patient.

## 2024-11-11 NOTE — TELEPHONE ENCOUNTER
Patient contacts clinic reporting she was seen in immediate care 10/23 for a cyst on her eye.  It is still present and affecting her vision.  Denies pain.  Requesting ophthalmologist referral.  Pended for signature.

## 2024-11-11 NOTE — TELEPHONE ENCOUNTER
Patient notified of below, msg left referral is ready.    Referred to Provider Information:  Provider Address Phone   Kirby Almaguer  [address] [facility]  [address]  [facility] [address] [phone]

## 2024-11-20 NOTE — TELEPHONE ENCOUNTER
Refill Passed Per Protocol    Requested Prescriptions   Pending Prescriptions Disp Refills    NIFEDIPINE ER 60 MG Oral Tablet 24 Hr [Pharmacy Med Name: NIFEDIPINE 60MG ER (CC) TABLETS] 90 tablet 3     Sig: TAKE 1 TABLET(60 MG) BY MOUTH DAILY       Hypertension Medications Protocol Passed - 11/20/2024  4:06 PM        Passed - CMP or BMP in past 12 months        Passed - Last BP reading less than 140/90     BP Readings from Last 1 Encounters:   11/05/24 128/84               Passed - In person appointment or virtual visit in the past 12 mos or appointment in next 3 mos     Recent Outpatient Visits              2 weeks ago Vertigo    [facility] Lady Thakkar,     Office Visit    1 month ago Encounter for routine adult health examination without abnormal findings    [facility] Lady Thakkar,     Office Visit    1 year ago Acute viral syndrome    [facility] [place] [facility] [facility]    Office Visit    1 year ago Calcium channel blocker overdose, accidental or unintentional, initial encounter    [facility] Lady Thakkar,     Office Visit    1 year ago Arthralgia of temporomandibular joint, unspecified laterality    [facility] [place],     Office Visit                      Passed - EGFRCR or GFRAA > 50     GFR Evaluation  EGFRCR: 117 , resulted on 10/17/2024                 Recent Outpatient Visits              2 weeks ago Vertigo    [facility],     Office Visit    1 month ago Encounter for routine adult health examination without abnormal findings    [facility] Lady Thakkar,     Office Visit    1 year ago Acute viral syndrome    [facility]     Office Visit    1 year ago Calcium channel blocker overdose, accidental or unintentional, initial encounter    [facility], [facility] Lady Thakkar, DO    Office Visit    1 year ago Arthralgia of temporomandibular joint, unspecified laterality    [facility], [facility], [place] Cespedes, David B, DO    Office Visit

## 2025-01-12 ENCOUNTER — HOSPITAL ENCOUNTER (OUTPATIENT)
Age: 35
Discharge: HOME | End: 2025-01-12
Payer: COMMERCIAL

## 2025-01-12 VITALS
TEMPERATURE: 98 F | HEART RATE: 60 BPM | OXYGEN SATURATION: 99 % | SYSTOLIC BLOOD PRESSURE: 140 MMHG | DIASTOLIC BLOOD PRESSURE: 86 MMHG | RESPIRATION RATE: 16 BRPM

## 2025-01-12 DIAGNOSIS — K52.9 GASTROENTERITIS: Primary | ICD-10-CM

## 2025-01-12 LAB
B-HCG UR QL: NEGATIVE
BASOPHILS # BLD AUTO: 0.01 X10(3) UL (ref 0–0.2)
BASOPHILS NFR BLD AUTO: 0.3 %
BUN BLD-MCNC: 19 MG/DL (ref 7–18)
CHLORIDE BLD-SCNC: 98 MMOL/L (ref 98–112)
CO2 BLD-SCNC: 25 MMOL/L (ref 21–32)
CREAT BLD-MCNC: 1 MG/DL
DEPRECATED RDW RBC AUTO: 38.7 FL (ref 35.1–46.3)
EGFRCR SERPLBLD CKD-EPI 2021: 76 ML/MIN/1.73M2 (ref 60–?)
EOSINOPHIL # BLD AUTO: 0.02 X10(3) UL (ref 0–0.7)
EOSINOPHIL NFR BLD AUTO: 0.6 %
ERYTHROCYTE [DISTWIDTH] IN BLOOD BY AUTOMATED COUNT: 12.1 % (ref 11–15)
GLUCOSE BLD-MCNC: 90 MG/DL (ref 70–99)
HCT VFR BLD AUTO: 35 %
HCT VFR BLD AUTO: 35.2 %
HCT VFR BLD CALC: 35 %
HGB BLD-MCNC: 11.2 G/DL
HGB BLD-MCNC: 11.3 G/DL
IMM GRANULOCYTES # BLD AUTO: 0.01 X10(3) UL (ref 0–1)
IMM GRANULOCYTES NFR BLD: 0.3 %
ISTAT IONIZED CALCIUM FOR CHEM 8: 1.11 MMOL/L (ref 1.12–1.32)
LYMPHOCYTES # BLD AUTO: 0.88 X10(3) UL (ref 1–4)
LYMPHOCYTES NFR BLD AUTO: 24.4 %
MCH RBC QN AUTO: 27.8 PG (ref 26–34)
MCH RBC QN AUTO: 27.8 PG (ref 26–34)
MCHC RBC AUTO-ENTMCNC: 31.8 G/DL (ref 31–37)
MCHC RBC AUTO-ENTMCNC: 32.3 G/DL (ref 31–37)
MCV RBC AUTO: 86.2 FL (ref 80–100)
MCV RBC AUTO: 87.3 FL
MONOCYTES # BLD AUTO: 0.67 X10(3) UL (ref 0.1–1)
MONOCYTES NFR BLD AUTO: 18.6 %
NEUTROPHILS # BLD AUTO: 2.02 X10 (3) UL (ref 1.5–7.7)
NEUTROPHILS # BLD AUTO: 2.02 X10(3) UL (ref 1.5–7.7)
NEUTROPHILS NFR BLD AUTO: 55.8 %
PLATELET # BLD AUTO: 230 X10ˆ3/UL (ref 150–450)
PLATELET # BLD AUTO: 233 10(3)UL (ref 150–450)
POCT INFLUENZA A: NEGATIVE
POCT INFLUENZA B: NEGATIVE
POTASSIUM BLD-SCNC: 3.2 MMOL/L (ref 3.6–5.1)
RBC # BLD AUTO: 4.03 X10(6)UL
RBC # BLD AUTO: 4.06 X10ˆ6/UL
SODIUM BLD-SCNC: 136 MMOL/L (ref 136–145)
WBC # BLD AUTO: 3.6 X10(3) UL (ref 4–11)
WBC # BLD AUTO: 3.6 X10ˆ3/UL (ref 4–11)

## 2025-01-12 PROCEDURE — 96374 THER/PROPH/DIAG INJ IV PUSH: CPT

## 2025-01-12 PROCEDURE — 80047 BASIC METABLC PNL IONIZED CA: CPT

## 2025-01-12 PROCEDURE — 96361 HYDRATE IV INFUSION ADD-ON: CPT

## 2025-01-12 PROCEDURE — 99214 OFFICE O/P EST MOD 30 MIN: CPT

## 2025-01-12 PROCEDURE — 81025 URINE PREGNANCY TEST: CPT

## 2025-01-12 PROCEDURE — 87502 INFLUENZA DNA AMP PROBE: CPT | Performed by: NURSE PRACTITIONER

## 2025-01-12 PROCEDURE — 85025 COMPLETE CBC W/AUTO DIFF WBC: CPT | Performed by: NURSE PRACTITIONER

## 2025-01-12 RX ORDER — SODIUM CHLORIDE 9 MG/ML
1000 INJECTION, SOLUTION INTRAVENOUS ONCE
Status: COMPLETED | OUTPATIENT
Start: 2025-01-12 | End: 2025-01-12

## 2025-01-12 RX ORDER — ONDANSETRON 2 MG/ML
4 INJECTION INTRAMUSCULAR; INTRAVENOUS ONCE
Status: COMPLETED | OUTPATIENT
Start: 2025-01-12 | End: 2025-01-12

## 2025-01-12 RX ORDER — POTASSIUM CHLORIDE 1500 MG/1
40 TABLET, EXTENDED RELEASE ORAL ONCE
Status: COMPLETED | OUTPATIENT
Start: 2025-01-12 | End: 2025-01-12

## 2025-01-12 RX ORDER — ONDANSETRON 4 MG/1
4 TABLET, ORALLY DISINTEGRATING ORAL EVERY 4 HOURS PRN
Qty: 10 TABLET | Refills: 0 | Status: SHIPPED | OUTPATIENT
Start: 2025-01-12 | End: 2025-01-19

## 2025-01-12 NOTE — ED INITIAL ASSESSMENT (HPI)
Patient reports episodes of emesis starting Saturday at 0000.  Reports up to 30 episodes of emesis and 4 episodes of loose stool within the last 24 hours.  + fatigue and body aches.  States  with flu.  Reports urinating this am.

## 2025-01-12 NOTE — ED PROVIDER NOTES
Chief Complaint   Patient presents with    Vomiting       HPI:     Kang is a 34 year old female who presents with a chief complaint of vomiting and diarrhea for 3 days. Emesis and stool non-bloody. No fever.  No abdominal pain.  She is urinating normally.  No URI symptoms.  No abdominal surgeries.  No recent travel.  She reports several family members she has been exposed to recently have similar symptoms.    PSFH: PFSH asessment screens reviewed and agree.  Nurses notes reviewed I agree with documentation.    Family History   Problem Relation Age of Onset    Hypertension Mother         High blood pressure    Stroke Mother         Stroke    Breast Cancer Mother 69    Cancer Mother 69        contralateral @ 71    Hypertension Maternal Grandmother         High blood pressure    Cancer Maternal Grandmother         Lung cancer from smoking    Hypertension Maternal Grandfather         High blood pressure    Stroke Maternal Grandfather         Stroke    Hypertension Brother     Breast Cancer Maternal Aunt 67    Diabetes Maternal Uncle     Heart Disorder Maternal Uncle     Heart Disease Maternal Uncle         status-post bypass    Glaucoma Neg     Macular degeneration Neg     Bleeding Disorders Neg     Sickle Cell Neg     DVT/VTE Neg     Prostate Cancer Neg     Ovarian Cancer Neg     Pancreatic Cancer Neg      Family history reviewed with patient/caregiver and is not pertinent to presenting problem.  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 She has no children. Patient works as a . She and her  live in [place], [place]     Social Drivers of Health     Financial Resource Strain: Not on file   Food Insecurity: Not on file   Transportation Needs: Not on file   Physical Activity: Not on file   Stress: Not on file   Social Connections: Not on file   Housing Stability: Not on file       ROS:   Positive for stated complaint: vomiting  All other systems reviewed and negative except as noted above.  Constitutional and Vital Signs Reviewed.      Physical Exam:     /86   Pulse 60   Temp 98 °F (36.7 °C) (Oral)   Resp 16   LMP 09/30/2024 (Exact Date)   SpO2 99%   GENERAL: well developed, well nourished, well hydrated, no distress  EYES: sclera non icteric bilateral  HEENT: atraumatic, normocephalic, ears, nose and throat are clear  NECK: supple, no adenopathy  LUNGS: clear to auscultation, no RRW  CARDIO: RRR without murmur  GI: soft, non-tender, without masses or organomegaly  BACK: no CVAT  EXTREMITIES: no cyanosis or edema. PURVIS without difficulty  SKIN: good skin turgor, no obvious rashes  MDM/Assessment/Plan:   Orders for this encounter:    Orders Placed This Encounter    CBC w/Auto Diff     Order Specific Question:   Release to patient     Answer:   Immediate    POCT CBC     Order Specific Question:   Release to patient     Answer:   Immediate    POCT Pregnancy, Urine    POCT ISTAT chem8 cartridge    POCT Flu Test     Order Specific Question:   Release to patient     Answer:   Immediate    POCT Pregnancy, Urine    iStat (Chem 8)    Insert Peripheral IV    Oral fluid challenge    ondansetron (Zofran) 4 MG/2ML injection 4 mg    sodium chloride 0.9% infusion 1,000 mL    potassium chloride (Klor-Con M20)  tab 40 mEq    ondansetron 4 MG Oral Tablet Dispersible     Sig: Take 1 tablet (4 mg total) by mouth every 4 (four) hours as needed for Nausea.     Dispense:  10 tablet     Refill:  0       Labs performed this visit:  Recent Results (from the past 10 hours)   POCT Flu Test    Collection Time: 01/12/25 10:37 AM    Specimen: Nares; Other   Result Value Ref Range    POCT INFLUENZA A Negative Negative    POCT INFLUENZA B Negative Negative   POCT CBC    Collection Time: 01/12/25 11:34 AM   Result Value Ref Range    WBC IC 3.6 (L) 4.0 - 11.0 x10ˆ3/uL    RBC IC 4.06 3.80 - 5.30 X10ˆ6/uL    HGB IC 11.3 (L) 12.0 - 16.0 g/dL    HCT IC 35.0 35.0 - 48.0 %    MCV IC 86.2 80.0 - 100.0 fL    MCH IC 27.8 26.0 - 34.0 pg    MCHC IC 32.3 31.0 - 37.0 g/dL    PLT .0 150.0 - 450.0 X10ˆ3/uL    # Neutrophil      # Lymphocyte      # Mixed Cells      Neutrophil %      Lymphocyte %      Mixed Cell %     POCT Pregnancy, Urine    Collection Time: 01/12/25 11:37 AM   Result Value Ref Range    POCT Urine Pregnancy Negative Negative   POCT ISTAT chem8 cartridge    Collection Time: 01/12/25 11:47 AM   Result Value Ref Range    ISTAT Sodium 136 136 - 145 mmol/L    ISTAT BUN 19 (H) 7 - 18 mg/dL    ISTAT Potassium 3.2 (L) 3.6 - 5.1 mmol/L    ISTAT Chloride 98 98 - 112 mmol/L    ISTAT Ionized Calcium 1.11 (L) 1.12 - 1.32 mmol/L    ISTAT Hematocrit 35 34 - 50 %    ISTAT Glucose 90 70 - 99 mg/dL    ISTAT TCO2 25 21 - 32 mmol/L    ISTAT Creatinine 1.00 0.55 - 1.02 mg/dL    eGFR-Cr 76 >=60 mL/min/1.73m2        MDM:  Medical Decision Making  Differentials considered: Gastroenteritis versus colitis versus acute abdomen versus other    HPI and exam consistent with gastroenteritis.  Patient is healthy appearing, normal vitals.  No abdominal tenderness on exam, low suspicion for acute abdomen. CBC wnl, mildly decreased WBC, consistent with viral infection. Chem 8 with mild hypokalemia, replaced with KCl 40 meq today. Urine pregnancy negative. Patient given  1 L NS in clinic IV, IV zofran 4 mg and feeling much better. Tolerated po challenge. Discussed supportive care. Zofran sent to pharmacy. Discussed following up with primary care provider in 2 days for recheck.  ER precautions were discussed in great detail.  Patient verbalized understanding and agreeable to the plan of care.    Case dicussed with Dr. Shubham Mendoza-Coad        Amount and/or Complexity of Data Reviewed  Labs: ordered. Decision-making details documented in ED Course.     Details: Cbc, chem 8, urine pregnancy     Risk  Prescription drug management.          Diagnosis:    ICD-10-CM    1. Gastroenteritis  K52.9           All results reviewed and discussed with patient.  See AVS for detailed discharge instructions for your condition today.    Follow Up with:  No follow-up provider specified.

## 2025-01-12 NOTE — DISCHARGE INSTRUCTIONS
Zofran 1 tablet every 8 hours as needed for nausea, vomiting  Push fluids  JOSEFINA diet for now then slowly advance.  (Bananas, rice, applesauce, toast, tea)  If you develop abdominal pain, vomiting that will not stop, or worsening symptoms, please go to the emergency room  Follow up with your doctor in 2 days if no improvement

## 2025-04-08 ENCOUNTER — PATIENT MESSAGE (OUTPATIENT)
Facility: CLINIC | Age: 35
End: 2025-04-08

## 2025-08-05 ENCOUNTER — TELEPHONE (OUTPATIENT)
Facility: CLINIC | Age: 35
End: 2025-08-05

## 2025-08-05 RX ORDER — MAGNESIUM OXIDE 400 MG (241.3 MG MAGNESIUM) TABLET
1 TABLET DAILY
COMMUNITY
Start: 2025-08-05

## 2025-08-23 ENCOUNTER — OFFICE VISIT (OUTPATIENT)
Facility: CLINIC | Age: 35
End: 2025-08-23

## 2025-08-23 ENCOUNTER — LAB ENCOUNTER (OUTPATIENT)
Dept: LAB | Facility: HOSPITAL | Age: 35
End: 2025-08-23
Attending: FAMILY MEDICINE

## 2025-08-23 ENCOUNTER — RESULTS FOLLOW-UP (OUTPATIENT)
Facility: CLINIC | Age: 35
End: 2025-08-23

## 2025-08-23 VITALS
DIASTOLIC BLOOD PRESSURE: 82 MMHG | HEIGHT: 67 IN | WEIGHT: 151 LBS | HEART RATE: 92 BPM | OXYGEN SATURATION: 99 % | BODY MASS INDEX: 23.7 KG/M2 | SYSTOLIC BLOOD PRESSURE: 124 MMHG

## 2025-08-23 DIAGNOSIS — E61.1 IRON DEFICIENCY: ICD-10-CM

## 2025-08-23 DIAGNOSIS — Z80.3 FAMILY HISTORY OF MALIGNANT NEOPLASM OF BREAST: ICD-10-CM

## 2025-08-23 DIAGNOSIS — I10 HYPERTENSION WITH GOAL BLOOD PRESSURE LESS THAN 130/80: ICD-10-CM

## 2025-08-23 DIAGNOSIS — Z00.00 ENCOUNTER FOR ROUTINE ADULT HEALTH EXAMINATION WITHOUT ABNORMAL FINDINGS: ICD-10-CM

## 2025-08-23 DIAGNOSIS — Z00.00 ENCOUNTER FOR ROUTINE ADULT HEALTH EXAMINATION WITHOUT ABNORMAL FINDINGS: Primary | ICD-10-CM

## 2025-08-23 LAB
ALBUMIN SERPL-MCNC: 4.6 G/DL (ref 3.2–4.8)
ALBUMIN/GLOB SERPL: 1.3 (ref 1–2)
ALP LIVER SERPL-CCNC: 60 U/L (ref 37–98)
ALT SERPL-CCNC: 11 U/L (ref 10–49)
ANION GAP SERPL CALC-SCNC: 9 MMOL/L (ref 0–18)
AST SERPL-CCNC: 19 U/L (ref ?–34)
BASOPHILS # BLD AUTO: 0.04 X10(3) UL (ref 0–0.2)
BASOPHILS NFR BLD AUTO: 0.7 %
BILIRUB SERPL-MCNC: 0.5 MG/DL (ref 0.3–1.2)
BUN BLD-MCNC: 11 MG/DL (ref 9–23)
BUN/CREAT SERPL: 12.9 (ref 10–20)
CALCIUM BLD-MCNC: 9.5 MG/DL (ref 8.7–10.4)
CHLORIDE SERPL-SCNC: 104 MMOL/L (ref 98–112)
CHOLEST SERPL-MCNC: 192 MG/DL (ref ?–200)
CO2 SERPL-SCNC: 24 MMOL/L (ref 21–32)
CREAT BLD-MCNC: 0.85 MG/DL (ref 0.55–1.02)
CREAT UR-SCNC: 45.9 MG/DL
DEPRECATED HBV CORE AB SER IA-ACNC: 28 NG/ML (ref 50–306)
DEPRECATED RDW RBC AUTO: 39.7 FL (ref 35.1–46.3)
EGFRCR SERPLBLD CKD-EPI 2021: 92 ML/MIN/1.73M2 (ref 60–?)
EOSINOPHIL # BLD AUTO: 0.15 X10(3) UL (ref 0–0.7)
EOSINOPHIL NFR BLD AUTO: 2.7 %
ERYTHROCYTE [DISTWIDTH] IN BLOOD BY AUTOMATED COUNT: 12.4 % (ref 11–15)
EST. AVERAGE GLUCOSE BLD GHB EST-MCNC: 105 MG/DL (ref 68–126)
FASTING PATIENT LIPID ANSWER: YES
FASTING STATUS PATIENT QL REPORTED: YES
GLOBULIN PLAS-MCNC: 3.5 G/DL (ref 2–3.5)
GLUCOSE BLD-MCNC: 89 MG/DL (ref 70–99)
HBA1C MFR BLD: 5.3 % (ref ?–5.7)
HCT VFR BLD AUTO: 37.5 % (ref 35–48)
HDLC SERPL-MCNC: 62 MG/DL (ref 40–59)
HGB BLD-MCNC: 12.3 G/DL (ref 12–16)
IMM GRANULOCYTES # BLD AUTO: 0.02 X10(3) UL (ref 0–1)
IMM GRANULOCYTES NFR BLD: 0.4 %
IRON SATN MFR SERPL: 24 % (ref 15–50)
IRON SERPL-MCNC: 79 UG/DL (ref 50–170)
LDLC SERPL CALC-MCNC: 118 MG/DL (ref ?–100)
LYMPHOCYTES # BLD AUTO: 1.77 X10(3) UL (ref 1–4)
LYMPHOCYTES NFR BLD AUTO: 32.4 %
MCH RBC QN AUTO: 28.4 PG (ref 26–34)
MCHC RBC AUTO-ENTMCNC: 32.8 G/DL (ref 31–37)
MCV RBC AUTO: 86.6 FL (ref 80–100)
MICROALBUMIN UR-MCNC: <0.3 MG/DL
MONOCYTES # BLD AUTO: 0.55 X10(3) UL (ref 0.1–1)
MONOCYTES NFR BLD AUTO: 10.1 %
NEUTROPHILS # BLD AUTO: 2.93 X10 (3) UL (ref 1.5–7.7)
NEUTROPHILS # BLD AUTO: 2.93 X10(3) UL (ref 1.5–7.7)
NEUTROPHILS NFR BLD AUTO: 53.7 %
NONHDLC SERPL-MCNC: 130 MG/DL (ref ?–130)
OSMOLALITY SERPL CALC.SUM OF ELEC: 283 MOSM/KG (ref 275–295)
PLATELET # BLD AUTO: 259 10(3)UL (ref 150–450)
POTASSIUM SERPL-SCNC: 3.8 MMOL/L (ref 3.5–5.1)
PROT SERPL-MCNC: 8.1 G/DL (ref 5.7–8.2)
RBC # BLD AUTO: 4.33 X10(6)UL (ref 3.8–5.3)
SODIUM SERPL-SCNC: 137 MMOL/L (ref 136–145)
TOTAL IRON BINDING CAPACITY: 331 UG/DL (ref 250–425)
TRANSFERRIN SERPL-MCNC: 253 MG/DL (ref 250–380)
TRIGL SERPL-MCNC: 65 MG/DL (ref 30–149)
VLDLC SERPL CALC-MCNC: 11 MG/DL (ref 0–30)
WBC # BLD AUTO: 5.5 X10(3) UL (ref 4–11)

## 2025-08-23 PROCEDURE — 36415 COLL VENOUS BLD VENIPUNCTURE: CPT

## 2025-08-23 PROCEDURE — 80061 LIPID PANEL: CPT

## 2025-08-23 PROCEDURE — 85025 COMPLETE CBC W/AUTO DIFF WBC: CPT

## 2025-08-23 PROCEDURE — 82728 ASSAY OF FERRITIN: CPT

## 2025-08-23 PROCEDURE — 80053 COMPREHEN METABOLIC PANEL: CPT

## 2025-08-23 PROCEDURE — 82570 ASSAY OF URINE CREATININE: CPT

## 2025-08-23 PROCEDURE — 82043 UR ALBUMIN QUANTITATIVE: CPT

## 2025-08-23 PROCEDURE — 84466 ASSAY OF TRANSFERRIN: CPT

## 2025-08-23 PROCEDURE — 83036 HEMOGLOBIN GLYCOSYLATED A1C: CPT

## 2025-08-23 PROCEDURE — 83540 ASSAY OF IRON: CPT

## (undated) NOTE — MR AVS SNAPSHOT
[address] at [facility]  [address], [address]               Thank you for choosing us for your health care visit with Jennifer Benitez DO.   We are glad to serve you and happy to provide trained healthcare provider. There are different types of IUDs available. They work by causing changes in the uterus that make it harder for sperm to reach the egg. Depending on the type of IUD you have, it may work for several years or longer.  The IUD is Withdrawal Method  This is when the man pulls his penis out of the vagina just before ejaculation (“coming”). This lowers the amount of sperm entering the vagina.  Be aware that fluids released just before ejaculation often still contain some sperm, so this Follow-up Instructions     Return if symptoms worsen or fail to improve. [email or web address]     Sign up for [email or web address], your secure online medical record.   [email or web address] will allow you to access patient instructions from your recent visit,  view other health informati

## (undated) NOTE — LETTER
2Date: 2/15/2019    Patient Name: Mary Rowell          To Whom it may concern: This letter has been written at the patient's request. The above patient was seen at the [facility] for treatment of a medical condition.     This patient sh

## (undated) NOTE — LETTER
Date & Time: 12/6/2022, 11:19 AM  Patient: Concepción Fuentes  Encounter Provider(s):    INGRID Ferrer       To Whom It May Concern:    Hanny Edward was seen and treated in our department on 12/6/2022. She can return to work 12/8/2022. If you have any questions or concerns, please do not hesitate to call.        _____________________________  Jamal Curiel.  Maurice Loza

## (undated) NOTE — LETTER
May 1, 2019    Terrie Holbrook, [address] [facility]  [id number]     Patient: Fredis Ordoñez   YOB: 1990   Date of Visit: 5/1/2019       Dear Dr. Brandon Montilla, DO:    Thank you for referring Taye Lombardo to me for evaluation.  Her Current Outpatient Medications:  hydrochlorothiazide 25 MG Oral Tab Take 1 tablet (25 mg total) by mouth daily. Disp: 90 tablet Rfl: 0   hydrocortisone 1 % External Ointment Apply 1 Application topically 2 (two) times daily.  X 1 week only Disp: 1 Tube Rfl: (any over the counter brand is okay) up to 4 times per day as needed for dry eye symptoms. No orders of the defined types were placed in this encounter.       Meds This Visit:  Requested Prescriptions      No prescriptions requested or ordered in thi

## (undated) NOTE — LETTER
VACCINE ADMINISTRATION RECORD  PARENT / GUARDIAN APPROVAL  Date: 2021  Vaccine administered to: Flakito Billings     : 1990    MRN: [id number]    A copy of the appropriate Centers for Disease Control and Prevention Vaccine Information

## (undated) NOTE — LETTER
Date & Time: 4/8/2019, 12:45 PM  Patient: Derick Adjutant      To Whom It May Concern:    Jaun Veloz was seen and treated in our department on 4/8/2019. She can return to work tomorrow. The redness in the eye is not contagious. .    If you have any qu

## (undated) NOTE — LETTER
[facility]  Administration of Anesthesia  1.  Jessica Arellano, or _________________________________ acting on her behalf, (Patient) (Dependent/Representative) request to receive anesthesia for my pending procedure/operation/ block, spinal bleeding, seizure, cardiac arrest and death. 7. AWARENESS: I understand that it is possible (but unlikely) to have explicit memory of events from the operating room while under general anesthesia.   8. ELECTROCONVULSIVE THERAPY PATIENTS: This My signature below affirms that prior to the time of the procedure, I have explained to the patient and/or his/her guardian, the risks and benefits of undergoing anesthesia, as well as any reasonable alternatives.     _______________________________________

## (undated) NOTE — LETTER
Date & Time: 5/15/2019, 12:52 PM  Patient: Jeffry Lee  Encounter Provider(s):    Nida Herrera DO       To Whom It May Concern:    Mary Beth Mejia was seen and treated in our department on 5/15/2019. She should not return to work until 5/16/19.

## (undated) NOTE — LETTER
November 4, 2017    Regarding:  Denisha Millan        Dear Aliyah Atwood: Your TB test today was negative. Date given: 11/1/17  Date read: November 4, 2017    Also, did you know that you can receive test result information and reminders securely online?  Abdirashid Key

## (undated) NOTE — LETTER
Date & Time: 10/17/2024, 2:36 PM  Patient: Kang Szymanski  Encounter Provider(s):    Cleo Garrett MD       To Whom It May Concern:    Kang Szymanski was seen and treated in our department on 10/17/2024. She can return to work 10/21/2024.    If you have any questions or concerns, please do not hesitate to call.        _____________________________  RN Signature

## (undated) NOTE — LETTER
[address]  [address] [address]  Dept: [phone]      January 29, 2018        Date of Visit: 1/29/2018       To Whom It May Concern:    Mr. Jesus Cardenas accompanied a family member who was seen and farzad

## (undated) NOTE — LETTER
Date & Time: 5/15/2019, 12:52 PM  Patient: Ye Sanchez  Encounter Provider(s):    Poncho Benoit DO       To Whom It May Concern:    Devika Conley was seen and treated in our department on 5/15/2019. She should not return to work until 5/17/19.

## (undated) NOTE — LETTER
Date & Time: 1/12/2025, 12:57 PM  Patient: Kang Szymanski  Encounter Provider(s):    Rosana Espitia APRN       To Whom It May Concern:    Kang Szymanski was seen and treated in our department on 1/12/2025. She can return to work Tuesday 1/14/2025.    If you have any questions or concerns, please do not hesitate to call.      CHANDRIKA LealP-BC  _____________________________  Physician/APC Signature

## (undated) NOTE — LETTER
[address]  [address] [address]  Dept: [phone]      January 29, 2018    Patient: Kay Hunt   Date of Visit: 1/29/2018       To Whom It May Concern:    Flavia Greco was seen and treated in our Imm